# Patient Record
Sex: MALE | Race: BLACK OR AFRICAN AMERICAN | NOT HISPANIC OR LATINO | URBAN - METROPOLITAN AREA
[De-identification: names, ages, dates, MRNs, and addresses within clinical notes are randomized per-mention and may not be internally consistent; named-entity substitution may affect disease eponyms.]

---

## 2017-03-31 ENCOUNTER — EMERGENCY (EMERGENCY)
Facility: HOSPITAL | Age: 22
LOS: 1 days | Discharge: HOME | End: 2017-03-31
Attending: EMERGENCY MEDICINE
Payer: SELF-PAY

## 2017-03-31 VITALS
DIASTOLIC BLOOD PRESSURE: 74 MMHG | OXYGEN SATURATION: 98 % | TEMPERATURE: 98 F | SYSTOLIC BLOOD PRESSURE: 126 MMHG | HEART RATE: 87 BPM | RESPIRATION RATE: 17 BRPM

## 2017-03-31 DIAGNOSIS — R68.83 CHILLS (WITHOUT FEVER): ICD-10-CM

## 2017-03-31 DIAGNOSIS — M25.50 PAIN IN UNSPECIFIED JOINT: ICD-10-CM

## 2017-03-31 PROCEDURE — 99284 EMERGENCY DEPT VISIT MOD MDM: CPT | Mod: 25

## 2017-03-31 PROCEDURE — 99053 MED SERV 10PM-8AM 24 HR FAC: CPT

## 2017-03-31 NOTE — ED ADULT TRIAGE NOTE - CHIEF COMPLAINT QUOTE
pt presents to ED c/o joint pains and feeling cold.  pt is undomiciled.  denies PMH, any trauma, drug/alcohol use.

## 2017-04-01 VITALS
DIASTOLIC BLOOD PRESSURE: 72 MMHG | HEART RATE: 65 BPM | RESPIRATION RATE: 18 BRPM | SYSTOLIC BLOOD PRESSURE: 127 MMHG | TEMPERATURE: 96 F | OXYGEN SATURATION: 98 %

## 2017-04-01 LAB
ALBUMIN SERPL ELPH-MCNC: 3.7 G/DL — SIGNIFICANT CHANGE UP (ref 3.3–5.2)
ALP SERPL-CCNC: 64 U/L — SIGNIFICANT CHANGE UP (ref 40–120)
ALT FLD-CCNC: 18 U/L — SIGNIFICANT CHANGE UP
ANION GAP SERPL CALC-SCNC: 11 MMOL/L — SIGNIFICANT CHANGE UP (ref 5–17)
APAP SERPL-MCNC: <7.5 UG/ML — LOW (ref 10–26)
APPEARANCE UR: CLEAR — SIGNIFICANT CHANGE UP
AST SERPL-CCNC: 17 U/L — SIGNIFICANT CHANGE UP
BASOPHILS # BLD AUTO: 0 K/UL — SIGNIFICANT CHANGE UP (ref 0–0.2)
BASOPHILS NFR BLD AUTO: 0.2 % — SIGNIFICANT CHANGE UP (ref 0–2)
BILIRUB SERPL-MCNC: 0.3 MG/DL — LOW (ref 0.4–2)
BILIRUB UR-MCNC: NEGATIVE — SIGNIFICANT CHANGE UP
BUN SERPL-MCNC: 13 MG/DL — SIGNIFICANT CHANGE UP (ref 8–20)
CALCIUM SERPL-MCNC: 8.6 MG/DL — SIGNIFICANT CHANGE UP (ref 8.6–10.2)
CHLORIDE SERPL-SCNC: 106 MMOL/L — SIGNIFICANT CHANGE UP (ref 98–107)
CO2 SERPL-SCNC: 28 MMOL/L — SIGNIFICANT CHANGE UP (ref 22–29)
COLOR SPEC: YELLOW — SIGNIFICANT CHANGE UP
CREAT SERPL-MCNC: 0.64 MG/DL — SIGNIFICANT CHANGE UP (ref 0.5–1.3)
DIFF PNL FLD: NEGATIVE — SIGNIFICANT CHANGE UP
EOSINOPHIL # BLD AUTO: 0.3 K/UL — SIGNIFICANT CHANGE UP (ref 0–0.5)
EOSINOPHIL NFR BLD AUTO: 5.1 % — HIGH (ref 0–5)
ETHANOL SERPL-MCNC: <10 MG/DL — SIGNIFICANT CHANGE UP
GLUCOSE SERPL-MCNC: 113 MG/DL — SIGNIFICANT CHANGE UP (ref 70–115)
GLUCOSE UR QL: NEGATIVE MG/DL — SIGNIFICANT CHANGE UP
HCT VFR BLD CALC: 41.9 % — LOW (ref 42–52)
HGB BLD-MCNC: 14 G/DL — SIGNIFICANT CHANGE UP (ref 14–18)
KETONES UR-MCNC: NEGATIVE — SIGNIFICANT CHANGE UP
LEUKOCYTE ESTERASE UR-ACNC: NEGATIVE — SIGNIFICANT CHANGE UP
LYMPHOCYTES # BLD AUTO: 3.1 K/UL — SIGNIFICANT CHANGE UP (ref 1–4.8)
LYMPHOCYTES # BLD AUTO: 49.9 % — SIGNIFICANT CHANGE UP (ref 20–55)
MAGNESIUM SERPL-MCNC: 1.9 MG/DL — SIGNIFICANT CHANGE UP (ref 1.8–2.5)
MCHC RBC-ENTMCNC: 27.9 PG — SIGNIFICANT CHANGE UP (ref 27–31)
MCHC RBC-ENTMCNC: 33.4 G/DL — SIGNIFICANT CHANGE UP (ref 32–36)
MCV RBC AUTO: 83.6 FL — SIGNIFICANT CHANGE UP (ref 80–94)
MONOCYTES # BLD AUTO: 0.4 K/UL — SIGNIFICANT CHANGE UP (ref 0–0.8)
MONOCYTES NFR BLD AUTO: 6.9 % — SIGNIFICANT CHANGE UP (ref 3–10)
NEUTROPHILS # BLD AUTO: 2.3 K/UL — SIGNIFICANT CHANGE UP (ref 1.8–8)
NEUTROPHILS NFR BLD AUTO: 37.7 % — SIGNIFICANT CHANGE UP (ref 37–73)
NITRITE UR-MCNC: NEGATIVE — SIGNIFICANT CHANGE UP
PH UR: 6 — SIGNIFICANT CHANGE UP (ref 4.8–8)
PLATELET # BLD AUTO: 237 K/UL — SIGNIFICANT CHANGE UP (ref 150–400)
POTASSIUM SERPL-MCNC: 3.6 MMOL/L — SIGNIFICANT CHANGE UP (ref 3.5–5.3)
POTASSIUM SERPL-SCNC: 3.6 MMOL/L — SIGNIFICANT CHANGE UP (ref 3.5–5.3)
PROT SERPL-MCNC: 5.8 G/DL — LOW (ref 6.6–8.7)
PROT UR-MCNC: NEGATIVE MG/DL — SIGNIFICANT CHANGE UP
RBC # BLD: 5.01 M/UL — SIGNIFICANT CHANGE UP (ref 4.6–6.2)
RBC # FLD: 12.9 % — SIGNIFICANT CHANGE UP (ref 11–15.6)
SALICYLATES SERPL-MCNC: <2 MG/DL — LOW (ref 10–20)
SODIUM SERPL-SCNC: 145 MMOL/L — SIGNIFICANT CHANGE UP (ref 135–145)
SP GR SPEC: 1.02 — SIGNIFICANT CHANGE UP (ref 1.01–1.02)
UROBILINOGEN FLD QL: NEGATIVE MG/DL — SIGNIFICANT CHANGE UP
WBC # BLD: 6.1 K/UL — SIGNIFICANT CHANGE UP (ref 4.8–10.8)
WBC # FLD AUTO: 6.1 K/UL — SIGNIFICANT CHANGE UP (ref 4.8–10.8)

## 2017-04-01 PROCEDURE — 87086 URINE CULTURE/COLONY COUNT: CPT

## 2017-04-01 PROCEDURE — 81003 URINALYSIS AUTO W/O SCOPE: CPT

## 2017-04-01 PROCEDURE — 80307 DRUG TEST PRSMV CHEM ANLYZR: CPT

## 2017-04-01 PROCEDURE — 80053 COMPREHEN METABOLIC PANEL: CPT

## 2017-04-01 PROCEDURE — 85027 COMPLETE CBC AUTOMATED: CPT

## 2017-04-01 PROCEDURE — 83735 ASSAY OF MAGNESIUM: CPT

## 2017-04-01 PROCEDURE — 99283 EMERGENCY DEPT VISIT LOW MDM: CPT | Mod: 25

## 2017-04-01 NOTE — ED PROVIDER NOTE - NS ED MD SCRIBE ATTENDING SCRIBE SECTIONS
VITAL SIGNS( Pullset)/PHYSICAL EXAM/REVIEW OF SYSTEMS/DISPOSITION/HISTORY OF PRESENT ILLNESS/PAST MEDICAL/SURGICAL/SOCIAL HISTORY/HIV

## 2017-04-01 NOTE — ED ADULT NURSE NOTE - OBJECTIVE STATEMENT
Assumed pt care @ 100. At first communication with pt, he continue to stare and me without responding. I then proceeded to ask pt his name and  and he was able to tell me and is A&OX3 Pt states he is here because of a HA and nausea. Denies any drugs/alcohol/trauma. Denies SI/HI.

## 2017-04-01 NOTE — ED PROVIDER NOTE - OBJECTIVE STATEMENT
22 y/o M pt presents to the ED c/o 20 y/o M pt presents to the ED c/o joint pain and chills. Pt is homeless. Unable to obtain complete hx due to mental status of pt. Pt denies CP, SOB, and any other physical complaints. No further complaints at this time. NKDA.

## 2017-04-01 NOTE — ED ADULT NURSE REASSESSMENT NOTE - NS ED NURSE REASSESS COMMENT FT1
Pt resting comfortably, VSS, resp even and unlabored no signs of nonverbal indicators of pain. Will continue to monitor.

## 2017-04-02 LAB
CULTURE RESULTS: SIGNIFICANT CHANGE UP
SPECIMEN SOURCE: SIGNIFICANT CHANGE UP

## 2018-01-16 ENCOUNTER — EMERGENCY (EMERGENCY)
Facility: HOSPITAL | Age: 23
LOS: 1 days | Discharge: OTHER INSTITUTION | End: 2018-01-16
Attending: EMERGENCY MEDICINE | Admitting: EMERGENCY MEDICINE
Payer: SELF-PAY

## 2018-01-16 VITALS
DIASTOLIC BLOOD PRESSURE: 75 MMHG | SYSTOLIC BLOOD PRESSURE: 120 MMHG | WEIGHT: 175.05 LBS | TEMPERATURE: 98 F | RESPIRATION RATE: 18 BRPM | OXYGEN SATURATION: 97 % | HEART RATE: 70 BPM

## 2018-01-16 LAB
AMPHET UR-MCNC: NEGATIVE — SIGNIFICANT CHANGE UP
BARBITURATES UR SCN-MCNC: NEGATIVE — SIGNIFICANT CHANGE UP
BENZODIAZ UR-MCNC: NEGATIVE — SIGNIFICANT CHANGE UP
COCAINE METAB.OTHER UR-MCNC: NEGATIVE — SIGNIFICANT CHANGE UP
METHADONE UR-MCNC: NEGATIVE — SIGNIFICANT CHANGE UP
OPIATES UR-MCNC: NEGATIVE — SIGNIFICANT CHANGE UP
PCP SPEC-MCNC: SIGNIFICANT CHANGE UP
PCP UR-MCNC: NEGATIVE — SIGNIFICANT CHANGE UP
THC UR QL: POSITIVE

## 2018-01-16 PROCEDURE — 99285 EMERGENCY DEPT VISIT HI MDM: CPT

## 2018-01-16 PROCEDURE — 93010 ELECTROCARDIOGRAM REPORT: CPT

## 2018-01-16 PROCEDURE — 90792 PSYCH DIAG EVAL W/MED SRVCS: CPT | Mod: GT

## 2018-01-16 NOTE — ED ADULT NURSE NOTE - CAS DISCH TRANSFER METHOD
Ambulance with BLS capability [facility] transport/Ambulance with BLS capability

## 2018-01-16 NOTE — ED PROVIDER NOTE - PROGRESS NOTE DETAILS
As per sign-out from Dr. Roblero patient awaiting psychiatric consultation for episode of hallucinations ( reported) Spoke with Dr. Haji whom recommends admission, voluntarily.

## 2018-01-16 NOTE — ED ADULT NURSE NOTE - OBJECTIVE STATEMENT
Assumed pt care at 2100.  Pt awake, alert and oriented x3 brought in for psych eval by aunt for possible hallucinations and pacing around home.   Pt responding appropriately at this time.

## 2018-01-16 NOTE — ED PROVIDER NOTE - CARE PLAN
Principal Discharge DX:	PTSD (post-traumatic stress disorder) Principal Discharge DX:	PTSD (post-traumatic stress disorder)  Secondary Diagnosis:	Psychosis

## 2018-01-16 NOTE — ED PROVIDER NOTE - OBJECTIVE STATEMENT
21 y/o male with h/o PTSD and previous admission to [facility] BIB aunt who is currently his guardian for increased "pacing around the house and possible hallucinations".  Pt's aunt states he seems to be talking and responding to people that are not there.  Pt denies any SI or HI.  Pt stopped taking Seroquel several months ago and isn't currently following up with psychiatry.  Pt denies any drug or alcohol use but smokes about 5 cigarettes daily.

## 2018-01-17 ENCOUNTER — INPATIENT (INPATIENT)
Facility: HOSPITAL | Age: 23
LOS: 39 days | Discharge: HOME | End: 2018-02-26
Attending: PSYCHIATRY & NEUROLOGY | Admitting: PSYCHIATRY & NEUROLOGY
Payer: MEDICAID

## 2018-01-17 VITALS
OXYGEN SATURATION: 100 % | RESPIRATION RATE: 20 BRPM | HEART RATE: 69 BPM | TEMPERATURE: 98 F | DIASTOLIC BLOOD PRESSURE: 67 MMHG | SYSTOLIC BLOOD PRESSURE: 132 MMHG

## 2018-01-17 DIAGNOSIS — Z91.19 PATIENT'S NONCOMPLIANCE WITH OTHER MEDICAL TREATMENT AND REGIMEN: ICD-10-CM

## 2018-01-17 DIAGNOSIS — F20.5 RESIDUAL SCHIZOPHRENIA: ICD-10-CM

## 2018-01-17 DIAGNOSIS — F43.10 POST-TRAUMATIC STRESS DISORDER, UNSPECIFIED: ICD-10-CM

## 2018-01-17 DIAGNOSIS — F10.10 ALCOHOL ABUSE, UNCOMPLICATED: ICD-10-CM

## 2018-01-17 DIAGNOSIS — F12.20 CANNABIS DEPENDENCE, UNCOMPLICATED: ICD-10-CM

## 2018-01-17 DIAGNOSIS — F10.20 ALCOHOL DEPENDENCE, UNCOMPLICATED: ICD-10-CM

## 2018-01-17 DIAGNOSIS — Z63.9 PROBLEM RELATED TO PRIMARY SUPPORT GROUP, UNSPECIFIED: ICD-10-CM

## 2018-01-17 DIAGNOSIS — F23 BRIEF PSYCHOTIC DISORDER: ICD-10-CM

## 2018-01-17 DIAGNOSIS — R69 ILLNESS, UNSPECIFIED: ICD-10-CM

## 2018-01-17 DIAGNOSIS — M25.572 PAIN IN LEFT ANKLE AND JOINTS OF LEFT FOOT: ICD-10-CM

## 2018-01-17 DIAGNOSIS — Z91.14 PATIENT'S OTHER NONCOMPLIANCE WITH MEDICATION REGIMEN: ICD-10-CM

## 2018-01-17 DIAGNOSIS — G47.00 INSOMNIA, UNSPECIFIED: ICD-10-CM

## 2018-01-17 DIAGNOSIS — Z28.21 IMMUNIZATION NOT CARRIED OUT BECAUSE OF PATIENT REFUSAL: ICD-10-CM

## 2018-01-17 DIAGNOSIS — F20.3 UNDIFFERENTIATED SCHIZOPHRENIA: ICD-10-CM

## 2018-01-17 DIAGNOSIS — R14.3 FLATULENCE: ICD-10-CM

## 2018-01-17 DIAGNOSIS — F17.200 NICOTINE DEPENDENCE, UNSPECIFIED, UNCOMPLICATED: ICD-10-CM

## 2018-01-17 LAB
ANION GAP SERPL CALC-SCNC: 13 MMOL/L — SIGNIFICANT CHANGE UP (ref 5–17)
APAP SERPL-MCNC: <7.5 UG/ML — LOW (ref 10–26)
APPEARANCE UR: CLEAR — SIGNIFICANT CHANGE UP
BASOPHILS # BLD AUTO: 0 K/UL — SIGNIFICANT CHANGE UP (ref 0–0.2)
BASOPHILS NFR BLD AUTO: 0.2 % — SIGNIFICANT CHANGE UP (ref 0–2)
BILIRUB UR-MCNC: NEGATIVE — SIGNIFICANT CHANGE UP
BUN SERPL-MCNC: 16 MG/DL — SIGNIFICANT CHANGE UP (ref 8–20)
CALCIUM SERPL-MCNC: 9.3 MG/DL — SIGNIFICANT CHANGE UP (ref 8.6–10.2)
CHLORIDE SERPL-SCNC: 100 MMOL/L — SIGNIFICANT CHANGE UP (ref 98–107)
CO2 SERPL-SCNC: 26 MMOL/L — SIGNIFICANT CHANGE UP (ref 22–29)
COLOR SPEC: YELLOW — SIGNIFICANT CHANGE UP
CREAT SERPL-MCNC: 0.76 MG/DL — SIGNIFICANT CHANGE UP (ref 0.5–1.3)
DIFF PNL FLD: NEGATIVE — SIGNIFICANT CHANGE UP
EOSINOPHIL # BLD AUTO: 0.3 K/UL — SIGNIFICANT CHANGE UP (ref 0–0.5)
EOSINOPHIL NFR BLD AUTO: 4.7 % — SIGNIFICANT CHANGE UP (ref 0–5)
ETHANOL SERPL-MCNC: <10 MG/DL — SIGNIFICANT CHANGE UP
GLUCOSE SERPL-MCNC: 94 MG/DL — SIGNIFICANT CHANGE UP (ref 70–115)
GLUCOSE UR QL: NEGATIVE MG/DL — SIGNIFICANT CHANGE UP
HCT VFR BLD CALC: 44.8 % — SIGNIFICANT CHANGE UP (ref 42–52)
HGB BLD-MCNC: 15 G/DL — SIGNIFICANT CHANGE UP (ref 14–18)
KETONES UR-MCNC: NEGATIVE — SIGNIFICANT CHANGE UP
LEUKOCYTE ESTERASE UR-ACNC: NEGATIVE — SIGNIFICANT CHANGE UP
LYMPHOCYTES # BLD AUTO: 3.6 K/UL — SIGNIFICANT CHANGE UP (ref 1–4.8)
LYMPHOCYTES # BLD AUTO: 55 % — SIGNIFICANT CHANGE UP (ref 20–55)
MCHC RBC-ENTMCNC: 28.8 PG — SIGNIFICANT CHANGE UP (ref 27–31)
MCHC RBC-ENTMCNC: 33.5 G/DL — SIGNIFICANT CHANGE UP (ref 32–36)
MCV RBC AUTO: 86 FL — SIGNIFICANT CHANGE UP (ref 80–94)
MONOCYTES # BLD AUTO: 0.3 K/UL — SIGNIFICANT CHANGE UP (ref 0–0.8)
MONOCYTES NFR BLD AUTO: 5 % — SIGNIFICANT CHANGE UP (ref 3–10)
NEUTROPHILS # BLD AUTO: 2.3 K/UL — SIGNIFICANT CHANGE UP (ref 1.8–8)
NEUTROPHILS NFR BLD AUTO: 34.9 % — LOW (ref 37–73)
NITRITE UR-MCNC: NEGATIVE — SIGNIFICANT CHANGE UP
PH UR: 6 — SIGNIFICANT CHANGE UP (ref 5–8)
PLATELET # BLD AUTO: 213 K/UL — SIGNIFICANT CHANGE UP (ref 150–400)
POTASSIUM SERPL-MCNC: 4 MMOL/L — SIGNIFICANT CHANGE UP (ref 3.5–5.3)
POTASSIUM SERPL-SCNC: 4 MMOL/L — SIGNIFICANT CHANGE UP (ref 3.5–5.3)
PROT UR-MCNC: NEGATIVE MG/DL — SIGNIFICANT CHANGE UP
RBC # BLD: 5.21 M/UL — SIGNIFICANT CHANGE UP (ref 4.6–6.2)
RBC # FLD: 13.3 % — SIGNIFICANT CHANGE UP (ref 11–15.6)
SALICYLATES SERPL-MCNC: <0.6 MG/DL — LOW (ref 10–20)
SODIUM SERPL-SCNC: 139 MMOL/L — SIGNIFICANT CHANGE UP (ref 135–145)
SP GR SPEC: 1.01 — SIGNIFICANT CHANGE UP (ref 1.01–1.02)
TSH SERPL-MCNC: 2.52 UIU/ML — SIGNIFICANT CHANGE UP (ref 0.27–4.2)
UROBILINOGEN FLD QL: NEGATIVE MG/DL — SIGNIFICANT CHANGE UP
WBC # BLD: 6.6 K/UL — SIGNIFICANT CHANGE UP (ref 4.8–10.8)
WBC # FLD AUTO: 6.6 K/UL — SIGNIFICANT CHANGE UP (ref 4.8–10.8)

## 2018-01-17 PROCEDURE — 85027 COMPLETE CBC AUTOMATED: CPT

## 2018-01-17 PROCEDURE — 81003 URINALYSIS AUTO W/O SCOPE: CPT

## 2018-01-17 PROCEDURE — 80048 BASIC METABOLIC PNL TOTAL CA: CPT

## 2018-01-17 PROCEDURE — 93005 ELECTROCARDIOGRAM TRACING: CPT

## 2018-01-17 PROCEDURE — 84443 ASSAY THYROID STIM HORMONE: CPT

## 2018-01-17 PROCEDURE — 80307 DRUG TEST PRSMV CHEM ANLYZR: CPT

## 2018-01-17 PROCEDURE — 36415 COLL VENOUS BLD VENIPUNCTURE: CPT

## 2018-01-17 PROCEDURE — 99285 EMERGENCY DEPT VISIT HI MDM: CPT | Mod: 25

## 2018-01-17 RX ORDER — INFLUENZA VIRUS VACCINE 15; 15; 15; 15 UG/.5ML; UG/.5ML; UG/.5ML; UG/.5ML
0.5 SUSPENSION INTRAMUSCULAR ONCE
Qty: 0 | Refills: 0 | Status: COMPLETED | OUTPATIENT
Start: 2018-01-17 | End: 2018-01-17

## 2018-01-17 RX ORDER — SIMETHICONE 80 MG/1
80 TABLET, CHEWABLE ORAL
Qty: 0 | Refills: 0 | Status: DISCONTINUED | OUTPATIENT
Start: 2018-01-17 | End: 2018-02-26

## 2018-01-17 RX ORDER — HALOPERIDOL DECANOATE 100 MG/ML
5 INJECTION INTRAMUSCULAR EVERY 6 HOURS
Qty: 0 | Refills: 0 | Status: DISCONTINUED | OUTPATIENT
Start: 2018-01-17 | End: 2018-02-26

## 2018-01-17 RX ORDER — DOCUSATE SODIUM 100 MG
100 CAPSULE ORAL DAILY
Qty: 0 | Refills: 0 | Status: DISCONTINUED | OUTPATIENT
Start: 2018-01-17 | End: 2018-01-29

## 2018-01-17 RX ORDER — ACETAMINOPHEN 500 MG
650 TABLET ORAL EVERY 6 HOURS
Qty: 0 | Refills: 0 | Status: DISCONTINUED | OUTPATIENT
Start: 2018-01-17 | End: 2018-02-26

## 2018-01-17 RX ORDER — RISPERIDONE 4 MG/1
0.5 TABLET ORAL AT BEDTIME
Qty: 0 | Refills: 0 | Status: DISCONTINUED | OUTPATIENT
Start: 2018-01-17 | End: 2018-01-19

## 2018-01-17 RX ORDER — DIPHENHYDRAMINE HCL 50 MG
50 CAPSULE ORAL EVERY 4 HOURS
Qty: 0 | Refills: 0 | Status: DISCONTINUED | OUTPATIENT
Start: 2018-01-17 | End: 2018-02-01

## 2018-01-17 RX ORDER — IBUPROFEN 200 MG
600 TABLET ORAL EVERY 6 HOURS
Qty: 0 | Refills: 0 | Status: DISCONTINUED | OUTPATIENT
Start: 2018-01-17 | End: 2018-02-26

## 2018-01-17 RX ORDER — MAGNESIUM HYDROXIDE 400 MG/1
30 TABLET, CHEWABLE ORAL DAILY
Qty: 0 | Refills: 0 | Status: DISCONTINUED | OUTPATIENT
Start: 2018-01-17 | End: 2018-01-29

## 2018-01-17 RX ADMIN — RISPERIDONE 0.5 MILLIGRAM(S): 4 TABLET ORAL at 21:18

## 2018-01-17 SDOH — SOCIAL STABILITY - SOCIAL INSECURITY: PROBLEM RELATED TO PRIMARY SUPPORT GROUP, UNSPECIFIED: Z63.9

## 2018-01-17 NOTE — BEHAVIORAL HEALTH ASSESSMENT NOTE - NSBHADMITMEDEDUDETAILS_A_CORE FT
Informed consent process begun related to restart of antipsychotic medication Risperdal 9 effective and well tolerated in the past

## 2018-01-17 NOTE — ED BEHAVIORAL HEALTH ASSESSMENT NOTE - OTHER
[facility] - danger to self n/a

## 2018-01-17 NOTE — ED BEHAVIORAL HEALTH ASSESSMENT NOTE - DESCRIPTION
unremarkable none Patient graduated HS, has worked a few jobs since then. He lives with Aunt because he is very behavioral with mom.  Patient. witnessed Grandmother's death  at age 15.

## 2018-01-17 NOTE — ED ADULT NURSE REASSESSMENT NOTE - NS ED NURSE REASSESS COMMENT FT1
report given to Sae RICHTER in 
Received pt AOx3, calm and in yellow gown. Pt cooperative during security contraband check. Pt reports that he has been pacing back and forth lately. Pt reports increased energy and racing thoughts. Pt denies any SI/HI or any hallucinations. Tele-psych consult complete. Pt aware of plan of care to sign voluntary. Will monitor the pt for safety.

## 2018-01-17 NOTE — BEHAVIORAL HEALTH ASSESSMENT NOTE - NSBHCHARTREVIEWLAB_PSY_A_CORE FT
CBC Full  -  ( 2018 02:12 )  WBC Count : 6.6 K/uL  Hemoglobin : 15.0 g/dL  Hematocrit : 44.8 %  Platelet Count - Automated : 213 K/uL  Mean Cell Volume : 86.0 fl  Mean Cell Hemoglobin : 28.8 pg  Mean Cell Hemoglobin Concentration : 33.5 g/dL  Auto Neutrophil # : 2.3 K/uL  Auto Lymphocyte # : 3.6 K/uL  Auto Monocyte # : 0.3 K/uL  Auto Eosinophil # : 0.3 K/uL  Auto Basophil # : 0.0 K/uL  Auto Neutrophil % : 34.9 %  Auto Lymphocyte % : 55.0 %  Auto Monocyte % : 5.0 %  Auto Eosinophil % : 4.7 %  Auto Basophil % : 0.2 %    -17    139  |  100  |  16.0  ----------------------------<  94  4.0   |  26.0  |  0.76    Ca    9.3      2018 02:12        Urinalysis Basic - ( 2018 04:26 )    Color: Yellow / Appearance: Clear / S.015 / pH: x  Gluc: x / Ketone: Negative  / Bili: Negative / Urobili: Negative mg/dL   Blood: x / Protein: Negative mg/dL / Nitrite: Negative   Leuk Esterase: Negative / RBC: x / WBC x   Sq Epi: x / Non Sq Epi: x / Bacteria: x

## 2018-01-17 NOTE — ED BEHAVIORAL HEALTH ASSESSMENT NOTE - SUMMARY
Patient is a 21 y/o AA male with h/o Schizophrenia, marijuana abuse, post-traumatic stress disorder, with two past psych hospitalizations, bib Aunt (legal guardian) with Aunt complaining of pt. hearing voices and being internally preoccupied.  Patient. with 1 arrest, no violence, no suicide attempts, no self injury.    Patient reports that he is in ER because he has been pacing lately, not sleeping well.  He has extra energy and he has been taking very long distance walks.  He endorses not being on psych meds for a few years.  He use to be on antipsychotics a few years ago.  He remembers being on Seroquel and Risperdal.  But he remembers Seroquel gave him nightmares and made him very tired.  He most recently got fired from his job that he has had since this past summer.  He did not know why he was fired.  Patient. was vague in some of his answers and he laughed and seemed to be responding to internal stimuli.  But pt. denied that he was hearing voices.  He admitted to smoking weed atleast 2-3 times per week.   Patient reports he will sign in voluntarily to get back on meds.  Patient does not have any friends or romantic relationships.

## 2018-01-17 NOTE — ED BEHAVIORAL HEALTH ASSESSMENT NOTE - RISK ASSESSMENT
Patient is internally preoccupied, pacing, vague thought process with thought blocking.  Patient will sign in voluntarily and pt. requires inpt. admission to get back on meds and be stabilized.

## 2018-01-17 NOTE — BEHAVIORAL HEALTH ASSESSMENT NOTE - NSBHCHARTREVIEWVS_PSY_A_CORE FT
Vital Signs Last 24 Hrs  T(C): 36.5 (17 Jan 2018 07:48), Max: 36.7 (17 Jan 2018 03:52)  T(F): 97.7 (17 Jan 2018 07:48), Max: 98 (17 Jan 2018 03:52)  HR: 69 (17 Jan 2018 07:48) (56 - 70)  BP: 132/67 (17 Jan 2018 07:48) (120/69 - 132/67)  BP(mean): --  RR: 20 (17 Jan 2018 07:48) (18 - 20)  SpO2: 100% (17 Jan 2018 07:48) (97% - 100%)

## 2018-01-17 NOTE — BEHAVIORAL HEALTH ASSESSMENT NOTE - PROBLEM SELECTOR PLAN 3
1. Pt encouraged to maintain sobriety  2.Pt urged to attend therapy groups including those with focus on substance use d/o issues   3,Dual diagnosis treatment recommended as part of aftercare planning

## 2018-01-17 NOTE — ED ADULT NURSE REASSESSMENT NOTE - COMFORT CARE
plan of care explained/warm blanket provided/repositioned/po fluids offered/wait time explained/ambulated to bathroom

## 2018-01-17 NOTE — BEHAVIORAL HEALTH ASSESSMENT NOTE - PROBLEM SELECTOR PLAN 1
1. Pt gave informed consent to restart low dose Risperdal M tab 0.5 mg po qhs ( psychosis)  2.Pt encouraged to attend therapy groups as tolerated  3.Pt gave verbal permission for writer to contact the pt's family to gather further clinical history to aid in pt treatment and disposition planning

## 2018-01-17 NOTE — BEHAVIORAL HEALTH ASSESSMENT NOTE - NSBHCHARTREVIEWIMAGING_PSY_A_CORE FT
MEDICATIONS  (STANDING):  risperiDONE  Disintegrating Tablet 0.5 milliGRAM(s) Oral at bedtime    MEDICATIONS  (PRN):  acetaminophen   Tablet 650 milliGRAM(s) Oral every 6 hours PRN For Temp greater than 38 C (100.4 F)  aluminum hydroxide/magnesium hydroxide/simethicone Suspension 30 milliLiter(s) Oral every 6 hours PRN Dyspepsia  diphenhydrAMINE   Capsule 50 milliGRAM(s) Oral every 4 hours PRN Rash and/or Itching  docusate sodium 100 milliGRAM(s) Oral daily PRN Constipation  haloperidol     Tablet 5 milliGRAM(s) Oral every 6 hours PRN agitation  haloperidol    Injectable 5 milliGRAM(s) IntraMuscular every 6 hours PRN Agitation  ibuprofen  Tablet 600 milliGRAM(s) Oral every 6 hours PRN pain  LORazepam     Tablet 2 milliGRAM(s) Oral every 6 hours PRN Anxiety  LORazepam   Injectable 2 milliGRAM(s) IntraMuscular every 6 hours PRN Anxiety  magnesium hydroxide Suspension 30 milliLiter(s) Oral daily PRN Constipation  simethicone 80 milliGRAM(s) Chew four times a day PRN Gas

## 2018-01-17 NOTE — BEHAVIORAL HEALTH ASSESSMENT NOTE - PROBLEM SELECTOR PLAN 2
1. Pt encouraged to abstain from this and all substances of abuse/misuse  2.Pt encouraged to attend therapy groups including those with a focus on substance use d/o issues

## 2018-01-17 NOTE — ED BEHAVIORAL HEALTH ASSESSMENT NOTE - HPI (INCLUDE ILLNESS QUALITY, SEVERITY, DURATION, TIMING, CONTEXT, MODIFYING FACTORS, ASSOCIATED SIGNS AND SYMPTOMS)
Patient is a 23 y/o AA male with h/o Schizophrenia, marijuana abuse, post-traumatic stress disorder, with two past psych hospitalizations, bib Aunt (legal guardian) with Aunt complaining of pt. hearing voices and being internally preoccupied.  Patient. with 1 arrest, no violence, no suicide attempts, no self injury.    Patient reports that he is in ER because he has been pacing lately, not sleeping well.  He has extra energy and he has been taking very long distance walks.  He endorses not being on psych meds for a few years.  He use to be on antipsychotics a few years ago.  He remembers being on Seroquel and Risperdal.  But he remembers Seroquel gave him nightmares and made him very tired.  He most recently got fired from his job that he has had since this past summer.  He did not know why he was fired.  Patient. was vague in some of his answers and he laughed and seemed to be responding to internal stimuli.  But pt. denied that he was hearing voices.  He admitted to smoking weed atleast 2-3 times per week.   Patient reports he will sign in voluntarily to get back on meds.  Patient does not have any friends or romantic relationships.

## 2018-01-18 VITALS
DIASTOLIC BLOOD PRESSURE: 56 MMHG | SYSTOLIC BLOOD PRESSURE: 113 MMHG | RESPIRATION RATE: 14 BRPM | OXYGEN SATURATION: 100 % | TEMPERATURE: 98 F | HEART RATE: 64 BPM

## 2018-01-18 RX ADMIN — Medication 30 MILLILITER(S): at 17:10

## 2018-01-18 RX ADMIN — RISPERIDONE 0.5 MILLIGRAM(S): 4 TABLET ORAL at 21:11

## 2018-01-18 NOTE — PROGRESS NOTE BEHAVIORAL HEALTH - OTHER
minimally cooperative in light of ongoing marked paranoia guarded with furtive glances at writer cautious apprehensive appearing despite pt report he is ok guarded, suspicious paranoid, persecutory, somatic delusions, pt appears to be internally preoccupied and hearing voices despite pt report to the contrary

## 2018-01-19 RX ORDER — RISPERIDONE 4 MG/1
0.5 TABLET ORAL DAILY
Qty: 0 | Refills: 0 | Status: DISCONTINUED | OUTPATIENT
Start: 2018-01-20 | End: 2018-02-05

## 2018-01-19 RX ORDER — RISPERIDONE 4 MG/1
1 TABLET ORAL AT BEDTIME
Qty: 0 | Refills: 0 | Status: DISCONTINUED | OUTPATIENT
Start: 2018-01-19 | End: 2018-01-22

## 2018-01-19 RX ADMIN — RISPERIDONE 1 MILLIGRAM(S): 4 TABLET ORAL at 21:05

## 2018-01-19 NOTE — PROGRESS NOTE BEHAVIORAL HEALTH - OTHER
cautious apprehensive appearing despite pt report he is ok, giddy and euphoric at times guarded, suspicious, suddenly elated without provocation paranoid, persecutory, somatic delusions, Adventist and sexual preoccupations pt appears to be internally preoccupied and hearing voices despite pt report to the contrary minimally cooperative in light of ongoing marked paranoia guarded with furtive glances at writer

## 2018-01-20 RX ADMIN — RISPERIDONE 1 MILLIGRAM(S): 4 TABLET ORAL at 21:02

## 2018-01-20 RX ADMIN — RISPERIDONE 0.5 MILLIGRAM(S): 4 TABLET ORAL at 12:22

## 2018-01-20 NOTE — PROGRESS NOTE BEHAVIORAL HEALTH - OTHER
minimally cooperative in light of ongoing marked paranoia guarded with furtive glances at writer cautious apprehensive appearing despite pt report he is ok, giddy and euphoric at times guarded, suspicious, suddenly elated without provocation paranoid, persecutory, somatic delusions, Yazidism and sexual preoccupations pt appears to be internally preoccupied and hearing voices despite pt report to the contrary

## 2018-01-21 RX ADMIN — RISPERIDONE 1 MILLIGRAM(S): 4 TABLET ORAL at 21:09

## 2018-01-21 RX ADMIN — RISPERIDONE 0.5 MILLIGRAM(S): 4 TABLET ORAL at 08:58

## 2018-01-21 NOTE — PROGRESS NOTE BEHAVIORAL HEALTH - OTHER
minimally cooperative in light of ongoing marked paranoia guarded with furtive glances at writer cautious apprehensive appearing despite pt report he is ok, giddy and euphoric at times guarded, suspicious, suddenly elated without provocation paranoid, persecutory, somatic delusions, Rastafari and sexual preoccupations pt appears to be internally preoccupied and hearing voices despite pt report to the contrary

## 2018-01-22 RX ORDER — RISPERIDONE 4 MG/1
2 TABLET ORAL AT BEDTIME
Qty: 0 | Refills: 0 | Status: DISCONTINUED | OUTPATIENT
Start: 2018-01-22 | End: 2018-01-24

## 2018-01-22 RX ADMIN — RISPERIDONE 2 MILLIGRAM(S): 4 TABLET ORAL at 21:12

## 2018-01-22 RX ADMIN — RISPERIDONE 0.5 MILLIGRAM(S): 4 TABLET ORAL at 08:35

## 2018-01-22 NOTE — PROGRESS NOTE BEHAVIORAL HEALTH - OTHER
minimally cooperative in light of ongoing marked paranoia guarded with furtive glances at writer cautious apprehensive appearing despite pt report he is ok, giddy and euphoric at times guarded, suspicious, suddenly elated without provocation paranoid, persecutory, somatic delusions, Jewish and sexual preoccupations pt appears to be internally preoccupied and hearing voices despite pt report to the contrary

## 2018-01-23 RX ADMIN — RISPERIDONE 0.5 MILLIGRAM(S): 4 TABLET ORAL at 08:59

## 2018-01-23 RX ADMIN — RISPERIDONE 2 MILLIGRAM(S): 4 TABLET ORAL at 20:43

## 2018-01-23 NOTE — PROGRESS NOTE BEHAVIORAL HEALTH - OTHER
dishevelled , poorly groomed minimally cooperative in light of ongoing marked paranoia guarded with furtive glances at writer cautious, paucity of speech apprehensive appearing despite pt report he is ok, giddy and euphoric at times guarded, suspicious, suddenly elated without provocation paranoid, persecutory, somatic delusions, Christianity and sexual preoccupations pt appears to be internally preoccupied and hearing voices despite pt report to the contrary

## 2018-01-24 RX ORDER — RISPERIDONE 4 MG/1
3 TABLET ORAL AT BEDTIME
Qty: 0 | Refills: 0 | Status: DISCONTINUED | OUTPATIENT
Start: 2018-01-24 | End: 2018-01-30

## 2018-01-24 RX ADMIN — RISPERIDONE 3 MILLIGRAM(S): 4 TABLET ORAL at 21:15

## 2018-01-24 RX ADMIN — Medication 30 MILLILITER(S): at 21:16

## 2018-01-24 RX ADMIN — RISPERIDONE 0.5 MILLIGRAM(S): 4 TABLET ORAL at 09:19

## 2018-01-24 NOTE — PROGRESS NOTE BEHAVIORAL HEALTH - OTHER
dishevelled , poorly groomed minimally cooperative in light of ongoing marked paranoia guarded with furtive glances at writer cautious, paucity of speech cautious, wary serafin ." I'm ok." guarded, suspicious, paranoid, persecutory, somatic delusions, Spiritism and sexual preoccupations pt appears to be internally preoccupied and hearing voices despite pt report to the contrary

## 2018-01-25 RX ORDER — NICOTINE POLACRILEX 2 MG
2 GUM BUCCAL
Qty: 0 | Refills: 0 | Status: DISCONTINUED | OUTPATIENT
Start: 2018-01-25 | End: 2018-02-26

## 2018-01-25 RX ADMIN — RISPERIDONE 3 MILLIGRAM(S): 4 TABLET ORAL at 22:19

## 2018-01-25 RX ADMIN — Medication 2 MILLIGRAM(S): at 19:44

## 2018-01-25 RX ADMIN — Medication 30 MILLILITER(S): at 22:21

## 2018-01-25 RX ADMIN — RISPERIDONE 0.5 MILLIGRAM(S): 4 TABLET ORAL at 11:57

## 2018-01-25 RX ADMIN — MAGNESIUM HYDROXIDE 30 MILLILITER(S): 400 TABLET, CHEWABLE ORAL at 19:43

## 2018-01-25 RX ADMIN — Medication 2 MILLIGRAM(S): at 12:54

## 2018-01-25 NOTE — PROGRESS NOTE BEHAVIORAL HEALTH - OTHER
dishevelled , poorly groomed superficially  cooperative and polite  despite pt's ongoing lack of insight into his illness and the need for treatment guarded with steadier but still wary , suspicious eye contact cautious, paucity of speech cautious, wary serafin ." I'm ok." guarded, suspicious, paranoid, persecutory, somatic delusions, Tenriism and sexual preoccupations pt appears to be internally preoccupied and hearing voices despite pt report to the contrary

## 2018-01-26 RX ADMIN — Medication 2 MILLIGRAM(S): at 11:50

## 2018-01-26 RX ADMIN — MAGNESIUM HYDROXIDE 30 MILLILITER(S): 400 TABLET, CHEWABLE ORAL at 11:50

## 2018-01-26 RX ADMIN — RISPERIDONE 3 MILLIGRAM(S): 4 TABLET ORAL at 21:10

## 2018-01-26 RX ADMIN — Medication 100 MILLIGRAM(S): at 16:17

## 2018-01-26 RX ADMIN — Medication 2 MILLIGRAM(S): at 16:15

## 2018-01-26 RX ADMIN — RISPERIDONE 0.5 MILLIGRAM(S): 4 TABLET ORAL at 10:23

## 2018-01-26 NOTE — PROGRESS NOTE BEHAVIORAL HEALTH - OTHER
dishevelled , poorly groomed superficially  cooperative and polite  despite pt's ongoing lack of insight into his illness and the need for treatment guarded with steadier but still wary , suspicious eye contact cautious, paucity of speech cautious, wary serafin ." I'm ok." guarded, suspicious, paranoid, persecutory, somatic delusions, Buddhism and sexual preoccupations pt appears to be internally preoccupied and hearing voices despite pt report to the contrary

## 2018-01-27 RX ADMIN — RISPERIDONE 0.5 MILLIGRAM(S): 4 TABLET ORAL at 09:11

## 2018-01-27 RX ADMIN — Medication 100 MILLIGRAM(S): at 08:07

## 2018-01-27 RX ADMIN — Medication 2 MILLIGRAM(S): at 08:07

## 2018-01-27 RX ADMIN — MAGNESIUM HYDROXIDE 30 MILLILITER(S): 400 TABLET, CHEWABLE ORAL at 08:07

## 2018-01-27 RX ADMIN — Medication 30 MILLILITER(S): at 16:50

## 2018-01-27 RX ADMIN — RISPERIDONE 3 MILLIGRAM(S): 4 TABLET ORAL at 21:17

## 2018-01-28 RX ADMIN — RISPERIDONE 3 MILLIGRAM(S): 4 TABLET ORAL at 21:07

## 2018-01-28 RX ADMIN — Medication 2 MILLIGRAM(S): at 09:03

## 2018-01-28 RX ADMIN — MAGNESIUM HYDROXIDE 30 MILLILITER(S): 400 TABLET, CHEWABLE ORAL at 17:22

## 2018-01-28 RX ADMIN — Medication 100 MILLIGRAM(S): at 09:02

## 2018-01-28 RX ADMIN — RISPERIDONE 0.5 MILLIGRAM(S): 4 TABLET ORAL at 09:02

## 2018-01-29 RX ORDER — DOCUSATE SODIUM 100 MG
100 CAPSULE ORAL
Qty: 0 | Refills: 0 | Status: DISCONTINUED | OUTPATIENT
Start: 2018-01-29 | End: 2018-02-26

## 2018-01-29 RX ORDER — MAGNESIUM HYDROXIDE 400 MG/1
30 TABLET, CHEWABLE ORAL DAILY
Qty: 0 | Refills: 0 | Status: DISCONTINUED | OUTPATIENT
Start: 2018-01-29 | End: 2018-01-29

## 2018-01-29 RX ORDER — MAGNESIUM HYDROXIDE 400 MG/1
30 TABLET, CHEWABLE ORAL DAILY
Qty: 0 | Refills: 0 | Status: DISCONTINUED | OUTPATIENT
Start: 2018-01-29 | End: 2018-02-26

## 2018-01-29 RX ADMIN — RISPERIDONE 0.5 MILLIGRAM(S): 4 TABLET ORAL at 08:58

## 2018-01-29 RX ADMIN — MAGNESIUM HYDROXIDE 30 MILLILITER(S): 400 TABLET, CHEWABLE ORAL at 21:22

## 2018-01-29 RX ADMIN — RISPERIDONE 3 MILLIGRAM(S): 4 TABLET ORAL at 21:21

## 2018-01-29 RX ADMIN — Medication 100 MILLIGRAM(S): at 21:22

## 2018-01-29 NOTE — PROGRESS NOTE BEHAVIORAL HEALTH - OTHER
dishevelled , poorly groomed slowly becoming more  cooperative , polite  despite pt's ongoing lack of insight into his illness and the need for treatment slowly improving slowly normalizing more spontaneous overall cautious, wary serafin ." I'm ok." less blunted affect becoming more mood congruent thinking less impoverished slowly more organized gradual decrease in frequency and intensity of pt delusions with decrease in pt 's appearing internally preoccupied pt appears to be less significantly  internally preoccupied overall

## 2018-01-30 RX ORDER — RISPERIDONE 4 MG/1
4 TABLET ORAL AT BEDTIME
Qty: 0 | Refills: 0 | Status: DISCONTINUED | OUTPATIENT
Start: 2018-01-30 | End: 2018-02-26

## 2018-01-30 RX ADMIN — Medication 100 MILLIGRAM(S): at 20:48

## 2018-01-30 RX ADMIN — RISPERIDONE 0.5 MILLIGRAM(S): 4 TABLET ORAL at 09:15

## 2018-01-30 RX ADMIN — RISPERIDONE 4 MILLIGRAM(S): 4 TABLET ORAL at 20:47

## 2018-01-30 RX ADMIN — MAGNESIUM HYDROXIDE 30 MILLILITER(S): 400 TABLET, CHEWABLE ORAL at 09:15

## 2018-01-30 RX ADMIN — Medication 100 MILLIGRAM(S): at 09:15

## 2018-01-30 NOTE — PROGRESS NOTE BEHAVIORAL HEALTH - OTHER
pt appears to be  significantly  internally preoccupied despite pt reports to the contrary dishevelled , poorly groomed slowly becoming more  cooperative , polite  despite pt's ongoing lack of insight into his illness and the need for treatment slowly improving slowly normalizing more spontaneous overall though with overall paucity of speech cautious, wary serafin ." I'm ok." less blunted thinking remains  impoverished ongoing disorganization ongoing though slightly less overt delusions of paranoia, grandeur, somatic and persecutory delusions, ongoing sexual preocculation

## 2018-01-31 RX ORDER — SENNA PLUS 8.6 MG/1
2 TABLET ORAL AT BEDTIME
Qty: 0 | Refills: 0 | Status: DISCONTINUED | OUTPATIENT
Start: 2018-01-31 | End: 2018-02-26

## 2018-01-31 RX ADMIN — RISPERIDONE 4 MILLIGRAM(S): 4 TABLET ORAL at 21:01

## 2018-01-31 RX ADMIN — Medication 2 MILLIGRAM(S): at 19:49

## 2018-01-31 RX ADMIN — Medication 2 MILLIGRAM(S): at 12:49

## 2018-01-31 RX ADMIN — RISPERIDONE 0.5 MILLIGRAM(S): 4 TABLET ORAL at 09:06

## 2018-01-31 RX ADMIN — Medication 100 MILLIGRAM(S): at 21:01

## 2018-01-31 RX ADMIN — Medication 100 MILLIGRAM(S): at 09:05

## 2018-01-31 RX ADMIN — SENNA PLUS 2 TABLET(S): 8.6 TABLET ORAL at 21:01

## 2018-01-31 RX ADMIN — Medication 30 MILLILITER(S): at 16:35

## 2018-01-31 RX ADMIN — MAGNESIUM HYDROXIDE 30 MILLILITER(S): 400 TABLET, CHEWABLE ORAL at 09:06

## 2018-01-31 NOTE — PROGRESS NOTE BEHAVIORAL HEALTH - OTHER
dishevelled , with fair  grooming slowly becoming more  cooperative , polite  despite pt's ongoing lack of insight into his illness and the need for treatment slowly improving slowly normalizing more spontaneous overall though with overall paucity of speech cautious, wary serafin ." I'm ok." less blunted thinking remains  impoverished ongoing disorganization ongoing though slightly less overt delusions of paranoia, grandeur, somatic and persecutory delusions, ongoing sexual preocculation pt appears to be  significantly  internally preoccupied despite pt reports to the contrary

## 2018-02-01 RX ORDER — HYDROXYZINE HCL 10 MG
25 TABLET ORAL EVERY 6 HOURS
Qty: 0 | Refills: 0 | Status: DISCONTINUED | OUTPATIENT
Start: 2018-02-01 | End: 2018-02-26

## 2018-02-01 RX ADMIN — Medication 100 MILLIGRAM(S): at 09:10

## 2018-02-01 RX ADMIN — MAGNESIUM HYDROXIDE 30 MILLILITER(S): 400 TABLET, CHEWABLE ORAL at 09:10

## 2018-02-01 RX ADMIN — RISPERIDONE 0.5 MILLIGRAM(S): 4 TABLET ORAL at 09:10

## 2018-02-01 RX ADMIN — SENNA PLUS 2 TABLET(S): 8.6 TABLET ORAL at 21:02

## 2018-02-01 RX ADMIN — Medication 2 MILLIGRAM(S): at 18:14

## 2018-02-01 RX ADMIN — Medication 100 MILLIGRAM(S): at 21:02

## 2018-02-01 RX ADMIN — RISPERIDONE 4 MILLIGRAM(S): 4 TABLET ORAL at 21:02

## 2018-02-01 NOTE — PROGRESS NOTE BEHAVIORAL HEALTH - OTHER
dishevelled , with fair  grooming slowly becoming more  cooperative , polite  despite pt's ongoing lack of insight into his illness and the need for treatment slowly improving slowly normalizing more spontaneous overall though with overall paucity of speech, unelaborative cautious, still with  wary demeanor ." I'm ok." less blunted thinking remains  impoverished ongoing disorganization ongoing though slightly less overt delusions of paranoia, grandeur, somatic and persecutory delusions, ongoing sexual preocculation pt appears to be  significantly  internally preoccupied despite pt reports to the contrary

## 2018-02-02 RX ADMIN — SENNA PLUS 2 TABLET(S): 8.6 TABLET ORAL at 21:10

## 2018-02-02 RX ADMIN — Medication 2 MILLIGRAM(S): at 16:21

## 2018-02-02 RX ADMIN — Medication 100 MILLIGRAM(S): at 09:00

## 2018-02-02 RX ADMIN — RISPERIDONE 4 MILLIGRAM(S): 4 TABLET ORAL at 21:10

## 2018-02-02 RX ADMIN — Medication 2 MILLIGRAM(S): at 21:10

## 2018-02-02 RX ADMIN — MAGNESIUM HYDROXIDE 30 MILLILITER(S): 400 TABLET, CHEWABLE ORAL at 09:00

## 2018-02-02 RX ADMIN — RISPERIDONE 0.5 MILLIGRAM(S): 4 TABLET ORAL at 09:00

## 2018-02-02 RX ADMIN — Medication 100 MILLIGRAM(S): at 21:10

## 2018-02-03 RX ADMIN — Medication 100 MILLIGRAM(S): at 08:43

## 2018-02-03 RX ADMIN — Medication 30 MILLILITER(S): at 13:22

## 2018-02-03 RX ADMIN — SENNA PLUS 2 TABLET(S): 8.6 TABLET ORAL at 21:12

## 2018-02-03 RX ADMIN — RISPERIDONE 4 MILLIGRAM(S): 4 TABLET ORAL at 21:12

## 2018-02-03 RX ADMIN — MAGNESIUM HYDROXIDE 30 MILLILITER(S): 400 TABLET, CHEWABLE ORAL at 08:43

## 2018-02-03 RX ADMIN — Medication 2 MILLIGRAM(S): at 13:22

## 2018-02-03 RX ADMIN — RISPERIDONE 0.5 MILLIGRAM(S): 4 TABLET ORAL at 08:43

## 2018-02-03 RX ADMIN — Medication 100 MILLIGRAM(S): at 21:12

## 2018-02-04 RX ADMIN — Medication 2 MILLIGRAM(S): at 14:19

## 2018-02-04 RX ADMIN — MAGNESIUM HYDROXIDE 30 MILLILITER(S): 400 TABLET, CHEWABLE ORAL at 08:52

## 2018-02-04 RX ADMIN — Medication 100 MILLIGRAM(S): at 08:52

## 2018-02-04 RX ADMIN — RISPERIDONE 0.5 MILLIGRAM(S): 4 TABLET ORAL at 08:52

## 2018-02-04 RX ADMIN — SENNA PLUS 2 TABLET(S): 8.6 TABLET ORAL at 20:34

## 2018-02-04 RX ADMIN — Medication 100 MILLIGRAM(S): at 20:34

## 2018-02-04 RX ADMIN — RISPERIDONE 4 MILLIGRAM(S): 4 TABLET ORAL at 20:32

## 2018-02-04 RX ADMIN — Medication 2 MILLIGRAM(S): at 21:41

## 2018-02-05 RX ORDER — RISPERIDONE 4 MG/1
1 TABLET ORAL DAILY
Qty: 0 | Refills: 0 | Status: DISCONTINUED | OUTPATIENT
Start: 2018-02-06 | End: 2018-02-09

## 2018-02-05 RX ADMIN — Medication 100 MILLIGRAM(S): at 21:02

## 2018-02-05 RX ADMIN — MAGNESIUM HYDROXIDE 30 MILLILITER(S): 400 TABLET, CHEWABLE ORAL at 08:36

## 2018-02-05 RX ADMIN — Medication 100 MILLIGRAM(S): at 08:36

## 2018-02-05 RX ADMIN — SENNA PLUS 2 TABLET(S): 8.6 TABLET ORAL at 21:02

## 2018-02-05 RX ADMIN — RISPERIDONE 0.5 MILLIGRAM(S): 4 TABLET ORAL at 08:36

## 2018-02-05 RX ADMIN — RISPERIDONE 4 MILLIGRAM(S): 4 TABLET ORAL at 21:02

## 2018-02-05 RX ADMIN — Medication 2 MILLIGRAM(S): at 21:35

## 2018-02-05 NOTE — PROGRESS NOTE BEHAVIORAL HEALTH - OTHER
more attention to his ADLs with staff support slowly becoming more  cooperative , polite  despite pt's ongoing lack of insight into his illness and the need for treatment improving slowly normalizing more spontaneous overall though with overall paucity of speech, unelaborative cautious, still with  wary demeanor ." I'm ok." less blunted thinking remains  impoverished ongoing disorganization ongoing though slightly less overt delusions of paranoia, grandeur, somatic and persecutory delusions, ongoing sexual preocculation pt appears to be  significantly  internally preoccupied despite pt reports to the contrary

## 2018-02-06 RX ADMIN — MAGNESIUM HYDROXIDE 30 MILLILITER(S): 400 TABLET, CHEWABLE ORAL at 08:58

## 2018-02-06 RX ADMIN — Medication 30 MILLILITER(S): at 15:41

## 2018-02-06 RX ADMIN — Medication 100 MILLIGRAM(S): at 08:58

## 2018-02-06 RX ADMIN — RISPERIDONE 1 MILLIGRAM(S): 4 TABLET ORAL at 08:58

## 2018-02-06 RX ADMIN — RISPERIDONE 4 MILLIGRAM(S): 4 TABLET ORAL at 21:11

## 2018-02-06 RX ADMIN — SENNA PLUS 2 TABLET(S): 8.6 TABLET ORAL at 21:11

## 2018-02-06 RX ADMIN — Medication 100 MILLIGRAM(S): at 21:11

## 2018-02-06 NOTE — PROGRESS NOTE BEHAVIORAL HEALTH - OTHER
more attention to his ADLs with staff support slowly becoming more  cooperative , polite  despite pt's ongoing lack of insight into his illness and the need for treatment improving slowly normalizing more spontaneous overall though with overall paucity of speech, unelaborative less   wary serafin ." I'm ok." less irritable but still cautious less blunted but still incongruent to mood thinking remains  impoverished ongoing disorganization ongoing though slightly less overt delusions of paranoia, grandeur, somatic and persecutory delusions, ongoing sexual preocculation pt appears to be  significantly  internally preoccupied despite pt reports to the contrary

## 2018-02-07 RX ADMIN — Medication 100 MILLIGRAM(S): at 08:57

## 2018-02-07 RX ADMIN — RISPERIDONE 4 MILLIGRAM(S): 4 TABLET ORAL at 21:10

## 2018-02-07 RX ADMIN — Medication 100 MILLIGRAM(S): at 21:10

## 2018-02-07 RX ADMIN — Medication 2 MILLIGRAM(S): at 17:04

## 2018-02-07 RX ADMIN — RISPERIDONE 1 MILLIGRAM(S): 4 TABLET ORAL at 08:57

## 2018-02-07 RX ADMIN — SENNA PLUS 2 TABLET(S): 8.6 TABLET ORAL at 21:10

## 2018-02-07 RX ADMIN — MAGNESIUM HYDROXIDE 30 MILLILITER(S): 400 TABLET, CHEWABLE ORAL at 08:57

## 2018-02-07 NOTE — PROGRESS NOTE BEHAVIORAL HEALTH - OTHER
more attention to his ADLs with staff support slowly becoming more  cooperative , polite  despite pt's ongoing lack of insight into his illness and the need for treatment improving slowly normalizing more spontaneous overall though with overall paucity of speech, unelaborative less   wary serafin ." I'm ok." less irritable but still cautious less blunted but still incongruent to mood thinking remains  impoverished ongoing disorganization ongoing though slightly less overt delusions of paranoia, grandeur, somatic and persecutory delusions, ongoing sexual preocculation pt continues  internally preoccupied though slightly less so despite pt reports to the contrary

## 2018-02-08 RX ADMIN — SENNA PLUS 2 TABLET(S): 8.6 TABLET ORAL at 21:10

## 2018-02-08 RX ADMIN — Medication 100 MILLIGRAM(S): at 09:08

## 2018-02-08 RX ADMIN — MAGNESIUM HYDROXIDE 30 MILLILITER(S): 400 TABLET, CHEWABLE ORAL at 09:08

## 2018-02-08 RX ADMIN — Medication 2 MILLIGRAM(S): at 19:18

## 2018-02-08 RX ADMIN — Medication 100 MILLIGRAM(S): at 21:10

## 2018-02-08 RX ADMIN — RISPERIDONE 4 MILLIGRAM(S): 4 TABLET ORAL at 21:10

## 2018-02-08 RX ADMIN — RISPERIDONE 1 MILLIGRAM(S): 4 TABLET ORAL at 09:08

## 2018-02-08 NOTE — PROGRESS NOTE BEHAVIORAL HEALTH - OTHER
slowly becoming more  cooperative , polite  despite pt's ongoing lack of insight into his illness and the need for treatment improving slowly normalizing more spontaneous overall though with overall paucity of speech, unelaborative less   wary serafin ." I'm ok." less irritable but still cautious less blunted but still incongruent to mood thinking remains  impoverished more attention to his ADLs with staff support ongoing disorganization ongoing though slightly less overt delusions of paranoia, grandeur, somatic and persecutory delusions, ongoing sexual preocculation pt continues  internally preoccupied though slightly less so despite pt reports to the contrary

## 2018-02-09 RX ORDER — RISPERIDONE 4 MG/1
2 TABLET ORAL DAILY
Qty: 0 | Refills: 0 | Status: DISCONTINUED | OUTPATIENT
Start: 2018-02-10 | End: 2018-02-26

## 2018-02-09 RX ADMIN — Medication 2 MILLIGRAM(S): at 19:43

## 2018-02-09 RX ADMIN — SENNA PLUS 2 TABLET(S): 8.6 TABLET ORAL at 21:16

## 2018-02-09 RX ADMIN — Medication 2 MILLIGRAM(S): at 15:57

## 2018-02-09 RX ADMIN — RISPERIDONE 4 MILLIGRAM(S): 4 TABLET ORAL at 21:16

## 2018-02-09 RX ADMIN — Medication 100 MILLIGRAM(S): at 21:16

## 2018-02-09 RX ADMIN — RISPERIDONE 1 MILLIGRAM(S): 4 TABLET ORAL at 09:09

## 2018-02-09 RX ADMIN — Medication 100 MILLIGRAM(S): at 09:09

## 2018-02-09 RX ADMIN — MAGNESIUM HYDROXIDE 30 MILLILITER(S): 400 TABLET, CHEWABLE ORAL at 09:09

## 2018-02-09 NOTE — PROGRESS NOTE BEHAVIORAL HEALTH - OTHER
slowly normalizing more spontaneous overall though with overall paucity of speech, unelaborative less   wary serafin ." I'm ok." less irritable but still cautious less blunted but still incongruent to mood thinking remains  impoverished ongoing disorganization ongoing though slightly less overt delusions of paranoia, grandeur, somatic and persecutory delusions, ongoing sexual preocculation pt continues  internally preoccupied though slightly less so despite pt reports to the contrary more attention to his ADLs with staff support slowly becoming more  cooperative , polite  despite pt's ongoing lack of insight into his illness and the need for treatment improving

## 2018-02-10 RX ADMIN — Medication 30 MILLILITER(S): at 15:34

## 2018-02-10 RX ADMIN — RISPERIDONE 2 MILLIGRAM(S): 4 TABLET ORAL at 09:28

## 2018-02-10 RX ADMIN — Medication 100 MILLIGRAM(S): at 20:58

## 2018-02-10 RX ADMIN — Medication 2 MILLIGRAM(S): at 15:56

## 2018-02-10 RX ADMIN — Medication 100 MILLIGRAM(S): at 09:27

## 2018-02-10 RX ADMIN — SENNA PLUS 2 TABLET(S): 8.6 TABLET ORAL at 20:57

## 2018-02-10 RX ADMIN — Medication 2 MILLIGRAM(S): at 19:57

## 2018-02-10 RX ADMIN — RISPERIDONE 4 MILLIGRAM(S): 4 TABLET ORAL at 20:57

## 2018-02-10 RX ADMIN — Medication 2 MILLIGRAM(S): at 12:36

## 2018-02-10 RX ADMIN — MAGNESIUM HYDROXIDE 30 MILLILITER(S): 400 TABLET, CHEWABLE ORAL at 09:28

## 2018-02-11 RX ADMIN — Medication 2 MILLIGRAM(S): at 15:47

## 2018-02-11 RX ADMIN — Medication 100 MILLIGRAM(S): at 09:08

## 2018-02-11 RX ADMIN — Medication 2 MILLIGRAM(S): at 21:06

## 2018-02-11 RX ADMIN — RISPERIDONE 2 MILLIGRAM(S): 4 TABLET ORAL at 09:08

## 2018-02-11 RX ADMIN — Medication 2 MILLIGRAM(S): at 12:01

## 2018-02-11 RX ADMIN — Medication 100 MILLIGRAM(S): at 21:05

## 2018-02-11 RX ADMIN — RISPERIDONE 4 MILLIGRAM(S): 4 TABLET ORAL at 21:05

## 2018-02-11 RX ADMIN — MAGNESIUM HYDROXIDE 30 MILLILITER(S): 400 TABLET, CHEWABLE ORAL at 09:08

## 2018-02-11 RX ADMIN — SENNA PLUS 2 TABLET(S): 8.6 TABLET ORAL at 21:05

## 2018-02-12 LAB
ALBUMIN SERPL ELPH-MCNC: 3.7 G/DL — SIGNIFICANT CHANGE UP (ref 3.3–5)
ALP SERPL-CCNC: 54 U/L — SIGNIFICANT CHANGE UP (ref 40–120)
ALT FLD-CCNC: 44 U/L — SIGNIFICANT CHANGE UP (ref 12–78)
ANION GAP SERPL CALC-SCNC: 6 MMOL/L — SIGNIFICANT CHANGE UP (ref 5–17)
AST SERPL-CCNC: 21 U/L — SIGNIFICANT CHANGE UP (ref 15–37)
BASOPHILS # BLD AUTO: 0.1 K/UL — SIGNIFICANT CHANGE UP (ref 0–0.2)
BASOPHILS NFR BLD AUTO: 1.5 % — SIGNIFICANT CHANGE UP (ref 0–2)
BILIRUB SERPL-MCNC: 0.4 MG/DL — SIGNIFICANT CHANGE UP (ref 0.2–1.2)
BUN SERPL-MCNC: 14 MG/DL — SIGNIFICANT CHANGE UP (ref 7–23)
CALCIUM SERPL-MCNC: 8.8 MG/DL — SIGNIFICANT CHANGE UP (ref 8.5–10.1)
CHLORIDE SERPL-SCNC: 105 MMOL/L — SIGNIFICANT CHANGE UP (ref 96–108)
CO2 SERPL-SCNC: 30 MMOL/L — SIGNIFICANT CHANGE UP (ref 22–31)
CREAT SERPL-MCNC: 0.88 MG/DL — SIGNIFICANT CHANGE UP (ref 0.5–1.3)
EOSINOPHIL # BLD AUTO: 0.2 K/UL — SIGNIFICANT CHANGE UP (ref 0–0.5)
EOSINOPHIL NFR BLD AUTO: 4.3 % — SIGNIFICANT CHANGE UP (ref 0–6)
GLUCOSE SERPL-MCNC: 80 MG/DL — SIGNIFICANT CHANGE UP (ref 70–99)
HCT VFR BLD CALC: 42.6 % — SIGNIFICANT CHANGE UP (ref 39–50)
HGB BLD-MCNC: 14.1 G/DL — SIGNIFICANT CHANGE UP (ref 13–17)
LYMPHOCYTES # BLD AUTO: 2.1 K/UL — SIGNIFICANT CHANGE UP (ref 1–3.3)
LYMPHOCYTES # BLD AUTO: 41.7 % — SIGNIFICANT CHANGE UP (ref 13–44)
MCHC RBC-ENTMCNC: 28.3 PG — SIGNIFICANT CHANGE UP (ref 27–34)
MCHC RBC-ENTMCNC: 33.2 GM/DL — SIGNIFICANT CHANGE UP (ref 32–36)
MCV RBC AUTO: 85.4 FL — SIGNIFICANT CHANGE UP (ref 80–100)
MONOCYTES # BLD AUTO: 0.4 K/UL — SIGNIFICANT CHANGE UP (ref 0–0.9)
MONOCYTES NFR BLD AUTO: 8.6 % — SIGNIFICANT CHANGE UP (ref 2–14)
NEUTROPHILS # BLD AUTO: 2.2 K/UL — SIGNIFICANT CHANGE UP (ref 1.8–7.4)
NEUTROPHILS NFR BLD AUTO: 43.9 % — SIGNIFICANT CHANGE UP (ref 43–77)
PLATELET # BLD AUTO: 180 K/UL — SIGNIFICANT CHANGE UP (ref 150–400)
POTASSIUM SERPL-MCNC: 4.1 MMOL/L — SIGNIFICANT CHANGE UP (ref 3.5–5.3)
POTASSIUM SERPL-SCNC: 4.1 MMOL/L — SIGNIFICANT CHANGE UP (ref 3.5–5.3)
PROT SERPL-MCNC: 6.4 GM/DL — SIGNIFICANT CHANGE UP (ref 6–8.3)
RBC # BLD: 4.99 M/UL — SIGNIFICANT CHANGE UP (ref 4.2–5.8)
RBC # FLD: 12.1 % — SIGNIFICANT CHANGE UP (ref 10.3–14.5)
SODIUM SERPL-SCNC: 141 MMOL/L — SIGNIFICANT CHANGE UP (ref 135–145)
WBC # BLD: 5.1 K/UL — SIGNIFICANT CHANGE UP (ref 3.8–10.5)
WBC # FLD AUTO: 5.1 K/UL — SIGNIFICANT CHANGE UP (ref 3.8–10.5)

## 2018-02-12 RX ADMIN — SENNA PLUS 2 TABLET(S): 8.6 TABLET ORAL at 20:08

## 2018-02-12 RX ADMIN — RISPERIDONE 2 MILLIGRAM(S): 4 TABLET ORAL at 09:08

## 2018-02-12 RX ADMIN — Medication 30 MILLILITER(S): at 20:10

## 2018-02-12 RX ADMIN — Medication 100 MILLIGRAM(S): at 09:08

## 2018-02-12 RX ADMIN — RISPERIDONE 4 MILLIGRAM(S): 4 TABLET ORAL at 20:08

## 2018-02-12 RX ADMIN — MAGNESIUM HYDROXIDE 30 MILLILITER(S): 400 TABLET, CHEWABLE ORAL at 09:08

## 2018-02-12 RX ADMIN — Medication 100 MILLIGRAM(S): at 20:08

## 2018-02-12 NOTE — PROGRESS NOTE BEHAVIORAL HEALTH - OTHER
more attention to his ADLs with staff support slowly becoming more  cooperative , polite  despite pt's ongoing lack of insight into his illness and the need for treatment improving slowly normalizing more spontaneous overall though with overall paucity of speech, unelaborative less   wary serafin ." I'm ok." less irritable but still cautious thinking less   impoverished ongoing though less  disorganization overall ongoing though  less overt delusions of paranoia, grandeur, somatic and persecutory delusions, ongoing sexual preocculation pt appears less  internally preoccupied overall

## 2018-02-13 RX ORDER — POLYETHYLENE GLYCOL 3350 17 G/17G
17 POWDER, FOR SOLUTION ORAL
Qty: 0 | Refills: 0 | Status: DISCONTINUED | OUTPATIENT
Start: 2018-02-13 | End: 2018-02-26

## 2018-02-13 RX ADMIN — Medication 2 MILLIGRAM(S): at 15:17

## 2018-02-13 RX ADMIN — MAGNESIUM HYDROXIDE 30 MILLILITER(S): 400 TABLET, CHEWABLE ORAL at 08:33

## 2018-02-13 RX ADMIN — RISPERIDONE 4 MILLIGRAM(S): 4 TABLET ORAL at 21:04

## 2018-02-13 RX ADMIN — SENNA PLUS 2 TABLET(S): 8.6 TABLET ORAL at 21:03

## 2018-02-13 RX ADMIN — POLYETHYLENE GLYCOL 3350 17 GRAM(S): 17 POWDER, FOR SOLUTION ORAL at 21:05

## 2018-02-13 RX ADMIN — RISPERIDONE 2 MILLIGRAM(S): 4 TABLET ORAL at 08:33

## 2018-02-13 RX ADMIN — Medication 100 MILLIGRAM(S): at 21:05

## 2018-02-13 RX ADMIN — Medication 25 MILLIGRAM(S): at 22:50

## 2018-02-13 RX ADMIN — Medication 100 MILLIGRAM(S): at 08:33

## 2018-02-13 RX ADMIN — Medication 2 MILLIGRAM(S): at 21:03

## 2018-02-13 NOTE — PROGRESS NOTE BEHAVIORAL HEALTH - OTHER
more attention to his ADLs with staff support slowly becoming more  cooperative , polite  despite pt's ongoing lack of insight into his illness and the need for treatment improving slowly normalizing more spontaneous overall though with overall paucity of speech,  cautiously non disclosing less   wary serafin ." I'm ok." less irritable but still cautious thinking less   impoverished ongoing though less  disorganization overall ongoing though  less overt delusions of paranoia, grandeur, somatic and persecutory delusions, ongoing sexual preocculation pt appears less  internally preoccupied overall

## 2018-02-14 RX ADMIN — RISPERIDONE 4 MILLIGRAM(S): 4 TABLET ORAL at 20:33

## 2018-02-14 RX ADMIN — Medication 2 MILLIGRAM(S): at 16:20

## 2018-02-14 RX ADMIN — SENNA PLUS 2 TABLET(S): 8.6 TABLET ORAL at 20:33

## 2018-02-14 RX ADMIN — Medication 2 MILLIGRAM(S): at 20:33

## 2018-02-14 RX ADMIN — RISPERIDONE 2 MILLIGRAM(S): 4 TABLET ORAL at 08:42

## 2018-02-14 RX ADMIN — MAGNESIUM HYDROXIDE 30 MILLILITER(S): 400 TABLET, CHEWABLE ORAL at 08:42

## 2018-02-14 RX ADMIN — POLYETHYLENE GLYCOL 3350 17 GRAM(S): 17 POWDER, FOR SOLUTION ORAL at 08:42

## 2018-02-14 RX ADMIN — POLYETHYLENE GLYCOL 3350 17 GRAM(S): 17 POWDER, FOR SOLUTION ORAL at 20:33

## 2018-02-14 RX ADMIN — Medication 100 MILLIGRAM(S): at 08:42

## 2018-02-14 RX ADMIN — Medication 100 MILLIGRAM(S): at 20:33

## 2018-02-14 NOTE — PROGRESS NOTE BEHAVIORAL HEALTH - OTHER
slowly normalizing more spontaneous overall though with overall paucity of speech,  cautiously non disclosing less   wary serafin ." I'm ok." less irritable but still cautious thinking less   impoverished ongoing though less  disorganization overall ongoing though  less overt delusions of paranoia, grandeur, somatic and persecutory delusions, ongoing sexual preocculation pt appears less  internally preoccupied overall more attention to his ADLs with staff support slowly becoming more  cooperative , polite  despite pt's ongoing lack of insight into his illness and the need for treatment improving

## 2018-02-15 RX ADMIN — POLYETHYLENE GLYCOL 3350 17 GRAM(S): 17 POWDER, FOR SOLUTION ORAL at 08:30

## 2018-02-15 RX ADMIN — SENNA PLUS 2 TABLET(S): 8.6 TABLET ORAL at 20:41

## 2018-02-15 RX ADMIN — Medication 2 MILLIGRAM(S): at 18:58

## 2018-02-15 RX ADMIN — Medication 100 MILLIGRAM(S): at 20:41

## 2018-02-15 RX ADMIN — Medication 30 MILLILITER(S): at 08:29

## 2018-02-15 RX ADMIN — Medication 30 MILLILITER(S): at 18:59

## 2018-02-15 RX ADMIN — RISPERIDONE 2 MILLIGRAM(S): 4 TABLET ORAL at 08:29

## 2018-02-15 RX ADMIN — MAGNESIUM HYDROXIDE 30 MILLILITER(S): 400 TABLET, CHEWABLE ORAL at 08:29

## 2018-02-15 RX ADMIN — Medication 100 MILLIGRAM(S): at 08:29

## 2018-02-15 RX ADMIN — POLYETHYLENE GLYCOL 3350 17 GRAM(S): 17 POWDER, FOR SOLUTION ORAL at 20:41

## 2018-02-15 RX ADMIN — RISPERIDONE 4 MILLIGRAM(S): 4 TABLET ORAL at 20:41

## 2018-02-16 RX ADMIN — Medication 100 MILLIGRAM(S): at 22:38

## 2018-02-16 RX ADMIN — RISPERIDONE 2 MILLIGRAM(S): 4 TABLET ORAL at 09:16

## 2018-02-16 RX ADMIN — POLYETHYLENE GLYCOL 3350 17 GRAM(S): 17 POWDER, FOR SOLUTION ORAL at 09:16

## 2018-02-16 RX ADMIN — Medication 2 MILLIGRAM(S): at 11:56

## 2018-02-16 RX ADMIN — Medication 30 MILLILITER(S): at 17:36

## 2018-02-16 RX ADMIN — POLYETHYLENE GLYCOL 3350 17 GRAM(S): 17 POWDER, FOR SOLUTION ORAL at 22:38

## 2018-02-16 RX ADMIN — RISPERIDONE 4 MILLIGRAM(S): 4 TABLET ORAL at 22:38

## 2018-02-16 RX ADMIN — MAGNESIUM HYDROXIDE 30 MILLILITER(S): 400 TABLET, CHEWABLE ORAL at 09:15

## 2018-02-16 RX ADMIN — Medication 100 MILLIGRAM(S): at 09:15

## 2018-02-16 RX ADMIN — Medication 30 MILLILITER(S): at 11:55

## 2018-02-16 RX ADMIN — SENNA PLUS 2 TABLET(S): 8.6 TABLET ORAL at 22:39

## 2018-02-16 NOTE — PROGRESS NOTE BEHAVIORAL HEALTH - OTHER
slowly normalizing more spontaneous overall though with overall paucity of speech,  cautiously non disclosing less   wary serafin ." I'm ok." less irritable but still cautious thinking less   impoverished but concrete ongoing though less  disorganization overall ongoing though  less overt delusions of paranoia, grandeur, somatic and persecutory delusions, ongoing sexual preocculation pt appears less  internally preoccupied overall more attention to his ADLs with staff support slowly becoming more  cooperative , polite  despite pt's ongoing lack of insight into his illness and the need for treatment improving

## 2018-02-17 RX ADMIN — Medication 100 MILLIGRAM(S): at 09:01

## 2018-02-17 RX ADMIN — HALOPERIDOL DECANOATE 5 MILLIGRAM(S): 100 INJECTION INTRAMUSCULAR at 20:36

## 2018-02-17 RX ADMIN — Medication 25 MILLIGRAM(S): at 20:36

## 2018-02-17 RX ADMIN — RISPERIDONE 4 MILLIGRAM(S): 4 TABLET ORAL at 20:04

## 2018-02-17 RX ADMIN — POLYETHYLENE GLYCOL 3350 17 GRAM(S): 17 POWDER, FOR SOLUTION ORAL at 20:03

## 2018-02-17 RX ADMIN — Medication 100 MILLIGRAM(S): at 20:03

## 2018-02-17 RX ADMIN — SENNA PLUS 2 TABLET(S): 8.6 TABLET ORAL at 20:03

## 2018-02-17 RX ADMIN — RISPERIDONE 2 MILLIGRAM(S): 4 TABLET ORAL at 09:01

## 2018-02-17 RX ADMIN — POLYETHYLENE GLYCOL 3350 17 GRAM(S): 17 POWDER, FOR SOLUTION ORAL at 09:02

## 2018-02-17 RX ADMIN — Medication 30 MILLILITER(S): at 16:06

## 2018-02-17 RX ADMIN — MAGNESIUM HYDROXIDE 30 MILLILITER(S): 400 TABLET, CHEWABLE ORAL at 09:01

## 2018-02-18 RX ADMIN — Medication 100 MILLIGRAM(S): at 20:48

## 2018-02-18 RX ADMIN — RISPERIDONE 2 MILLIGRAM(S): 4 TABLET ORAL at 09:22

## 2018-02-18 RX ADMIN — SENNA PLUS 2 TABLET(S): 8.6 TABLET ORAL at 20:48

## 2018-02-18 RX ADMIN — Medication 100 MILLIGRAM(S): at 09:22

## 2018-02-18 RX ADMIN — POLYETHYLENE GLYCOL 3350 17 GRAM(S): 17 POWDER, FOR SOLUTION ORAL at 09:22

## 2018-02-18 RX ADMIN — MAGNESIUM HYDROXIDE 30 MILLILITER(S): 400 TABLET, CHEWABLE ORAL at 09:22

## 2018-02-18 RX ADMIN — RISPERIDONE 4 MILLIGRAM(S): 4 TABLET ORAL at 20:48

## 2018-02-18 RX ADMIN — POLYETHYLENE GLYCOL 3350 17 GRAM(S): 17 POWDER, FOR SOLUTION ORAL at 20:48

## 2018-02-19 RX ADMIN — RISPERIDONE 4 MILLIGRAM(S): 4 TABLET ORAL at 20:41

## 2018-02-19 RX ADMIN — RISPERIDONE 2 MILLIGRAM(S): 4 TABLET ORAL at 08:52

## 2018-02-19 RX ADMIN — POLYETHYLENE GLYCOL 3350 17 GRAM(S): 17 POWDER, FOR SOLUTION ORAL at 20:41

## 2018-02-19 RX ADMIN — Medication 100 MILLIGRAM(S): at 20:41

## 2018-02-19 RX ADMIN — SENNA PLUS 2 TABLET(S): 8.6 TABLET ORAL at 20:41

## 2018-02-19 RX ADMIN — MAGNESIUM HYDROXIDE 30 MILLILITER(S): 400 TABLET, CHEWABLE ORAL at 08:51

## 2018-02-19 RX ADMIN — POLYETHYLENE GLYCOL 3350 17 GRAM(S): 17 POWDER, FOR SOLUTION ORAL at 08:52

## 2018-02-19 RX ADMIN — Medication 100 MILLIGRAM(S): at 08:52

## 2018-02-20 RX ADMIN — SENNA PLUS 2 TABLET(S): 8.6 TABLET ORAL at 21:01

## 2018-02-20 RX ADMIN — Medication 100 MILLIGRAM(S): at 09:38

## 2018-02-20 RX ADMIN — MAGNESIUM HYDROXIDE 30 MILLILITER(S): 400 TABLET, CHEWABLE ORAL at 09:38

## 2018-02-20 RX ADMIN — Medication 100 MILLIGRAM(S): at 21:01

## 2018-02-20 RX ADMIN — RISPERIDONE 2 MILLIGRAM(S): 4 TABLET ORAL at 09:38

## 2018-02-20 RX ADMIN — POLYETHYLENE GLYCOL 3350 17 GRAM(S): 17 POWDER, FOR SOLUTION ORAL at 21:01

## 2018-02-20 RX ADMIN — Medication 30 MILLILITER(S): at 17:18

## 2018-02-20 RX ADMIN — POLYETHYLENE GLYCOL 3350 17 GRAM(S): 17 POWDER, FOR SOLUTION ORAL at 09:38

## 2018-02-20 RX ADMIN — RISPERIDONE 4 MILLIGRAM(S): 4 TABLET ORAL at 21:01

## 2018-02-20 NOTE — PROGRESS NOTE BEHAVIORAL HEALTH - OTHER
more attention to his ADLs with staff support slowly becoming more  cooperative , polite  despite pt's ongoing lack of insight into his illness and the need for treatment improving slowly normalizing more spontaneous  though rehearsed sounding , still with overall paucity of speech,  cautiously non disclosing less   wary serafin ." I'm ok." less irritable but still cautious thinking less   impoverished but concrete ongoing though less  disorganization overall ongoing though  less overt delusions of paranoia, grandeur, somatic and persecutory delusions, ongoing sexual preocculation pt appears less  internally preoccupied overall

## 2018-02-21 RX ORDER — PALIPERIDONE 1.5 MG/1
234 TABLET, EXTENDED RELEASE ORAL
Qty: 0 | Refills: 0 | Status: DISCONTINUED | OUTPATIENT
Start: 2018-02-22 | End: 2018-02-26

## 2018-02-21 RX ADMIN — RISPERIDONE 2 MILLIGRAM(S): 4 TABLET ORAL at 09:29

## 2018-02-21 RX ADMIN — POLYETHYLENE GLYCOL 3350 17 GRAM(S): 17 POWDER, FOR SOLUTION ORAL at 09:29

## 2018-02-21 RX ADMIN — SENNA PLUS 2 TABLET(S): 8.6 TABLET ORAL at 20:54

## 2018-02-21 RX ADMIN — MAGNESIUM HYDROXIDE 30 MILLILITER(S): 400 TABLET, CHEWABLE ORAL at 09:29

## 2018-02-21 RX ADMIN — Medication 100 MILLIGRAM(S): at 09:29

## 2018-02-21 RX ADMIN — Medication 100 MILLIGRAM(S): at 20:54

## 2018-02-21 RX ADMIN — RISPERIDONE 4 MILLIGRAM(S): 4 TABLET ORAL at 20:54

## 2018-02-21 RX ADMIN — POLYETHYLENE GLYCOL 3350 17 GRAM(S): 17 POWDER, FOR SOLUTION ORAL at 20:55

## 2018-02-22 RX ADMIN — POLYETHYLENE GLYCOL 3350 17 GRAM(S): 17 POWDER, FOR SOLUTION ORAL at 20:45

## 2018-02-22 RX ADMIN — RISPERIDONE 4 MILLIGRAM(S): 4 TABLET ORAL at 20:45

## 2018-02-22 RX ADMIN — Medication 100 MILLIGRAM(S): at 20:46

## 2018-02-22 RX ADMIN — Medication 30 MILLILITER(S): at 20:13

## 2018-02-22 RX ADMIN — POLYETHYLENE GLYCOL 3350 17 GRAM(S): 17 POWDER, FOR SOLUTION ORAL at 09:16

## 2018-02-22 RX ADMIN — Medication 100 MILLIGRAM(S): at 09:15

## 2018-02-22 RX ADMIN — PALIPERIDONE 234 MILLIGRAM(S): 1.5 TABLET, EXTENDED RELEASE ORAL at 09:20

## 2018-02-22 RX ADMIN — RISPERIDONE 2 MILLIGRAM(S): 4 TABLET ORAL at 09:15

## 2018-02-22 RX ADMIN — MAGNESIUM HYDROXIDE 30 MILLILITER(S): 400 TABLET, CHEWABLE ORAL at 09:15

## 2018-02-22 RX ADMIN — SENNA PLUS 2 TABLET(S): 8.6 TABLET ORAL at 20:46

## 2018-02-23 PROCEDURE — 73600 X-RAY EXAM OF ANKLE: CPT | Mod: 26

## 2018-02-23 RX ADMIN — Medication 600 MILLIGRAM(S): at 21:48

## 2018-02-23 RX ADMIN — Medication 100 MILLIGRAM(S): at 21:48

## 2018-02-23 RX ADMIN — Medication 650 MILLIGRAM(S): at 18:54

## 2018-02-23 RX ADMIN — RISPERIDONE 2 MILLIGRAM(S): 4 TABLET ORAL at 09:41

## 2018-02-23 RX ADMIN — Medication 100 MILLIGRAM(S): at 09:40

## 2018-02-23 RX ADMIN — RISPERIDONE 4 MILLIGRAM(S): 4 TABLET ORAL at 21:48

## 2018-02-23 RX ADMIN — POLYETHYLENE GLYCOL 3350 17 GRAM(S): 17 POWDER, FOR SOLUTION ORAL at 21:48

## 2018-02-23 RX ADMIN — POLYETHYLENE GLYCOL 3350 17 GRAM(S): 17 POWDER, FOR SOLUTION ORAL at 09:41

## 2018-02-23 RX ADMIN — MAGNESIUM HYDROXIDE 30 MILLILITER(S): 400 TABLET, CHEWABLE ORAL at 09:40

## 2018-02-23 RX ADMIN — SENNA PLUS 2 TABLET(S): 8.6 TABLET ORAL at 21:48

## 2018-02-23 NOTE — PROGRESS NOTE BEHAVIORAL HEALTH - OTHER
more attention to his ADLs with staff support slowly becoming more  cooperative , polite  despite pt's ongoing lack of insight into his illness and the need for treatment improving slowly normalizing more spontaneous  though rehearsed sounding , still with overall paucity of speech,  cautiously non disclosing still suspicious,   wary demeanor ." I'm ok." thinking less   impoverished but concrete ongoing though less  disorganization overall ongoing though  less overt delusions of paranoia, grandeur, somatic and persecutory delusions, ongoing sexual preocculation pt appears less  internally preoccupied overall

## 2018-02-24 LAB
ALBUMIN SERPL ELPH-MCNC: 3.7 G/DL — SIGNIFICANT CHANGE UP (ref 3.3–5)
ALP SERPL-CCNC: 53 U/L — SIGNIFICANT CHANGE UP (ref 40–120)
ALT FLD-CCNC: 57 U/L — SIGNIFICANT CHANGE UP (ref 12–78)
ANION GAP SERPL CALC-SCNC: 6 MMOL/L — SIGNIFICANT CHANGE UP (ref 5–17)
AST SERPL-CCNC: 22 U/L — SIGNIFICANT CHANGE UP (ref 15–37)
BASOPHILS # BLD AUTO: 0 K/UL — SIGNIFICANT CHANGE UP (ref 0–0.2)
BASOPHILS NFR BLD AUTO: 0.8 % — SIGNIFICANT CHANGE UP (ref 0–2)
BILIRUB SERPL-MCNC: 0.5 MG/DL — SIGNIFICANT CHANGE UP (ref 0.2–1.2)
BUN SERPL-MCNC: 16 MG/DL — SIGNIFICANT CHANGE UP (ref 7–23)
CALCIUM SERPL-MCNC: 8.8 MG/DL — SIGNIFICANT CHANGE UP (ref 8.5–10.1)
CHLORIDE SERPL-SCNC: 103 MMOL/L — SIGNIFICANT CHANGE UP (ref 96–108)
CHOLEST SERPL-MCNC: 178 MG/DL — SIGNIFICANT CHANGE UP (ref 10–199)
CO2 SERPL-SCNC: 30 MMOL/L — SIGNIFICANT CHANGE UP (ref 22–31)
CREAT SERPL-MCNC: 0.88 MG/DL — SIGNIFICANT CHANGE UP (ref 0.5–1.3)
EOSINOPHIL # BLD AUTO: 0.3 K/UL — SIGNIFICANT CHANGE UP (ref 0–0.5)
EOSINOPHIL NFR BLD AUTO: 6.1 % — HIGH (ref 0–6)
GLUCOSE SERPL-MCNC: 80 MG/DL — SIGNIFICANT CHANGE UP (ref 70–99)
HBA1C BLD-MCNC: 5.5 % — SIGNIFICANT CHANGE UP (ref 4–5.6)
HCT VFR BLD CALC: 46.7 % — SIGNIFICANT CHANGE UP (ref 39–50)
HDLC SERPL-MCNC: 70 MG/DL — SIGNIFICANT CHANGE UP (ref 40–125)
HGB BLD-MCNC: 15 G/DL — SIGNIFICANT CHANGE UP (ref 13–17)
LIPID PNL WITH DIRECT LDL SERPL: 95 MG/DL — SIGNIFICANT CHANGE UP
LYMPHOCYTES # BLD AUTO: 1.9 K/UL — SIGNIFICANT CHANGE UP (ref 1–3.3)
LYMPHOCYTES # BLD AUTO: 39.7 % — SIGNIFICANT CHANGE UP (ref 13–44)
MCHC RBC-ENTMCNC: 27.6 PG — SIGNIFICANT CHANGE UP (ref 27–34)
MCHC RBC-ENTMCNC: 32.1 GM/DL — SIGNIFICANT CHANGE UP (ref 32–36)
MCV RBC AUTO: 85.9 FL — SIGNIFICANT CHANGE UP (ref 80–100)
MONOCYTES # BLD AUTO: 0.5 K/UL — SIGNIFICANT CHANGE UP (ref 0–0.9)
MONOCYTES NFR BLD AUTO: 9.7 % — SIGNIFICANT CHANGE UP (ref 2–14)
NEUTROPHILS # BLD AUTO: 2.1 K/UL — SIGNIFICANT CHANGE UP (ref 1.8–7.4)
NEUTROPHILS NFR BLD AUTO: 43.7 % — SIGNIFICANT CHANGE UP (ref 43–77)
PLATELET # BLD AUTO: 168 K/UL — SIGNIFICANT CHANGE UP (ref 150–400)
POTASSIUM SERPL-MCNC: 4.1 MMOL/L — SIGNIFICANT CHANGE UP (ref 3.5–5.3)
POTASSIUM SERPL-SCNC: 4.1 MMOL/L — SIGNIFICANT CHANGE UP (ref 3.5–5.3)
PROT SERPL-MCNC: 6.7 GM/DL — SIGNIFICANT CHANGE UP (ref 6–8.3)
RBC # BLD: 5.44 M/UL — SIGNIFICANT CHANGE UP (ref 4.2–5.8)
RBC # FLD: 12.3 % — SIGNIFICANT CHANGE UP (ref 10.3–14.5)
SODIUM SERPL-SCNC: 139 MMOL/L — SIGNIFICANT CHANGE UP (ref 135–145)
TOTAL CHOLESTEROL/HDL RATIO MEASUREMENT: 2.5 RATIO — LOW (ref 3.4–9.6)
TRIGL SERPL-MCNC: 64 MG/DL — SIGNIFICANT CHANGE UP (ref 10–149)
WBC # BLD: 4.9 K/UL — SIGNIFICANT CHANGE UP (ref 3.8–10.5)
WBC # FLD AUTO: 4.9 K/UL — SIGNIFICANT CHANGE UP (ref 3.8–10.5)

## 2018-02-24 RX ADMIN — POLYETHYLENE GLYCOL 3350 17 GRAM(S): 17 POWDER, FOR SOLUTION ORAL at 09:05

## 2018-02-24 RX ADMIN — RISPERIDONE 2 MILLIGRAM(S): 4 TABLET ORAL at 09:05

## 2018-02-24 RX ADMIN — SENNA PLUS 2 TABLET(S): 8.6 TABLET ORAL at 20:02

## 2018-02-24 RX ADMIN — Medication 100 MILLIGRAM(S): at 20:03

## 2018-02-24 RX ADMIN — MAGNESIUM HYDROXIDE 30 MILLILITER(S): 400 TABLET, CHEWABLE ORAL at 09:06

## 2018-02-24 RX ADMIN — RISPERIDONE 4 MILLIGRAM(S): 4 TABLET ORAL at 20:03

## 2018-02-24 RX ADMIN — Medication 100 MILLIGRAM(S): at 09:05

## 2018-02-24 RX ADMIN — POLYETHYLENE GLYCOL 3350 17 GRAM(S): 17 POWDER, FOR SOLUTION ORAL at 20:03

## 2018-02-25 RX ADMIN — RISPERIDONE 2 MILLIGRAM(S): 4 TABLET ORAL at 09:17

## 2018-02-25 RX ADMIN — Medication 100 MILLIGRAM(S): at 20:13

## 2018-02-25 RX ADMIN — SENNA PLUS 2 TABLET(S): 8.6 TABLET ORAL at 20:13

## 2018-02-25 RX ADMIN — MAGNESIUM HYDROXIDE 30 MILLILITER(S): 400 TABLET, CHEWABLE ORAL at 09:17

## 2018-02-25 RX ADMIN — RISPERIDONE 4 MILLIGRAM(S): 4 TABLET ORAL at 20:13

## 2018-02-25 RX ADMIN — Medication 25 MILLIGRAM(S): at 23:35

## 2018-02-25 RX ADMIN — POLYETHYLENE GLYCOL 3350 17 GRAM(S): 17 POWDER, FOR SOLUTION ORAL at 09:19

## 2018-02-25 RX ADMIN — POLYETHYLENE GLYCOL 3350 17 GRAM(S): 17 POWDER, FOR SOLUTION ORAL at 20:13

## 2018-02-25 RX ADMIN — Medication 100 MILLIGRAM(S): at 09:17

## 2018-02-26 VITALS
RESPIRATION RATE: 16 BRPM | HEART RATE: 83 BPM | DIASTOLIC BLOOD PRESSURE: 69 MMHG | SYSTOLIC BLOOD PRESSURE: 112 MMHG | OXYGEN SATURATION: 100 % | TEMPERATURE: 98 F

## 2018-02-26 RX ORDER — SENNA PLUS 8.6 MG/1
2 TABLET ORAL
Qty: 28 | Refills: 1 | OUTPATIENT
Start: 2018-02-26 | End: 2018-03-25

## 2018-02-26 RX ORDER — HYDROXYZINE HCL 10 MG
1 TABLET ORAL
Qty: 56 | Refills: 1 | OUTPATIENT
Start: 2018-02-26 | End: 2018-03-25

## 2018-02-26 RX ORDER — DOCUSATE SODIUM 100 MG
1 CAPSULE ORAL
Qty: 28 | Refills: 1 | OUTPATIENT
Start: 2018-02-26 | End: 2018-03-25

## 2018-02-26 RX ORDER — RISPERIDONE 4 MG/1
1 TABLET ORAL
Qty: 14 | Refills: 1 | OUTPATIENT
Start: 2018-02-26 | End: 2018-03-25

## 2018-02-26 RX ORDER — PALIPERIDONE 1.5 MG/1
1 TABLET, EXTENDED RELEASE ORAL
Qty: 1 | Refills: 1 | OUTPATIENT
Start: 2018-02-26 | End: 2018-04-26

## 2018-02-26 RX ORDER — NICOTINE POLACRILEX 2 MG
0 GUM BUCCAL
Qty: 0 | Refills: 0 | COMMUNITY
Start: 2018-02-26

## 2018-02-26 RX ADMIN — MAGNESIUM HYDROXIDE 30 MILLILITER(S): 400 TABLET, CHEWABLE ORAL at 09:10

## 2018-02-26 RX ADMIN — RISPERIDONE 2 MILLIGRAM(S): 4 TABLET ORAL at 09:10

## 2018-02-26 RX ADMIN — POLYETHYLENE GLYCOL 3350 17 GRAM(S): 17 POWDER, FOR SOLUTION ORAL at 09:10

## 2018-02-26 RX ADMIN — Medication 100 MILLIGRAM(S): at 09:10

## 2018-02-26 NOTE — PROGRESS NOTE BEHAVIORAL HEALTH - NSBHATTESTSEENBY_PSY_A_CORE
attending Psychiatrist without NP/Trainee

## 2018-02-26 NOTE — PROGRESS NOTE BEHAVIORAL HEALTH - PROBLEM SELECTOR PROBLEM 3
Alcohol use disorder, moderate, in controlled environment

## 2018-02-26 NOTE — PROGRESS NOTE BEHAVIORAL HEALTH - SUMMARY
Patient is a 21 y/o AA male with h/o Schizophrenia, marijuana abuse, post-traumatic stress disorder, with two past psych hospitalizations, bib Aunt (legal guardian) with Aunt complaining of pt. hearing voices and being internally preoccupied.  Patient. with 1 arrest, no violence, no suicide attempts, no self injury.    Patient reports that he is in ER because he has been pacing lately, not sleeping well.  He has extra energy and he has been taking very long distance walks.  He endorses not being on psych meds for a few years.  He use to be on antipsychotics a few years ago.  He remembers being on Seroquel and Risperdal.  But he remembers Seroquel gave him nightmares and made him very tired.  He most recently got fired from his job that he has had since this past summer.  He did not know why he was fired.  Patient. was vague in some of his answers and he laughed and seemed to be responding to internal stimuli.  But pt. denied that he was hearing voices.  He admitted to smoking weed atleast 2-3 times per week.   Patient reports he will sign in voluntarily to get back on meds.  Patient does not have any friends or romantic relationships.
Patient is a 23 y/o AA male with h/o Schizophrenia, marijuana abuse, post-traumatic stress disorder, with two past psych hospitalizations, bib Aunt (legal guardian) with Aunt complaining of pt. hearing voices and being internally preoccupied.  Patient. with 1 arrest, no violence, no suicide attempts, no self injury.    Patient reports that he is in ER because he has been pacing lately, not sleeping well.  He has extra energy and he has been taking very long distance walks.  He endorses not being on psych meds for a few years.  He use to be on antipsychotics a few years ago.  He remembers being on Seroquel and Risperdal.  But he remembers Seroquel gave him nightmares and made him very tired.  He most recently got fired from his job that he has had since this past summer.  He did not know why he was fired.  Patient. was vague in some of his answers and he laughed and seemed to be responding to internal stimuli.  But pt. denied that he was hearing voices.  He admitted to smoking weed atleast 2-3 times per week.   Patient reports he will sign in voluntarily to get back on meds.  Patient does not have any friends or romantic relationships.
Patient is a 23 y/o AA male with h/o Schizophrenia, marijuana abuse, post-traumatic stress disorder, with two past psych hospitalizations, bib Aunt (legal guardian) with Aunt complaining of pt. hearing voices and being internally preoccupied.  Patient. with 1 arrest, no violence, no suicide attempts, no self injury.    Patient reports that he is in ER because he has been pacing lately, not sleeping well.  He has extra energy and he has been taking very long distance walks.  He endorses not being on psych meds for a few years.  He use to be on antipsychotics a few years ago.  He remembers being on Seroquel and Risperdal.  But he remembers Seroquel gave him nightmares and made him very tired.  He most recently got fired from his job that he has had since this past summer.  He did not know why he was fired.  Patient. was vague in some of his answers and he laughed and seemed to be responding to internal stimuli.  But pt. denied that he was hearing voices.  He admitted to smoking weed atleast 2-3 times per week.   Patient reports he will sign in voluntarily to get back on meds.  Patient does not have any friends or romantic relationships.
Patient is a 21 y/o AA male with h/o Schizophrenia, marijuana abuse, post-traumatic stress disorder, with two past psych hospitalizations, bib Aunt (legal guardian) with Aunt complaining of pt. hearing voices and being internally preoccupied.  Patient. with 1 arrest, no violence, no suicide attempts, no self injury.    Patient reports that he is in ER because he has been pacing lately, not sleeping well.  He has extra energy and he has been taking very long distance walks.  He endorses not being on psych meds for a few years.  He use to be on antipsychotics a few years ago.  He remembers being on Seroquel and Risperdal.  But he remembers Seroquel gave him nightmares and made him very tired.  He most recently got fired from his job that he has had since this past summer.  He did not know why he was fired.  Patient. was vague in some of his answers and he laughed and seemed to be responding to internal stimuli.  But pt. denied that he was hearing voices.  He admitted to smoking weed atleast 2-3 times per week.   Patient reports he will sign in voluntarily to get back on meds.  Patient does not have any friends or romantic relationships.
Patient is a 23 y/o AA male with h/o Schizophrenia, marijuana abuse, post-traumatic stress disorder, with two past psych hospitalizations, bib Aunt (legal guardian) with Aunt complaining of pt. hearing voices and being internally preoccupied.  Patient. with 1 arrest, no violence, no suicide attempts, no self injury.    Patient reports that he is in ER because he has been pacing lately, not sleeping well.  He has extra energy and he has been taking very long distance walks.  He endorses not being on psych meds for a few years.  He use to be on antipsychotics a few years ago.  He remembers being on Seroquel and Risperdal.  But he remembers Seroquel gave him nightmares and made him very tired.  He most recently got fired from his job that he has had since this past summer.  He did not know why he was fired.  Patient. was vague in some of his answers and he laughed and seemed to be responding to internal stimuli.  But pt. denied that he was hearing voices.  He admitted to smoking weed atleast 2-3 times per week.   Patient reports he will sign in voluntarily to get back on meds.  Patient does not have any friends or romantic relationships.
Patient is a 23 y/o AA male with h/o Schizophrenia, marijuana abuse, post-traumatic stress disorder, with two past psych hospitalizations, bib Aunt (legal guardian) with Aunt complaining of pt. hearing voices and being internally preoccupied.  Patient. with 1 arrest, no violence, no suicide attempts, no self injury.    Patient reports that he is in ER because he has been pacing lately, not sleeping well.  He has extra energy and he has been taking very long distance walks.  He endorses not being on psych meds for a few years.  He use to be on antipsychotics a few years ago.  He remembers being on Seroquel and Risperdal.  But he remembers Seroquel gave him nightmares and made him very tired.  He most recently got fired from his job that he has had since this past summer.  He did not know why he was fired.  Patient. was vague in some of his answers and he laughed and seemed to be responding to internal stimuli.  But pt. denied that he was hearing voices.  He admitted to smoking weed atleast 2-3 times per week.   Patient reports he will sign in voluntarily to get back on meds.  Patient does not have any friends or romantic relationships.
Patient is a 21 y/o AA male with h/o Schizophrenia, marijuana abuse, post-traumatic stress disorder, with two past psych hospitalizations, bib Aunt (legal guardian) with Aunt complaining of pt. hearing voices and being internally preoccupied.  Patient. with 1 arrest, no violence, no suicide attempts, no self injury.    Patient reports that he is in ER because he has been pacing lately, not sleeping well.  He has extra energy and he has been taking very long distance walks.  He endorses not being on psych meds for a few years.  He use to be on antipsychotics a few years ago.  He remembers being on Seroquel and Risperdal.  But he remembers Seroquel gave him nightmares and made him very tired.  He most recently got fired from his job that he has had since this past summer.  He did not know why he was fired.  Patient. was vague in some of his answers and he laughed and seemed to be responding to internal stimuli.  But pt. denied that he was hearing voices.  He admitted to smoking weed atleast 2-3 times per week.   Patient reports he will sign in voluntarily to get back on meds.  Patient does not have any friends or romantic relationships.
Patient is a 23 y/o AA male with h/o Schizophrenia, marijuana abuse, post-traumatic stress disorder, with two past psych hospitalizations, bib Aunt (legal guardian) with Aunt complaining of pt. hearing voices and being internally preoccupied.  Patient. with 1 arrest, no violence, no suicide attempts, no self injury.    Patient reports that he is in ER because he has been pacing lately, not sleeping well.  He has extra energy and he has been taking very long distance walks.  He endorses not being on psych meds for a few years.  He use to be on antipsychotics a few years ago.  He remembers being on Seroquel and Risperdal.  But he remembers Seroquel gave him nightmares and made him very tired.  He most recently got fired from his job that he has had since this past summer.  He did not know why he was fired.  Patient. was vague in some of his answers and he laughed and seemed to be responding to internal stimuli.  But pt. denied that he was hearing voices.  He admitted to smoking weed atleast 2-3 times per week.   Patient reports he will sign in voluntarily to get back on meds.  Patient does not have any friends or romantic relationships.
Patient is a 23 y/o AA male with h/o Schizophrenia, marijuana abuse, post-traumatic stress disorder, with two past psych hospitalizations, bib Aunt (legal guardian) with Aunt complaining of pt. hearing voices and being internally preoccupied.  Patient. with 1 arrest, no violence, no suicide attempts, no self injury.    Patient reports that he is in ER because he has been pacing lately, not sleeping well.  He has extra energy and he has been taking very long distance walks.  He endorses not being on psych meds for a few years.  He use to be on antipsychotics a few years ago.  He remembers being on Seroquel and Risperdal.  But he remembers Seroquel gave him nightmares and made him very tired.  He most recently got fired from his job that he has had since this past summer.  He did not know why he was fired.  Patient. was vague in some of his answers and he laughed and seemed to be responding to internal stimuli.  But pt. denied that he was hearing voices.  He admitted to smoking weed atleast 2-3 times per week.   Patient reports he will sign in voluntarily to get back on meds.  Patient does not have any friends or romantic relationships.
Patient is a 21 y/o AA male with h/o Schizophrenia, marijuana abuse, post-traumatic stress disorder, with two past psych hospitalizations, bib Aunt (legal guardian) with Aunt complaining of pt. hearing voices and being internally preoccupied.  Patient. with 1 arrest, no violence, no suicide attempts, no self injury.    Patient reports that he is in ER because he has been pacing lately, not sleeping well.  He has extra energy and he has been taking very long distance walks.  He endorses not being on psych meds for a few years.  He use to be on antipsychotics a few years ago.  He remembers being on Seroquel and Risperdal.  But he remembers Seroquel gave him nightmares and made him very tired.  He most recently got fired from his job that he has had since this past summer.  He did not know why he was fired.  Patient. was vague in some of his answers and he laughed and seemed to be responding to internal stimuli.  But pt. denied that he was hearing voices.  He admitted to smoking weed atleast 2-3 times per week.   Patient reports he will sign in voluntarily to get back on meds.  Patient does not have any friends or romantic relationships.
Patient is a 23 y/o AA male with h/o Schizophrenia, marijuana abuse, post-traumatic stress disorder, with two past psych hospitalizations, bib Aunt (legal guardian) with Aunt complaining of pt. hearing voices and being internally preoccupied.  Patient. with 1 arrest, no violence, no suicide attempts, no self injury.    Patient reports that he is in ER because he has been pacing lately, not sleeping well.  He has extra energy and he has been taking very long distance walks.  He endorses not being on psych meds for a few years.  He use to be on antipsychotics a few years ago.  He remembers being on Seroquel and Risperdal.  But he remembers Seroquel gave him nightmares and made him very tired.  He most recently got fired from his job that he has had since this past summer.  He did not know why he was fired.  Patient. was vague in some of his answers and he laughed and seemed to be responding to internal stimuli.  But pt. denied that he was hearing voices.  He admitted to smoking weed atleast 2-3 times per week.   Patient reports he will sign in voluntarily to get back on meds.  Patient does not have any friends or romantic relationships.
Patient is a 23 y/o AA male with h/o Schizophrenia, marijuana abuse, post-traumatic stress disorder, with two past psych hospitalizations, bib Aunt (legal guardian) with Aunt complaining of pt. hearing voices and being internally preoccupied.  Patient. with 1 arrest, no violence, no suicide attempts, no self injury.    Patient reports that he is in ER because he has been pacing lately, not sleeping well.  He has extra energy and he has been taking very long distance walks.  He endorses not being on psych meds for a few years.  He use to be on antipsychotics a few years ago.  He remembers being on Seroquel and Risperdal.  But he remembers Seroquel gave him nightmares and made him very tired.  He most recently got fired from his job that he has had since this past summer.  He did not know why he was fired.  Patient. was vague in some of his answers and he laughed and seemed to be responding to internal stimuli.  But pt. denied that he was hearing voices.  He admitted to smoking weed atleast 2-3 times per week.   Patient reports he will sign in voluntarily to get back on meds.  Patient does not have any friends or romantic relationships.
Patient is a 21 y/o AA male with h/o Schizophrenia, marijuana abuse, post-traumatic stress disorder, with two past psych hospitalizations, bib Aunt (legal guardian) with Aunt complaining of pt. hearing voices and being internally preoccupied.  Patient. with 1 arrest, no violence, no suicide attempts, no self injury.    Patient reports that he is in ER because he has been pacing lately, not sleeping well.  He has extra energy and he has been taking very long distance walks.  He endorses not being on psych meds for a few years.  He use to be on antipsychotics a few years ago.  He remembers being on Seroquel and Risperdal.  But he remembers Seroquel gave him nightmares and made him very tired.  He most recently got fired from his job that he has had since this past summer.  He did not know why he was fired.  Patient. was vague in some of his answers and he laughed and seemed to be responding to internal stimuli.  But pt. denied that he was hearing voices.  He admitted to smoking weed atleast 2-3 times per week.   Patient reports he will sign in voluntarily to get back on meds.  Patient does not have any friends or romantic relationships.
Patient is a 23 y/o AA male with h/o Schizophrenia, marijuana abuse, post-traumatic stress disorder, with two past psych hospitalizations, bib Aunt (legal guardian) with Aunt complaining of pt. hearing voices and being internally preoccupied.  Patient. with 1 arrest, no violence, no suicide attempts, no self injury.    Patient reports that he is in ER because he has been pacing lately, not sleeping well.  He has extra energy and he has been taking very long distance walks.  He endorses not being on psych meds for a few years.  He use to be on antipsychotics a few years ago.  He remembers being on Seroquel and Risperdal.  But he remembers Seroquel gave him nightmares and made him very tired.  He most recently got fired from his job that he has had since this past summer.  He did not know why he was fired.  Patient. was vague in some of his answers and he laughed and seemed to be responding to internal stimuli.  But pt. denied that he was hearing voices.  He admitted to smoking weed atleast 2-3 times per week.   Patient reports he will sign in voluntarily to get back on meds.  Patient does not have any friends or romantic relationships.

## 2018-02-26 NOTE — PROGRESS NOTE BEHAVIORAL HEALTH - NSBHADMITIPDSM_PSY_A_CORE
see above for Axis I, II, III

## 2018-02-26 NOTE — DISCHARGE NOTE BEHAVIORAL HEALTH - NSTOBACCOREFERRAL_GEN_A_CS
Yes Patient refused a referral for smoking cessation at time of admission and discharge/Patient declined information

## 2018-02-26 NOTE — DISCHARGE NOTE BEHAVIORAL HEALTH - NSBHDCDXVALIDYESFT_PSY_A_CORE
Schizophrenia- chronic undifferentiated type  Cannabis use disorder- severe , in controlled environment Schizophrenia- chronic undifferentiated type  Cannabis use disorder- moderate , in controlled environment  Alcohol use d/o- mild, in controlled environment  Nonadherence with medical treatment

## 2018-02-26 NOTE — DISCHARGE NOTE BEHAVIORAL HEALTH - SECONDARY DIAGNOSIS.
Cannabis use disorder, moderate, in controlled environment Alcohol use disorder, moderate, in controlled environment Nonadherence to medical treatment

## 2018-02-26 NOTE — DISCHARGE NOTE BEHAVIORAL HEALTH - NSBHDCTHERAPYFT_PSY_A_CORE
pt provided with individual and group therapies including safety planning, coping skills, focus on substance use disorder, psychoeducation as to diagnosis and treatment

## 2018-02-26 NOTE — PROGRESS NOTE BEHAVIORAL HEALTH - NSBHFUPSUICINTERVAL_PSY_A_CORE
none known
unable to assess
none known
none known
unable to assess
none known
unable to assess
none known
unable to assess
unable to assess
none known
unable to assess
none known

## 2018-02-26 NOTE — PROGRESS NOTE BEHAVIORAL HEALTH - NSBHCHARTREVIEWLAB_PSY_A_CORE FT
CBC Full  -  ( 2018 02:12 )  WBC Count : 6.6 K/uL  Hemoglobin : 15.0 g/dL  Hematocrit : 44.8 %  Platelet Count - Automated : 213 K/uL  Mean Cell Volume : 86.0 fl  Mean Cell Hemoglobin : 28.8 pg  Mean Cell Hemoglobin Concentration : 33.5 g/dL  Auto Neutrophil # : 2.3 K/uL  Auto Lymphocyte # : 3.6 K/uL  Auto Monocyte # : 0.3 K/uL  Auto Eosinophil # : 0.3 K/uL  Auto Basophil # : 0.0 K/uL  Auto Neutrophil % : 34.9 %  Auto Lymphocyte % : 55.0 %  Auto Monocyte % : 5.0 %  Auto Eosinophil % : 4.7 %  Auto Basophil % : 0.2 %    -17    139  |  100  |  16.0  ----------------------------<  94  4.0   |  26.0  |  0.76    Ca    9.3      2018 02:12        Urinalysis Basic - ( 2018 04:26 )    Color: Yellow / Appearance: Clear / S.015 / pH: x  Gluc: x / Ketone: Negative  / Bili: Negative / Urobili: Negative mg/dL   Blood: x / Protein: Negative mg/dL / Nitrite: Negative   Leuk Esterase: Negative / RBC: x / WBC x   Sq Epi: x / Non Sq Epi: x / Bacteria: x
CBC Full  -  ( 12 Feb 2018 07:18 )  WBC Count : 5.1 K/uL  Hemoglobin : 14.1 g/dL  Hematocrit : 42.6 %  Platelet Count - Automated : 180 K/uL  Mean Cell Volume : 85.4 fl  Mean Cell Hemoglobin : 28.3 pg  Mean Cell Hemoglobin Concentration : 33.2 gm/dL  Auto Neutrophil # : 2.2 K/uL  Auto Lymphocyte # : 2.1 K/uL  Auto Monocyte # : 0.4 K/uL  Auto Eosinophil # : 0.2 K/uL  Auto Basophil # : 0.1 K/uL  Auto Neutrophil % : 43.9 %  Auto Lymphocyte % : 41.7 %  Auto Monocyte % : 8.6 %  Auto Eosinophil % : 4.3 %  Auto Basophil % : 1.5 %    02-12    141  |  105  |  14  ----------------------------<  80  4.1   |  30  |  0.88    Ca    8.8      12 Feb 2018 07:18    TPro  6.4  /  Alb  3.7  /  TBili  0.4  /  DBili  x   /  AST  21  /  ALT  44  /  AlkPhos  54  02-12
CBC Full  -  ( 2018 02:12 )  WBC Count : 6.6 K/uL  Hemoglobin : 15.0 g/dL  Hematocrit : 44.8 %  Platelet Count - Automated : 213 K/uL  Mean Cell Volume : 86.0 fl  Mean Cell Hemoglobin : 28.8 pg  Mean Cell Hemoglobin Concentration : 33.5 g/dL  Auto Neutrophil # : 2.3 K/uL  Auto Lymphocyte # : 3.6 K/uL  Auto Monocyte # : 0.3 K/uL  Auto Eosinophil # : 0.3 K/uL  Auto Basophil # : 0.0 K/uL  Auto Neutrophil % : 34.9 %  Auto Lymphocyte % : 55.0 %  Auto Monocyte % : 5.0 %  Auto Eosinophil % : 4.7 %  Auto Basophil % : 0.2 %    -17    139  |  100  |  16.0  ----------------------------<  94  4.0   |  26.0  |  0.76    Ca    9.3      2018 02:12        Urinalysis Basic - ( 2018 04:26 )    Color: Yellow / Appearance: Clear / S.015 / pH: x  Gluc: x / Ketone: Negative  / Bili: Negative / Urobili: Negative mg/dL   Blood: x / Protein: Negative mg/dL / Nitrite: Negative   Leuk Esterase: Negative / RBC: x / WBC x   Sq Epi: x / Non Sq Epi: x / Bacteria: x
CBC Full  -  ( 12 Feb 2018 07:18 )  WBC Count : 5.1 K/uL  Hemoglobin : 14.1 g/dL  Hematocrit : 42.6 %  Platelet Count - Automated : 180 K/uL  Mean Cell Volume : 85.4 fl  Mean Cell Hemoglobin : 28.3 pg  Mean Cell Hemoglobin Concentration : 33.2 gm/dL  Auto Neutrophil # : 2.2 K/uL  Auto Lymphocyte # : 2.1 K/uL  Auto Monocyte # : 0.4 K/uL  Auto Eosinophil # : 0.2 K/uL  Auto Basophil # : 0.1 K/uL  Auto Neutrophil % : 43.9 %  Auto Lymphocyte % : 41.7 %  Auto Monocyte % : 8.6 %  Auto Eosinophil % : 4.3 %  Auto Basophil % : 1.5 %    02-12    141  |  105  |  14  ----------------------------<  80  4.1   |  30  |  0.88    Ca    8.8      12 Feb 2018 07:18    TPro  6.4  /  Alb  3.7  /  TBili  0.4  /  DBili  x   /  AST  21  /  ALT  44  /  AlkPhos  54  02-12
CBC Full  -  ( 2018 02:12 )  WBC Count : 6.6 K/uL  Hemoglobin : 15.0 g/dL  Hematocrit : 44.8 %  Platelet Count - Automated : 213 K/uL  Mean Cell Volume : 86.0 fl  Mean Cell Hemoglobin : 28.8 pg  Mean Cell Hemoglobin Concentration : 33.5 g/dL  Auto Neutrophil # : 2.3 K/uL  Auto Lymphocyte # : 3.6 K/uL  Auto Monocyte # : 0.3 K/uL  Auto Eosinophil # : 0.3 K/uL  Auto Basophil # : 0.0 K/uL  Auto Neutrophil % : 34.9 %  Auto Lymphocyte % : 55.0 %  Auto Monocyte % : 5.0 %  Auto Eosinophil % : 4.7 %  Auto Basophil % : 0.2 %    -17    139  |  100  |  16.0  ----------------------------<  94  4.0   |  26.0  |  0.76    Ca    9.3      2018 02:12        Urinalysis Basic - ( 2018 04:26 )    Color: Yellow / Appearance: Clear / S.015 / pH: x  Gluc: x / Ketone: Negative  / Bili: Negative / Urobili: Negative mg/dL   Blood: x / Protein: Negative mg/dL / Nitrite: Negative   Leuk Esterase: Negative / RBC: x / WBC x   Sq Epi: x / Non Sq Epi: x / Bacteria: x
CBC Full  -  ( 12 Feb 2018 07:18 )  WBC Count : 5.1 K/uL  Hemoglobin : 14.1 g/dL  Hematocrit : 42.6 %  Platelet Count - Automated : 180 K/uL  Mean Cell Volume : 85.4 fl  Mean Cell Hemoglobin : 28.3 pg  Mean Cell Hemoglobin Concentration : 33.2 gm/dL  Auto Neutrophil # : 2.2 K/uL  Auto Lymphocyte # : 2.1 K/uL  Auto Monocyte # : 0.4 K/uL  Auto Eosinophil # : 0.2 K/uL  Auto Basophil # : 0.1 K/uL  Auto Neutrophil % : 43.9 %  Auto Lymphocyte % : 41.7 %  Auto Monocyte % : 8.6 %  Auto Eosinophil % : 4.3 %  Auto Basophil % : 1.5 %    02-12    141  |  105  |  14  ----------------------------<  80  4.1   |  30  |  0.88    Ca    8.8      12 Feb 2018 07:18    TPro  6.4  /  Alb  3.7  /  TBili  0.4  /  DBili  x   /  AST  21  /  ALT  44  /  AlkPhos  54  02-12
CBC Full  -  ( 2018 02:12 )  WBC Count : 6.6 K/uL  Hemoglobin : 15.0 g/dL  Hematocrit : 44.8 %  Platelet Count - Automated : 213 K/uL  Mean Cell Volume : 86.0 fl  Mean Cell Hemoglobin : 28.8 pg  Mean Cell Hemoglobin Concentration : 33.5 g/dL  Auto Neutrophil # : 2.3 K/uL  Auto Lymphocyte # : 3.6 K/uL  Auto Monocyte # : 0.3 K/uL  Auto Eosinophil # : 0.3 K/uL  Auto Basophil # : 0.0 K/uL  Auto Neutrophil % : 34.9 %  Auto Lymphocyte % : 55.0 %  Auto Monocyte % : 5.0 %  Auto Eosinophil % : 4.7 %  Auto Basophil % : 0.2 %    -17    139  |  100  |  16.0  ----------------------------<  94  4.0   |  26.0  |  0.76    Ca    9.3      2018 02:12        Urinalysis Basic - ( 2018 04:26 )    Color: Yellow / Appearance: Clear / S.015 / pH: x  Gluc: x / Ketone: Negative  / Bili: Negative / Urobili: Negative mg/dL   Blood: x / Protein: Negative mg/dL / Nitrite: Negative   Leuk Esterase: Negative / RBC: x / WBC x   Sq Epi: x / Non Sq Epi: x / Bacteria: x
CBC Full  -  ( 12 Feb 2018 07:18 )  WBC Count : 5.1 K/uL  Hemoglobin : 14.1 g/dL  Hematocrit : 42.6 %  Platelet Count - Automated : 180 K/uL  Mean Cell Volume : 85.4 fl  Mean Cell Hemoglobin : 28.3 pg  Mean Cell Hemoglobin Concentration : 33.2 gm/dL  Auto Neutrophil # : 2.2 K/uL  Auto Lymphocyte # : 2.1 K/uL  Auto Monocyte # : 0.4 K/uL  Auto Eosinophil # : 0.2 K/uL  Auto Basophil # : 0.1 K/uL  Auto Neutrophil % : 43.9 %  Auto Lymphocyte % : 41.7 %  Auto Monocyte % : 8.6 %  Auto Eosinophil % : 4.3 %  Auto Basophil % : 1.5 %    02-12    141  |  105  |  14  ----------------------------<  80  4.1   |  30  |  0.88    Ca    8.8      12 Feb 2018 07:18    TPro  6.4  /  Alb  3.7  /  TBili  0.4  /  DBili  x   /  AST  21  /  ALT  44  /  AlkPhos  54  02-12
CBC Full  -  ( 2018 02:12 )  WBC Count : 6.6 K/uL  Hemoglobin : 15.0 g/dL  Hematocrit : 44.8 %  Platelet Count - Automated : 213 K/uL  Mean Cell Volume : 86.0 fl  Mean Cell Hemoglobin : 28.8 pg  Mean Cell Hemoglobin Concentration : 33.5 g/dL  Auto Neutrophil # : 2.3 K/uL  Auto Lymphocyte # : 3.6 K/uL  Auto Monocyte # : 0.3 K/uL  Auto Eosinophil # : 0.3 K/uL  Auto Basophil # : 0.0 K/uL  Auto Neutrophil % : 34.9 %  Auto Lymphocyte % : 55.0 %  Auto Monocyte % : 5.0 %  Auto Eosinophil % : 4.7 %  Auto Basophil % : 0.2 %    -17    139  |  100  |  16.0  ----------------------------<  94  4.0   |  26.0  |  0.76    Ca    9.3      2018 02:12        Urinalysis Basic - ( 2018 04:26 )    Color: Yellow / Appearance: Clear / S.015 / pH: x  Gluc: x / Ketone: Negative  / Bili: Negative / Urobili: Negative mg/dL   Blood: x / Protein: Negative mg/dL / Nitrite: Negative   Leuk Esterase: Negative / RBC: x / WBC x   Sq Epi: x / Non Sq Epi: x / Bacteria: x
CBC Full  -  ( 2018 02:12 )  WBC Count : 6.6 K/uL  Hemoglobin : 15.0 g/dL  Hematocrit : 44.8 %  Platelet Count - Automated : 213 K/uL  Mean Cell Volume : 86.0 fl  Mean Cell Hemoglobin : 28.8 pg  Mean Cell Hemoglobin Concentration : 33.5 g/dL  Auto Neutrophil # : 2.3 K/uL  Auto Lymphocyte # : 3.6 K/uL  Auto Monocyte # : 0.3 K/uL  Auto Eosinophil # : 0.3 K/uL  Auto Basophil # : 0.0 K/uL  Auto Neutrophil % : 34.9 %  Auto Lymphocyte % : 55.0 %  Auto Monocyte % : 5.0 %  Auto Eosinophil % : 4.7 %  Auto Basophil % : 0.2 %    -17    139  |  100  |  16.0  ----------------------------<  94  4.0   |  26.0  |  0.76    Ca    9.3      2018 02:12        Urinalysis Basic - ( 2018 04:26 )    Color: Yellow / Appearance: Clear / S.015 / pH: x  Gluc: x / Ketone: Negative  / Bili: Negative / Urobili: Negative mg/dL   Blood: x / Protein: Negative mg/dL / Nitrite: Negative   Leuk Esterase: Negative / RBC: x / WBC x   Sq Epi: x / Non Sq Epi: x / Bacteria: x
CBC Full  -  ( 12 Feb 2018 07:18 )  WBC Count : 5.1 K/uL  Hemoglobin : 14.1 g/dL  Hematocrit : 42.6 %  Platelet Count - Automated : 180 K/uL  Mean Cell Volume : 85.4 fl  Mean Cell Hemoglobin : 28.3 pg  Mean Cell Hemoglobin Concentration : 33.2 gm/dL  Auto Neutrophil # : 2.2 K/uL  Auto Lymphocyte # : 2.1 K/uL  Auto Monocyte # : 0.4 K/uL  Auto Eosinophil # : 0.2 K/uL  Auto Basophil # : 0.1 K/uL  Auto Neutrophil % : 43.9 %  Auto Lymphocyte % : 41.7 %  Auto Monocyte % : 8.6 %  Auto Eosinophil % : 4.3 %  Auto Basophil % : 1.5 %    02-12    141  |  105  |  14  ----------------------------<  80  4.1   |  30  |  0.88    Ca    8.8      12 Feb 2018 07:18    TPro  6.4  /  Alb  3.7  /  TBili  0.4  /  DBili  x   /  AST  21  /  ALT  44  /  AlkPhos  54  02-12
CBC Full  -  ( 2018 02:12 )  WBC Count : 6.6 K/uL  Hemoglobin : 15.0 g/dL  Hematocrit : 44.8 %  Platelet Count - Automated : 213 K/uL  Mean Cell Volume : 86.0 fl  Mean Cell Hemoglobin : 28.8 pg  Mean Cell Hemoglobin Concentration : 33.5 g/dL  Auto Neutrophil # : 2.3 K/uL  Auto Lymphocyte # : 3.6 K/uL  Auto Monocyte # : 0.3 K/uL  Auto Eosinophil # : 0.3 K/uL  Auto Basophil # : 0.0 K/uL  Auto Neutrophil % : 34.9 %  Auto Lymphocyte % : 55.0 %  Auto Monocyte % : 5.0 %  Auto Eosinophil % : 4.7 %  Auto Basophil % : 0.2 %    -17    139  |  100  |  16.0  ----------------------------<  94  4.0   |  26.0  |  0.76    Ca    9.3      2018 02:12        Urinalysis Basic - ( 2018 04:26 )    Color: Yellow / Appearance: Clear / S.015 / pH: x  Gluc: x / Ketone: Negative  / Bili: Negative / Urobili: Negative mg/dL   Blood: x / Protein: Negative mg/dL / Nitrite: Negative   Leuk Esterase: Negative / RBC: x / WBC x   Sq Epi: x / Non Sq Epi: x / Bacteria: x
CBC Full  -  ( 12 Feb 2018 07:18 )  WBC Count : 5.1 K/uL  Hemoglobin : 14.1 g/dL  Hematocrit : 42.6 %  Platelet Count - Automated : 180 K/uL  Mean Cell Volume : 85.4 fl  Mean Cell Hemoglobin : 28.3 pg  Mean Cell Hemoglobin Concentration : 33.2 gm/dL  Auto Neutrophil # : 2.2 K/uL  Auto Lymphocyte # : 2.1 K/uL  Auto Monocyte # : 0.4 K/uL  Auto Eosinophil # : 0.2 K/uL  Auto Basophil # : 0.1 K/uL  Auto Neutrophil % : 43.9 %  Auto Lymphocyte % : 41.7 %  Auto Monocyte % : 8.6 %  Auto Eosinophil % : 4.3 %  Auto Basophil % : 1.5 %    02-12    141  |  105  |  14  ----------------------------<  80  4.1   |  30  |  0.88    Ca    8.8      12 Feb 2018 07:18    TPro  6.4  /  Alb  3.7  /  TBili  0.4  /  DBili  x   /  AST  21  /  ALT  44  /  AlkPhos  54  02-12
CBC Full  -  ( 2018 02:12 )  WBC Count : 6.6 K/uL  Hemoglobin : 15.0 g/dL  Hematocrit : 44.8 %  Platelet Count - Automated : 213 K/uL  Mean Cell Volume : 86.0 fl  Mean Cell Hemoglobin : 28.8 pg  Mean Cell Hemoglobin Concentration : 33.5 g/dL  Auto Neutrophil # : 2.3 K/uL  Auto Lymphocyte # : 3.6 K/uL  Auto Monocyte # : 0.3 K/uL  Auto Eosinophil # : 0.3 K/uL  Auto Basophil # : 0.0 K/uL  Auto Neutrophil % : 34.9 %  Auto Lymphocyte % : 55.0 %  Auto Monocyte % : 5.0 %  Auto Eosinophil % : 4.7 %  Auto Basophil % : 0.2 %    -17    139  |  100  |  16.0  ----------------------------<  94  4.0   |  26.0  |  0.76    Ca    9.3      2018 02:12        Urinalysis Basic - ( 2018 04:26 )    Color: Yellow / Appearance: Clear / S.015 / pH: x  Gluc: x / Ketone: Negative  / Bili: Negative / Urobili: Negative mg/dL   Blood: x / Protein: Negative mg/dL / Nitrite: Negative   Leuk Esterase: Negative / RBC: x / WBC x   Sq Epi: x / Non Sq Epi: x / Bacteria: x
CBC Full  -  ( 2018 02:12 )  WBC Count : 6.6 K/uL  Hemoglobin : 15.0 g/dL  Hematocrit : 44.8 %  Platelet Count - Automated : 213 K/uL  Mean Cell Volume : 86.0 fl  Mean Cell Hemoglobin : 28.8 pg  Mean Cell Hemoglobin Concentration : 33.5 g/dL  Auto Neutrophil # : 2.3 K/uL  Auto Lymphocyte # : 3.6 K/uL  Auto Monocyte # : 0.3 K/uL  Auto Eosinophil # : 0.3 K/uL  Auto Basophil # : 0.0 K/uL  Auto Neutrophil % : 34.9 %  Auto Lymphocyte % : 55.0 %  Auto Monocyte % : 5.0 %  Auto Eosinophil % : 4.7 %  Auto Basophil % : 0.2 %    -17    139  |  100  |  16.0  ----------------------------<  94  4.0   |  26.0  |  0.76    Ca    9.3      2018 02:12        Urinalysis Basic - ( 2018 04:26 )    Color: Yellow / Appearance: Clear / S.015 / pH: x  Gluc: x / Ketone: Negative  / Bili: Negative / Urobili: Negative mg/dL   Blood: x / Protein: Negative mg/dL / Nitrite: Negative   Leuk Esterase: Negative / RBC: x / WBC x   Sq Epi: x / Non Sq Epi: x / Bacteria: x
CBC Full  -  ( 12 Feb 2018 07:18 )  WBC Count : 5.1 K/uL  Hemoglobin : 14.1 g/dL  Hematocrit : 42.6 %  Platelet Count - Automated : 180 K/uL  Mean Cell Volume : 85.4 fl  Mean Cell Hemoglobin : 28.3 pg  Mean Cell Hemoglobin Concentration : 33.2 gm/dL  Auto Neutrophil # : 2.2 K/uL  Auto Lymphocyte # : 2.1 K/uL  Auto Monocyte # : 0.4 K/uL  Auto Eosinophil # : 0.2 K/uL  Auto Basophil # : 0.1 K/uL  Auto Neutrophil % : 43.9 %  Auto Lymphocyte % : 41.7 %  Auto Monocyte % : 8.6 %  Auto Eosinophil % : 4.3 %  Auto Basophil % : 1.5 %    02-12    141  |  105  |  14  ----------------------------<  80  4.1   |  30  |  0.88    Ca    8.8      12 Feb 2018 07:18    TPro  6.4  /  Alb  3.7  /  TBili  0.4  /  DBili  x   /  AST  21  /  ALT  44  /  AlkPhos  54  02-12
CBC Full  -  ( 2018 02:12 )  WBC Count : 6.6 K/uL  Hemoglobin : 15.0 g/dL  Hematocrit : 44.8 %  Platelet Count - Automated : 213 K/uL  Mean Cell Volume : 86.0 fl  Mean Cell Hemoglobin : 28.8 pg  Mean Cell Hemoglobin Concentration : 33.5 g/dL  Auto Neutrophil # : 2.3 K/uL  Auto Lymphocyte # : 3.6 K/uL  Auto Monocyte # : 0.3 K/uL  Auto Eosinophil # : 0.3 K/uL  Auto Basophil # : 0.0 K/uL  Auto Neutrophil % : 34.9 %  Auto Lymphocyte % : 55.0 %  Auto Monocyte % : 5.0 %  Auto Eosinophil % : 4.7 %  Auto Basophil % : 0.2 %    -17    139  |  100  |  16.0  ----------------------------<  94  4.0   |  26.0  |  0.76    Ca    9.3      2018 02:12        Urinalysis Basic - ( 2018 04:26 )    Color: Yellow / Appearance: Clear / S.015 / pH: x  Gluc: x / Ketone: Negative  / Bili: Negative / Urobili: Negative mg/dL   Blood: x / Protein: Negative mg/dL / Nitrite: Negative   Leuk Esterase: Negative / RBC: x / WBC x   Sq Epi: x / Non Sq Epi: x / Bacteria: x
CBC Full  -  ( 12 Feb 2018 07:18 )  WBC Count : 5.1 K/uL  Hemoglobin : 14.1 g/dL  Hematocrit : 42.6 %  Platelet Count - Automated : 180 K/uL  Mean Cell Volume : 85.4 fl  Mean Cell Hemoglobin : 28.3 pg  Mean Cell Hemoglobin Concentration : 33.2 gm/dL  Auto Neutrophil # : 2.2 K/uL  Auto Lymphocyte # : 2.1 K/uL  Auto Monocyte # : 0.4 K/uL  Auto Eosinophil # : 0.2 K/uL  Auto Basophil # : 0.1 K/uL  Auto Neutrophil % : 43.9 %  Auto Lymphocyte % : 41.7 %  Auto Monocyte % : 8.6 %  Auto Eosinophil % : 4.3 %  Auto Basophil % : 1.5 %    02-12    141  |  105  |  14  ----------------------------<  80  4.1   |  30  |  0.88    Ca    8.8      12 Feb 2018 07:18    TPro  6.4  /  Alb  3.7  /  TBili  0.4  /  DBili  x   /  AST  21  /  ALT  44  /  AlkPhos  54  02-12

## 2018-02-26 NOTE — PROGRESS NOTE BEHAVIORAL HEALTH - ESTIMATED INTELLIGENCE
Average

## 2018-02-26 NOTE — PROGRESS NOTE BEHAVIORAL HEALTH - NS ED BHA MSE SPEECH SPONTANEITY
Increased latency
Increased latency/Other
Increased latency
Increased latency/Other
Increased latency
Other/Increased latency
Increased latency/Other
Increased latency/Other
Other/Increased latency
Other/Increased latency
Increased latency/Other
Increased latency
Increased latency/Other
Other/Increased latency
Other/Increased latency
Increased latency
Increased latency/Other
Increased latency
Increased latency
Other/Increased latency
Increased latency/Other
Other/Increased latency

## 2018-02-26 NOTE — PROGRESS NOTE BEHAVIORAL HEALTH - AXIS III
none

## 2018-02-26 NOTE — PROGRESS NOTE BEHAVIORAL HEALTH - NS ED BHA AXIS I SECONDARY4 CODE FT
Z63.9

## 2018-02-26 NOTE — DISCHARGE NOTE BEHAVIORAL HEALTH - PROVIDER TOKENS
FREE:[LAST:[tba],FIRST:[tba],PHONE:[(   [phone]    -],FAX:[(   [phone]    -],ADDRESS:[[facility] in [place], [place]  Intake appointment on Monday 3/5/18 at 10:30 am for therapy and med management]]

## 2018-02-26 NOTE — PROGRESS NOTE BEHAVIORAL HEALTH - MOOD
Anxious/Other
Anxious/Other
Other
Other/Anxious
Other/Anxious
Anxious/Other
Other/Irritable
Anxious/Other
Other/Anxious
Other/Anxious
Anxious/Other
Other/Anxious
Anxious/Other
Irritable/Other
Other
Other/Anxious
Anxious/Other
Other/Anxious
Anxious/Other
Other/Anxious
Other
Anxious/Other
Anxious/Other
Other/Anxious
Anxious/Other
Anxious/Other

## 2018-02-26 NOTE — PROGRESS NOTE BEHAVIORAL HEALTH - INSIGHT (REGARDING PSYCHIATRIC ILLNESS)
Poor

## 2018-02-26 NOTE — PROGRESS NOTE BEHAVIORAL HEALTH - PERCEPTIONS
Other/Auditory hallucinations
Auditory hallucinations/Other
Other/Auditory hallucinations
Auditory hallucinations/Other
Auditory hallucinations/Other
Other/Auditory hallucinations
Other/Auditory hallucinations
Auditory hallucinations/Other
Other/Auditory hallucinations
Auditory hallucinations/Other

## 2018-02-26 NOTE — PROGRESS NOTE BEHAVIORAL HEALTH - THOUGHT PROCESS
Thought blocking/Illogical/Impaired reasoning
Disorganized/Thought blocking/Illogical/Impaired reasoning
Other/Disorganized/Impaired reasoning/Thought blocking/Illogical
Disorganized/Thought blocking/Illogical/Other/Impaired reasoning
Other/Illogical/Impaired reasoning/Disorganized
Other/Thought blocking/Illogical/Impaired reasoning/Disorganized
Disorganized/Impaired reasoning/Other/Thought blocking/Illogical
Thought blocking/Impaired reasoning/Other/Illogical/Disorganized
Thought blocking/Disorganized/Impaired reasoning/Illogical/Other
Disorganized/Other/Thought blocking/Illogical/Impaired reasoning
Thought blocking/Illogical/Impaired reasoning
Disorganized/Impaired reasoning/Other/Illogical
Disorganized/Thought blocking/Illogical/Impaired reasoning
Thought blocking/Illogical/Impaired reasoning/Disorganized
Disorganized/Thought blocking/Other/Illogical/Impaired reasoning
Illogical/Impaired reasoning/Thought blocking/Disorganized/Other
Illogical/Impaired reasoning/Thought blocking/Disorganized/Other
Disorganized/Thought blocking/Impaired reasoning/Other/Illogical
Other/Illogical/Impaired reasoning/Thought blocking/Disorganized
Thought blocking/Illogical/Impaired reasoning
Illogical/Thought blocking/Impaired reasoning/Other/Disorganized
Illogical/Impaired reasoning/Other/Thought blocking/Disorganized
Disorganized/Thought blocking/Illogical/Impaired reasoning/Other
Thought blocking/Impaired reasoning/Illogical
Thought blocking/Illogical/Impaired reasoning
Disorganized/Thought blocking/Impaired reasoning/Illogical
Other/Thought blocking/Impaired reasoning/Disorganized/Illogical
Thought blocking/Other/Illogical/Impaired reasoning/Disorganized
Thought blocking/Impaired reasoning/Other/Illogical/Disorganized

## 2018-02-26 NOTE — PROGRESS NOTE BEHAVIORAL HEALTH - LANGUAGE
No abnormalities noted

## 2018-02-26 NOTE — DISCHARGE NOTE BEHAVIORAL HEALTH - NSBHDCADDFT_PSY_A_CORE
Treatment goals  1. Thought disorganization- goal partially achieved with medication regimen of Risperdal, M tab and mouth checks ordered to further ensure pt treatment compliance  2.nonadherence with treatment- goal met while pt in a controlled hospital setting with mouth checks. Pt remains  with impaired judgment and very limited insight into his illness and the need for consistent treatment

## 2018-02-26 NOTE — PROGRESS NOTE BEHAVIORAL HEALTH - NS ED BHA AXIS I PRIMARY CODE FT
F20.3

## 2018-02-26 NOTE — PROGRESS NOTE BEHAVIORAL HEALTH - ORIENTED TO SITUATION
Yes

## 2018-02-26 NOTE — PROGRESS NOTE BEHAVIORAL HEALTH - THOUGHT ASSOCIATIONS
Loose
Loose
Other
Loose
Other
Loose
Loose
Other
Loose
Other
Other
Normal
Other
Loose
Loose
Other

## 2018-02-26 NOTE — PROGRESS NOTE BEHAVIORAL HEALTH - MUSCLE TONE / STRENGTH
Normal muscle tone/strength

## 2018-02-26 NOTE — PROGRESS NOTE BEHAVIORAL HEALTH - GAIT / STATION
Normal gait / station

## 2018-02-26 NOTE — PROGRESS NOTE BEHAVIORAL HEALTH - NSBHLOC_PSY_A_CORE
Alert

## 2018-02-26 NOTE — DISCHARGE NOTE BEHAVIORAL HEALTH - NSBHDCLABSFT_PSY_A_CORE
Monitoring of Fasting lipids ,HgA1C q 6 months  Monitoring of vital signs including waist circumference  at each visit  EKG q 6 months  Urine drug screens random- recommended

## 2018-02-26 NOTE — PROGRESS NOTE BEHAVIORAL HEALTH - NSBHFUPINTERVALCCFT_PSY_A_CORE
" Am I going to meet my  here?"
" How are you doing? I'm doing well. And you?"     Pt continued with this circuitous greeting until writer called the pt's attention away from the greeting and on to other topics.   Family meeting scheduled for 2/21/18 at 10 am with the pt and his aunt and uncle to review pt clinical progress and to discuss disposition planning.
" I haven't moved my bowels right for months."   Pt noted with flat abdomen and no abdominal distress. Drinking fluids and encouraged to eat more fiber.   Senna and MOM, Miralax ordered.  Recent lab work 2/12/18 including CMP, CBC with diff WNL
" I still don't see why I can't leave today? Saturday? Sunday?"    Pt completely ignoring lengthy family meeting of 1 day earlier 2/21/18 with clear decision agreed to by the pt and his family as to pt DC on Monday if the pt remains clinically stable and continues to tolerate his medication.  Pt became quickly annoyed with writer and pt walked away  when pt was reminded of family meeting discussion.   Pt continues to appear internally preoccupied, at times laughing to himself and always careful to not step on the lines demarcating the tiles in the hallway.
" I'm doing alright"
" I'm doing good I think. When can I leave?"
" I'm doing very well. And yourself?"
" I'm good. And yourself? When is the last time you talked to my aunt?'  Writer had given  the pt his aunt's tel # as per pt request but he had not contacted her as yet " because I was busy and I couldn't call her."   Writer had spoken with pt's aunt earlier and she was amenable to having the pt return home with her once the pt is clinically stable .
" I'm ok."
"I am fine."
' Hello. I'm doing ok."    Ongoing mouth checks and use of Risperdal M tabs to help ensure treatment adherence and improvement in clinical status.  Pt with ongoing c/o constipation. To add Senna .
' I'm alright. "      On 1/24/18 , this writer had permission to speak with the pt's aunt Crystal Meneses in [place]  ( tel [phone]) with whom the pt has been residing x the past 2 months prior to his admission to hospital on 1/17/18. Pt's aunt , an RN, who lives with her 14 and 16 yr-old children,  described the pt as " pacing and walking all the time . He appeared to be responding to internal stimuli. Hearing voices." Writer reviewed pt h/o primarily thought d/o without  major mood component and the likelihood of the pt's d/o a schizophrenia spectrum illness.  Discussed pt med regimen of Risperdal as M tabs to enhance pt treatment compliance and to hopefully decrease pt risk of future clinical relapse. 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 He would never say why."
' I'm alright. Can I get nicotine  gum?"    ( pt with smoke h/o 3-4 cigarettes /day but with a h/o THC use daily with recent increase per ED report)      On 1/24/18 , this writer had permission to speak with the pt's aunt Crystal Meneses in [place]  ( tel [phone]) with whom the pt has been residing x the past 2 months prior to his admission to hospital on 1/17/18. Pt's aunt , an RN, who lives with her 14 and 16 yr-old children,  described the pt as " pacing and walking all the time . He appeared to be responding to internal stimuli. Hearing voices." Writer reviewed pt h/o primarily thought d/o without  major mood component and the likelihood of the pt's d/o a schizophrenia spectrum illness.  Discussed pt med regimen of Risperdal as M tabs to enhance pt treatment compliance and to hopefully decrease pt risk of future clinical relapse. 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 He would never say why."
When asked by writer how pt was doing, pt appeared puzzled, distracted and internally preoccupied with inappropriate laughter . Pt with apparent thought blocking .   Pt finally answered, " Alright"
" I'm doing ok. I just need more space . I'm bored."   When writer asked about pt's aunt's report that the pt tended to remain isolated in his room at home  and rarely went out, pt stated that he dis use to go for walks and did have a job. ( pt's aunt had reported that the pt had lost that and other jobs due to pt's reluctance to leave the house to go to work or anywhere.)    Ongoing mouth checks and use of Risperdal M tabs to help ensure treatment adherence and improvement in clinical status.  Pt with ongoing c/o constipation. To add Senna .  Pt with genital itch/ discomfort. Male RN to assess ( possible tinea cruris)  Will order antifungal powder.
Covering note  Pt with good eye contact Alert Pt is more goal directed and is able to convey his needs.
"I am fine."
' I 'm doing ok. Just bored."    Ongoing mouth checks and use of Risperdal M tabs to help ensure treatment adherence and improvement in clinical status.  Pt with ongoing c/o constipation. To add Senna .
On 2/16/18, this writer ( with pt's permission) spoke with the  pt's aunt Molly Meneses ( tel [phone]) . Plan  to schedule a family meeting  on 2/21/18 at 10 am  with pt, his aunt and her  as part of pt discharge planning and disposition aftercare treatment.
" I'm doing ok. How do you think I'm doing?"    Ongoing mouth checks and use of Risperdal M tabs to help ensure treatment adherence and improvement in clinical status.  Pt with ongoing c/o constipation. To add Senna .  Pt with genital itch/ discomfort. Male RN to assess ( possible tinea cruris)  Will order antifungal powder.
" I'm good.  Just bored."  Writer had given  the pt his aunt's tel # as per pt request but he had not contacted her as yet " because I was busy and I couldn't call her."   Writer had spoken with pt's aunt earlier and she was amenable to having the pt return home with her once the pt is clinically stable .
When asked by writer how pt was doing, pt replied, " You'd have to ask my aunt about that."   Pt with similar response to many follow up questions.
" I'm doing alright"       On 1/24/18 , this writer had permission to speak with the pt's aunt Crystal Meneses in [place]  ( tel [phone]) with whom the pt has been residing x the past 2 months prior to his admission to hospital on 1/17/18. Pt's aunt , an RN, who lives with her 14 and 16 yr-old children,  described the pt as " pacing and walking all the time . He appeared to be responding to internal stimuli. Hearing voices." Writer reviewed pt h/o primarily thought d/o without  major mood component and the likelihood of the pt's d/o a schizophrenia spectrum illness.  Discussed pt med regimen of Risperdal as M tabs to enhance pt treatment compliance and to hopefully decrease pt risk of future clinical relapse. 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 He would never say why."  Pt with c/o constipation x past few days. Plan to add standing colace bid and MOM standing q am.
" I would take the injection of Risperdal every month instead of the pills, but if it's going to hold up my discharge, then I'm not so sure.'    On 2/14/18, this writer ( with pt's permission) called pt's aunt Molly Meneses ( tel [phone]) on 2/14/18 and left message as to plan to schedule a family meeting as part of pt discharge planning and disposition aftercare treatment.
" I had the shot. Why can't I go home today?"  Pt completely ignoring lengthy family meeting of 1 day earlier 2/21/18 with clear decision agreed to by the pt and his family as to pt DC on Monday if the pt remains clinically stable and continues to tolerate his medication.  Pt became quickly annoyed with writer and pt walked away  when pt was reminded of family meeting discussion.   Pt continues to appear internally preoccupied, at times laughing to himself and always careful to not step on the lines demarcating the tiles in the hallway.
" I would take the injection of Risperdal every month instead of the pills, but if it's going to hold up my discharge, then I'm not so sure.'    On 2/15/18, this writer ( with pt's permission) spoke with the  pt's aunt Molly Meneses ( tel [phone]) on 2/14/18 and left message as to plan to schedule a family meeting as part of pt discharge planning and disposition aftercare treatment.
" Alright"
" I'm doing alright. Been doing alright. I'm perfect."      On 1/24/18 , this writer had permission to speak with the pt's aunt Crystal Meneses in [place]  ( tel [phone]) with whom the pt has been residing x the past 2 months prior to his admission to hospital on 1/17/18. Pt's aunt , an RN, who lives with her 14 and 16 yr-old children,  described the pt as " pacing and walking all the time . He appeared to be responding to internal stimuli. Hearing voices." Writer reviewed pt h/o primarily thought d/o without  major mood component and the likelihood of the pt's d/o a schizophrenia spectrum illness.  Discussed pt med regimen of Risperdal as M tabs to enhance pt treatment compliance and to hopefully decrease pt risk of future clinical relapse. 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 He would never say why."  Pt with c/o constipation x past few days. Plan to add standing colace bid and MOM standing q am.
' I 'm doing ok. "    Ongoing mouth checks and use of Risperdal M tabs to help ensure treatment adherence and improvement in clinical status.  Pt with ongoing c/o constipation. To add Senna .

## 2018-02-26 NOTE — PROGRESS NOTE BEHAVIORAL HEALTH - AFFECT QUALITY
Elevated/Irritable/Anxious/Other
Other
Other/Elevated/Irritable/Anxious
Elevated/Anxious
Anxious/Other
Irritable
Anxious/Other
Anxious/Other
Elevated/Anxious
Anxious/Other
Irritable/Anxious/Other/Elevated
Anxious/Other
Elevated/Anxious
Irritable
Other
Anxious/Other
Anxious/Other/Elevated/Irritable
Anxious/Other
Anxious/Elevated
Anxious/Other
Other
Irritable/Anxious/Other/Elevated
Anxious/Other
Anxious/Other
Anxious/Elevated
Elevated/Anxious

## 2018-02-26 NOTE — PROGRESS NOTE BEHAVIORAL HEALTH - NS ED BHA AXIS I SECONDARY3 CODE FT
Z91.19

## 2018-02-26 NOTE — DISCHARGE NOTE BEHAVIORAL HEALTH - CARE PROVIDER_API CALL
tba, tba  [facility] in [place], [place]  Intake appointment on Monday 3/5/18 at 10:30 am for therapy and med management  Phone: (   )    -  Fax: (   )    -

## 2018-02-26 NOTE — PROGRESS NOTE BEHAVIORAL HEALTH - NSBHPTASSESSDT_PSY_A_CORE
02-Feb-2018
07-Feb-2018
08-Feb-2018
12-Feb-2018
13-Feb-2018
18-Jan-2018
19-Jan-2018
20-Feb-2018
21-Feb-2018
22-Jan-2018
23-Feb-2018
24-Jan-2018
25-Jan-2018
26-Feb-2018
06-Feb-2018
26-Jan-2018 18:11
19-Jan-2018
16-Feb-2018
31-Jan-2018
05-Feb-2018
09-Feb-2018
15-Feb-2018
19-Jan-2018
30-Jan-2018
14-Feb-2018
22-Feb-2018
23-Jan-2018
29-Jan-2018
01-Feb-2018

## 2018-02-26 NOTE — PROGRESS NOTE BEHAVIORAL HEALTH - NSBHCONSORIP_PSY_A_CORE
Inpatient Admission...

## 2018-02-26 NOTE — PROGRESS NOTE BEHAVIORAL HEALTH - NSBHADMITMEDEDU_PSY_A_CORE
yes...

## 2018-02-26 NOTE — DISCHARGE NOTE BEHAVIORAL HEALTH - NSBHDCADDR1FT_A_CORE
[address] [facility], [place] [address]  Appointment with Angela [address] [place], [place]   Appointment with Angela

## 2018-02-26 NOTE — PROGRESS NOTE BEHAVIORAL HEALTH - NSBHFUPTYPE_PSY_A_CORE
Inpatient

## 2018-02-26 NOTE — DISCHARGE NOTE BEHAVIORAL HEALTH - HPI (INCLUDE ILLNESS QUALITY, SEVERITY, DURATION, TIMING, CONTEXT, MODIFYING FACTORS, ASSOCIATED SIGNS AND SYMPTOMS)
The pt is a  21 y/o single  AA male with a diagnosis of Schizophrenia and THC use disorder , with a h/o  t two past psychiatric  hospitalizations at [facility] , who was  admitted via the ED at [facility] on 1/17/18 . The pt was  brought in by his aunt with Aunt due to worsening pt thought disorganization , impaired sleep with pacing throughout the night, increased evidence of pt internal preoccupation and talking/ smiling to himself, all in the context of increased THC use and a pt h/o treatment noncompliance      Per ED Admission note of 1/17/18:             The Patient reports that he is in ER because he has been pacing lately, not sleeping well.  He has extra energy and he has been taking very long distance walks.  He endorses not being on psych meds for a few years.  He use to be on antipsychotics a few years ago.  He remembers being on Seroquel and Risperdal.  But he remembers Seroquel gave him nightmares and made him very tired.  He most recently got fired from his job that he has had since this past summer.  He did not know why he was fired.  Patient. was vague in some of his answers and he laughed and seemed to be responding to internal stimuli.  But pt. denied that he was hearing voices.  He admitted to smoking weed at least 2-3 times per week.   Patient reports he will sign in voluntarily to get back on meds.  Patient does not have any friends or romantic relationships.             The pt was admitted to inpatient psychiatry on 1/17/18 on a voluntary legal status and placed on routine observation checks while the pt was further evaluated and treatment was initiated. Reason for admission : worsening psychosis , insomnia, medication noncompliance, impaired functioning  Principal Diagnosis at Discharge: Schizophrenia- chronic undifferentiated type     Per ED Admission note of 1/17/18:             The Patient reports that he is in ER because he has been pacing lately, not sleeping well.  He has extra energy and he has been taking very long distance walks.  He endorses not being on psych meds for a few years.  He use to be on antipsychotics a few years ago.  He remembers being on Seroquel and Risperdal.  But he remembers Seroquel gave him nightmares and made him very tired.  He most recently got fired from his job that he has had since this past summer.  He did not know why he was fired.  Patient. was vague in some of his answers and he laughed and seemed to be responding to internal stimuli.  But pt. denied that he was hearing voices.  He admitted to smoking weed at least 2-3 times per week.   Patient reports he will sign in voluntarily to get back on meds.  Patient does not have any friends or romantic relationships.             The pt was admitted to inpatient psychiatry on 1/17/18 on a voluntary legal status and placed on routine observation checks while the pt was further evaluated and treatment was initiated. Reason for admission : worsening psychosis , insomnia, medication noncompliance, impaired functioning  Principal Diagnosis at Discharge: Schizophrenia- chronic undifferentiated type      The pt   is a 23 y/o  single AA male with h/o Schizophrenia, marijuana use disorder , post-traumatic stress disorder, with two prior inpatient admissions to [facility] , who was brought in by his aunt to the ED at [facility] on 1/17/18. . The pt, with a h/o entrenched treatment noncompliance, was noted by family to have become increasingly internally preoccupied, awake and pacing for much of the night, talking and laughing to himself, in the context of increased THC use. psych hospitalizations, The pt was admitted to  via the ED on 1/17/18 on a voluntary legal status for observation, evaluation and treatment of the pt's more acute and functionally impairing psychosis.     Per ED Admission note of 1/17/18:             The Patient reports that he is in ER because he has been pacing lately, not sleeping well.  He has extra energy and he has been taking very long distance walks.  He endorses not being on psych meds for a few years.  He use to be on antipsychotics a few years ago.  He remembers being on Seroquel and Risperdal.  But he remembers Seroquel gave him nightmares and made him very tired.  He most recently got fired from his job that he has had since this past summer.  He did not know why he was fired.  Patient. was vague in some of his answers and he laughed and seemed to be responding to internal stimuli.  But pt. denied that he was hearing voices.  He admitted to smoking weed at least 2-3 times per week.   Patient reports he will sign in voluntarily to get back on meds.  Patient does not have any friends or romantic relationships.             The pt was admitted to inpatient psychiatry on 1/17/18 on a voluntary legal status and placed on routine observation checks while the pt was further evaluated and treatment was initiated.

## 2018-02-26 NOTE — PROGRESS NOTE BEHAVIORAL HEALTH - NSBHADMITDANGERSELF_PSY_A_CORE
unable to care for self

## 2018-02-26 NOTE — PROGRESS NOTE BEHAVIORAL HEALTH - IMPULSE CONTROL
Normal

## 2018-02-26 NOTE — PROGRESS NOTE BEHAVIORAL HEALTH - NSBHCHARTREVIEWINVESTIGATE_PSY_A_CORE FT
EKG ( 1/17/18)  VR= 52    QT /QTc= 364 /338  Sinus bradycardia with sinus arrythmia

## 2018-02-26 NOTE — PROGRESS NOTE BEHAVIORAL HEALTH - HYGIENE
Fair

## 2018-02-26 NOTE — PROGRESS NOTE BEHAVIORAL HEALTH - PROBLEM SELECTOR PROBLEM 4
Nonadherence to medical treatment

## 2018-02-26 NOTE — PROGRESS NOTE BEHAVIORAL HEALTH - NSBHFUPMEDSE_PSY_A_CORE
None known

## 2018-02-26 NOTE — DISCHARGE NOTE BEHAVIORAL HEALTH - NSBHDCTESTSFT_PSY_A_CORE
HgAIC= 5.5  Fasting Lipids WNL  Cholesterol = 178  Triglycerides = 64  CBC with diff, CMP WNL   EKG VR = 52    NO STUDIES ARE PENDING   Sinus bradycardia with sinus arrythmia

## 2018-02-26 NOTE — PROGRESS NOTE BEHAVIORAL HEALTH - RELATEDNESS
Fair
Poor
Fair
Poor
Poor
Fair
Fair
Poor
Fair
Poor
Fair
Poor
Fair
Poor
Fair
Poor
Fair
Poor
Fair
Fair

## 2018-02-26 NOTE — PROGRESS NOTE BEHAVIORAL HEALTH - NSBHCHARTREVIEWVS_PSY_A_CORE FT
Vital Signs Last 24 Hrs  T(C): --  T(F): --  HR: --  BP: --  BP(mean): --  RR: --  SpO2: --
Vital Signs Last 24 Hrs  T(C): 37.1 (29 Jan 2018 08:26), Max: 37.1 (29 Jan 2018 08:26)  T(F): 98.8 (29 Jan 2018 08:26), Max: 98.8 (29 Jan 2018 08:26)  HR: 91 (29 Jan 2018 08:26) (91 - 91)  BP: 110/76 (29 Jan 2018 08:26) (110/76 - 110/76)  BP(mean): --  RR: 14 (29 Jan 2018 08:26) (14 - 14)  SpO2: 100% (29 Jan 2018 08:26) (100% - 100%)
Vital Signs Last 24 Hrs  T(C): 36.6 (02 Feb 2018 08:45), Max: 36.6 (02 Feb 2018 08:45)  T(F): 97.9 (02 Feb 2018 08:45), Max: 97.9 (02 Feb 2018 08:45)  HR: 71 (02 Feb 2018 08:45) (71 - 71)  BP: 124/72 (02 Feb 2018 08:45) (124/72 - 124/72)  BP(mean): --  RR: 16 (02 Feb 2018 08:45) (16 - 16)  SpO2: 100% (02 Feb 2018 08:45) (100% - 100%)
Vital Signs Last 24 Hrs  T(C): 36.6 (12 Feb 2018 07:22), Max: 36.6 (12 Feb 2018 07:22)  T(F): 97.9 (12 Feb 2018 07:22), Max: 97.9 (12 Feb 2018 07:22)  HR: 69 (12 Feb 2018 07:22) (69 - 69)  BP: 125/70 (12 Feb 2018 07:22) (125/70 - 125/70)  BP(mean): --  RR: 16 (12 Feb 2018 07:22) (16 - 16)  SpO2: 100% (12 Feb 2018 07:22) (100% - 100%)
Vital Signs Last 24 Hrs  T(C): 36.6 (22 Jan 2018 08:14), Max: 36.6 (22 Jan 2018 08:14)  T(F): 97.9 (22 Jan 2018 08:14), Max: 97.9 (22 Jan 2018 08:14)  HR: 78 (22 Jan 2018 08:14) (78 - 78)  BP: 117/61 (22 Jan 2018 08:14) (117/61 - 117/61)  BP(mean): --  RR: 14 (22 Jan 2018 08:14) (14 - 14)  SpO2: 100% (22 Jan 2018 08:14) (100% - 100%)
Vital Signs Last 24 Hrs  T(C): 36.6 (23 Feb 2018 08:57), Max: 36.6 (23 Feb 2018 08:30)  T(F): 97.8 (23 Feb 2018 08:57), Max: 97.8 (23 Feb 2018 08:30)  HR: 69 (23 Feb 2018 08:57) (69 - 69)  BP: 122/75 (23 Feb 2018 08:57) (122/75 - 122/75)  BP(mean): --  RR: 14 (23 Feb 2018 08:57) (12 - 14)  SpO2: 100% (23 Feb 2018 08:57) (100% - 100%)
Vital Signs Last 24 Hrs  T(C): 36.6 (24 Jan 2018 08:27), Max: 36.6 (24 Jan 2018 08:27)  T(F): 97.9 (24 Jan 2018 08:27), Max: 97.9 (24 Jan 2018 08:27)  HR: 77 (24 Jan 2018 08:27) (77 - 77)  BP: 118/63 (24 Jan 2018 08:27) (118/63 - 118/63)  BP(mean): --  RR: 14 (24 Jan 2018 08:27) (14 - 14)  SpO2: 100% (24 Jan 2018 08:27) (100% - 100%)
Vital Signs Last 24 Hrs  T(C): 36.6 (25 Jan 2018 08:18), Max: 36.6 (25 Jan 2018 08:18)  T(F): 97.8 (25 Jan 2018 08:18), Max: 97.8 (25 Jan 2018 08:18)  HR: 69 (25 Jan 2018 08:18) (69 - 69)  BP: 121/73 (25 Jan 2018 08:18) (121/73 - 121/73)  BP(mean): --  RR: 14 (25 Jan 2018 08:18) (14 - 14)  SpO2: 100% (25 Jan 2018 08:18) (100% - 100%)
Vital Signs Last 24 Hrs  T(C): 36.9 (07 Feb 2018 07:33), Max: 36.9 (07 Feb 2018 07:33)  T(F): 98.4 (07 Feb 2018 07:33), Max: 98.4 (07 Feb 2018 07:33)  HR: 63 (07 Feb 2018 07:33) (63 - 63)  BP: 125/79 (07 Feb 2018 07:33) (125/79 - 125/79)  BP(mean): --  RR: 16 (07 Feb 2018 07:33) (16 - 16)  SpO2: 100% (07 Feb 2018 07:33) (100% - 100%)
Vital Signs Last 24 Hrs  T(C): 36.9 (21 Feb 2018 08:20), Max: 36.9 (21 Feb 2018 08:20)  T(F): 98.4 (21 Feb 2018 08:20), Max: 98.4 (21 Feb 2018 08:20)  HR: 71 (21 Feb 2018 08:20) (71 - 71)  BP: 129/71 (21 Feb 2018 08:20) (129/71 - 129/71)  BP(mean): --  RR: 16 (21 Feb 2018 08:20) (16 - 16)  SpO2: 100% (21 Feb 2018 08:20) (100% - 100%)
Vital Signs Last 24 Hrs  T(C): 37 (21 Jan 2018 07:57), Max: 37 (21 Jan 2018 07:57)  T(F): 98.6 (21 Jan 2018 07:57), Max: 98.6 (21 Jan 2018 07:57)  HR: 93 (21 Jan 2018 07:57) (93 - 93)  BP: 104/66 (21 Jan 2018 07:57) (104/66 - 104/66)  BP(mean): --  RR: 16 (21 Jan 2018 07:57) (16 - 16)  SpO2: 100% (21 Jan 2018 07:57) (100% - 100%)
Vital Signs Last 24 Hrs  T(C): 36.5 (06 Feb 2018 08:49), Max: 36.5 (06 Feb 2018 08:49)  T(F): 97.7 (06 Feb 2018 08:49), Max: 97.7 (06 Feb 2018 08:49)  HR: 69 (06 Feb 2018 08:49) (69 - 69)  BP: 112/68 (06 Feb 2018 08:49) (112/68 - 112/68)  BP(mean): --  RR: 14 (06 Feb 2018 08:49) (14 - 14)  SpO2: 100% (06 Feb 2018 08:49) (100% - 100%)
Vital Signs Last 24 Hrs  T(C): 36.6 (20 Feb 2018 07:30), Max: 36.6 (20 Feb 2018 07:30)  T(F): 97.9 (20 Feb 2018 07:30), Max: 97.9 (20 Feb 2018 07:30)  HR: 79 (20 Feb 2018 07:30) (79 - 79)  BP: 123/68 (20 Feb 2018 07:30) (123/68 - 123/68)  BP(mean): --  RR: 16 (20 Feb 2018 07:30) (16 - 16)  SpO2: 100% (20 Feb 2018 07:30) (100% - 100%)
Vital Signs Last 24 Hrs  T(C): 36.6 (23 Jan 2018 08:12), Max: 36.6 (23 Jan 2018 08:12)  T(F): 97.8 (23 Jan 2018 08:12), Max: 97.8 (23 Jan 2018 08:12)  HR: 76 (23 Jan 2018 08:12) (76 - 76)  BP: 129/72 (23 Jan 2018 08:12) (129/72 - 129/72)  BP(mean): --  RR: 14 (23 Jan 2018 08:12) (14 - 14)  SpO2: 100% (23 Jan 2018 08:12) (100% - 100%)
Vital Signs Last 24 Hrs  T(C): 36.6 (26 Jan 2018 08:37), Max: 36.6 (26 Jan 2018 08:37)  T(F): 97.9 (26 Jan 2018 08:37), Max: 97.9 (26 Jan 2018 08:37)  HR: 81 (26 Jan 2018 08:37) (81 - 81)  BP: 118/69 (26 Jan 2018 08:37) (118/69 - 118/69)  BP(mean): --  RR: 14 (26 Jan 2018 08:37) (14 - 14)  SpO2: 100% (26 Jan 2018 08:37) (100% - 100%)
Vital Signs Last 24 Hrs  T(C): 36.6 (20 Jan 2018 08:36), Max: 36.6 (20 Jan 2018 08:36)  T(F): 97.8 (20 Jan 2018 08:36), Max: 97.8 (20 Jan 2018 08:36)  HR: 88 (20 Jan 2018 08:36) (88 - 88)  BP: 109/73 (20 Jan 2018 08:36) (109/73 - 109/73)  BP(mean): --  RR: 14 (20 Jan 2018 08:36) (14 - 14)  SpO2: 100% (20 Jan 2018 08:36) (100% - 100%)
Vital Signs Last 24 Hrs  T(C): 36.5 (31 Jan 2018 08:54), Max: 36.5 (31 Jan 2018 08:54)  T(F): 97.7 (31 Jan 2018 08:54), Max: 97.7 (31 Jan 2018 08:54)  HR: 68 (31 Jan 2018 08:54) (68 - 68)  BP: 113/57 (31 Jan 2018 08:54) (113/57 - 113/57)  BP(mean): --  RR: 14 (31 Jan 2018 08:54) (14 - 14)  SpO2: 100% (31 Jan 2018 08:54) (100% - 100%)
Vital Signs Last 24 Hrs  T(C): 36.6 (22 Feb 2018 08:42), Max: 36.6 (22 Feb 2018 08:42)  T(F): 97.9 (22 Feb 2018 08:42), Max: 97.9 (22 Feb 2018 08:42)  HR: 77 (22 Feb 2018 08:42) (77 - 77)  BP: 113/61 (22 Feb 2018 08:42) (113/61 - 113/61)  BP(mean): --  RR: 14 (22 Feb 2018 08:42) (14 - 14)  SpO2: 100% (22 Feb 2018 08:42) (100% - 100%)
Vital Signs Last 24 Hrs  T(C): 36.5 (08 Feb 2018 08:41), Max: 36.5 (08 Feb 2018 08:41)  T(F): 97.7 (08 Feb 2018 08:41), Max: 97.7 (08 Feb 2018 08:41)  HR: 67 (08 Feb 2018 08:41) (67 - 67)  BP: 119/72 (08 Feb 2018 08:41) (119/72 - 119/72)  BP(mean): --  RR: 14 (08 Feb 2018 08:41) (14 - 14)  SpO2: 100% (08 Feb 2018 08:41) (100% - 100%)
Vital Signs Last 24 Hrs  T(C): 36.6 (18 Jan 2018 08:21), Max: 36.6 (18 Jan 2018 08:21)  T(F): 97.8 (18 Jan 2018 08:21), Max: 97.8 (18 Jan 2018 08:21)  HR: 64 (18 Jan 2018 08:21) (64 - 64)  BP: 113/56 (18 Jan 2018 08:21) (113/56 - 113/56)  BP(mean): --  RR: 14 (18 Jan 2018 08:21) (14 - 14)  SpO2: 100% (18 Jan 2018 08:21) (100% - 100%)
Vital Signs Last 24 Hrs  T(C): 36.6 (19 Jan 2018 07:46), Max: 36.6 (19 Jan 2018 07:46)  T(F): 97.9 (19 Jan 2018 07:46), Max: 97.9 (19 Jan 2018 07:46)  HR: 66 (19 Jan 2018 07:46) (66 - 66)  BP: 114/74 (19 Jan 2018 07:46) (114/74 - 114/74)  BP(mean): --  RR: 16 (19 Jan 2018 07:46) (16 - 16)  SpO2: 100% (19 Jan 2018 07:46) (100% - 100%)
Vital Signs Last 24 Hrs  T(C): 36.6 (26 Feb 2018 08:10), Max: 36.6 (26 Feb 2018 08:10)  T(F): 97.9 (26 Feb 2018 08:10), Max: 97.9 (26 Feb 2018 08:10)  HR: 83 (26 Feb 2018 08:10) (83 - 83)  BP: 112/69 (26 Feb 2018 08:10) (112/69 - 112/69)  BP(mean): --  RR: 16 (26 Feb 2018 08:10) (16 - 16)  SpO2: 100% (26 Feb 2018 08:10) (100% - 100%)
Vital Signs Last 24 Hrs  T(C): 36.6 (30 Jan 2018 08:23), Max: 36.6 (30 Jan 2018 08:23)  T(F): 97.9 (30 Jan 2018 08:23), Max: 97.9 (30 Jan 2018 08:23)  HR: 72 (30 Jan 2018 08:23) (72 - 72)  BP: 114/64 (30 Jan 2018 08:23) (114/64 - 114/64)  BP(mean): --  RR: 14 (30 Jan 2018 08:23) (14 - 14)  SpO2: 100% (30 Jan 2018 08:23) (100% - 100%)
Vital Signs Last 24 Hrs  T(C): 36.6 (14 Feb 2018 08:41), Max: 36.6 (14 Feb 2018 08:41)  T(F): 97.9 (14 Feb 2018 08:41), Max: 97.9 (14 Feb 2018 08:41)  HR: 77 (14 Feb 2018 08:41) (77 - 77)  BP: 133/73 (14 Feb 2018 08:41) (133/73 - 133/73)  BP(mean): --  RR: 14 (14 Feb 2018 08:41) (14 - 14)  SpO2: 100% (14 Feb 2018 08:41) (100% - 100%)
Vital Signs Last 24 Hrs  T(C): 36.6 (01 Feb 2018 08:55), Max: 36.6 (01 Feb 2018 08:55)  T(F): 97.8 (01 Feb 2018 08:55), Max: 97.8 (01 Feb 2018 08:55)  HR: 67 (01 Feb 2018 08:55) (67 - 67)  BP: 112/69 (01 Feb 2018 08:55) (112/69 - 112/69)  BP(mean): --  RR: 14 (01 Feb 2018 08:55) (14 - 14)  SpO2: 100% (01 Feb 2018 08:55) (100% - 100%)
Vital Signs Last 24 Hrs  T(C): 36.5 (16 Feb 2018 07:20), Max: 36.5 (16 Feb 2018 07:20)  T(F): 97.7 (16 Feb 2018 07:20), Max: 97.7 (16 Feb 2018 07:20)  HR: 69 (16 Feb 2018 07:20) (69 - 69)  BP: 114/75 (16 Feb 2018 07:20) (114/75 - 114/75)  BP(mean): --  RR: 16 (16 Feb 2018 07:20) (16 - 16)  SpO2: 100% (16 Feb 2018 07:20) (100% - 100%)
Vital Signs Last 24 Hrs  T(C): 36.7 (09 Feb 2018 08:25), Max: 36.7 (09 Feb 2018 08:25)  T(F): 98.1 (09 Feb 2018 08:25), Max: 98.1 (09 Feb 2018 08:25)  HR: 76 (09 Feb 2018 08:25) (76 - 76)  BP: 131/85 (09 Feb 2018 08:25) (131/85 - 131/85)  BP(mean): --  RR: 14 (09 Feb 2018 08:25) (14 - 14)  SpO2: 100% (09 Feb 2018 08:25) (100% - 100%)
Vital Signs Last 24 Hrs  T(C): 36.6 (13 Feb 2018 08:59), Max: 36.6 (13 Feb 2018 08:59)  T(F): 97.8 (13 Feb 2018 08:59), Max: 97.8 (13 Feb 2018 08:59)  HR: 71 (13 Feb 2018 08:59) (71 - 71)  BP: 122/82 (13 Feb 2018 08:59) (122/82 - 122/82)  BP(mean): --  RR: 14 (13 Feb 2018 08:59) (14 - 14)  SpO2: 100% (13 Feb 2018 08:59) (100% - 100%)
Vital Signs Last 24 Hrs  T(C): 36.6 (15 Feb 2018 07:27), Max: 36.6 (15 Feb 2018 07:27)  T(F): 97.9 (15 Feb 2018 07:27), Max: 97.9 (15 Feb 2018 07:27)  HR: 69 (15 Feb 2018 07:27) (69 - 69)  BP: 130/83 (15 Feb 2018 07:27) (130/83 - 130/83)  BP(mean): --  RR: 16 (15 Feb 2018 07:27) (16 - 16)  SpO2: 100% (15 Feb 2018 07:27) (100% - 100%)

## 2018-02-26 NOTE — PROGRESS NOTE BEHAVIORAL HEALTH - NSBHADMITIPOBS_PSY_A_CORE
Enhanced supervision

## 2018-02-26 NOTE — PROGRESS NOTE BEHAVIORAL HEALTH - EYE CONTACT
Good/Other
Poor/Other
Fair/Other
Poor/Other
Poor/Other
Other/Fair
Good/Other
Fair/Other
Good/Other
Other/Poor
Fair/Other
Good/Other
Good/Other
Other/Fair
Other/Good
Poor/Other
Other/Good
Other/Poor
Other/Fair
Fair/Other
Good/Other
Poor/Other
Poor/Other
Fair/Other
Fair/Other
Good/Other
Poor/Other
Good/Other
Other/Fair

## 2018-02-26 NOTE — PROGRESS NOTE BEHAVIORAL HEALTH - NSBHADMITIPOBSFT_PSY_A_CORE
pt remains  thought disordered  ( with slow improvement) with limited insight and exit seeking, safety concerns as to pt reactive behavioral dyscontrol  related to current paranoid psychosis
pt remains  thought disordered  ( with slow improvement) with limited insight and exit seeking, safety concerns as to pt reactive behavioral dyscontrol  related to current paranoid psychosis
pt remains severely thought disordered with limited insight and exit seeking, safety concerns as to pt reactive behavioral dyscontrol  related to current paranoid psychosis
pt remains  thought disordered  ( with slow improvement) with limited insight and exit seeking, safety concerns as to pt reactive behavioral dyscontrol  related to current paranoid psychosis
pt remains severely thought disordered with limited insight and exit seeking, safety concerns as to pt reactive behavioral dyscontrol  related to current paranoid psychosis
pt remains quite  thought disordered with limited insight and exit seeking, safety concerns as to pt reactive behavioral dyscontrol  related to current paranoid psychosis
pt remains  thought disordered  ( with slow improvement) with limited insight and exit seeking, safety concerns as to pt reactive behavioral dyscontrol  related to current paranoid psychosis
pt remains quite  thought disordered with limited insight and exit seeking, safety concerns as to pt reactive behavioral dyscontrol  related to current paranoid psychosis
pt remains  thought disordered  ( with slow improvement) with limited insight and exit seeking, safety concerns as to pt reactive behavioral dyscontrol  related to current paranoid psychosis
pt remains quite  thought disordered with limited insight and exit seeking, safety concerns as to pt reactive behavioral dyscontrol  related to current paranoid psychosis
pt remains  thought disordered  ( with slow improvement) with limited insight and exit seeking, safety concerns as to pt reactive behavioral dyscontrol  related to current paranoid psychosis
pt remains  thought disordered  ( with slow improvement) with limited insight and exit seeking, safety concerns as to pt reactive behavioral dyscontrol  related to current paranoid psychosis
pt remains severely thought disordered with limited insight and exit seeking, safety concerns as to pt reactive behavioral dyscontrol  related to current paranoid psychosis
pt remains  thought disordered  ( with slow improvement) with limited insight and exit seeking, safety concerns as to pt reactive behavioral dyscontrol  related to current paranoid psychosis
pt remains  thought disordered  ( with slow improvement) with limited insight and exit seeking, safety concerns as to pt reactive behavioral dyscontrol  related to current paranoid psychosis
pt remains severely thought disordered with limited insight and exit seeking, safety concerns as to pt reactive behavioral dyscontrol  related to current paranoid psychosis
pt remains severely thought disordered with limited insight and exit seeking, safety concerns as to pt reactive behavioral dyscontrol  related to current paranoid psychosis
pt remains quite  thought disordered with limited insight and exit seeking, safety concerns as to pt reactive behavioral dyscontrol  related to current paranoid psychosis
pt remains quite  thought disordered with limited insight and exit seeking, safety concerns as to pt reactive behavioral dyscontrol  related to current paranoid psychosis
pt remains severely thought disordered with limited insight and exit seeking, safety concerns as to pt reactive behavioral dyscontrol  related to current paranoid psychosis
pt remains  thought disordered  ( with slow improvement) with limited insight and exit seeking, safety concerns as to pt reactive behavioral dyscontrol  related to current paranoid psychosis
pt remains quite  thought disordered with limited insight and exit seeking, safety concerns as to pt reactive behavioral dyscontrol  related to current paranoid psychosis
pt remains quite  thought disordered with limited insight and exit seeking, safety concerns as to pt reactive behavioral dyscontrol  related to current paranoid psychosis
pt remains  thought disordered  ( with slow improvement) with limited insight and exit seeking, safety concerns as to pt reactive behavioral dyscontrol  related to current paranoid psychosis
pt remains quite  thought disordered with limited insight and exit seeking, safety concerns as to pt reactive behavioral dyscontrol  related to current paranoid psychosis

## 2018-02-26 NOTE — PROGRESS NOTE BEHAVIORAL HEALTH - NSBHADMITMEDEDUDETAILS_A_CORE FT
Informed consent process begun related to restart of antipsychotic medication Risperdal (effective and well tolerated in the past)

## 2018-02-26 NOTE — PROGRESS NOTE BEHAVIORAL HEALTH - PROBLEM SELECTOR PLAN 4
1.Pt encouraged to attend therapy groups  2.Ongoing staff support and encouragement  3.MOUTH CHECKS after med administration due to paranoid psychosis and pt limited insight into his illness and the need for consistent treatment  4.Risperdal disintegrating M tabs to aid with med adherence
1.Pt encouraged to attend therapy groups  2.Ongoing staff support and encouragement
1.Pt encouraged to attend therapy groups  2.Ongoing staff support and encouragement  3.MOUTH CHECKS after med administration due to paranoid psychosis and pt limited insight into his illness and the need for consistent treatment  4.Risperdal disintegrating M tabs to aid with med adherence
1.Pt encouraged to attend therapy groups  2.Ongoing staff support and encouragement
1.Pt encouraged to attend therapy groups  2.Ongoing staff support and encouragement
1.Pt encouraged to attend therapy groups  2.Ongoing staff support and encouragement  3.MOUTH CHECKS after med administration due to paranoid psychosis and pt limited insight into his illness and the need for consistent treatment  4.Risperdal disintegrating M tabs to aid with med adherence
1.Pt encouraged to attend therapy groups  2.Ongoing staff support and encouragement  3.MOUTH CHECKS after med administration due to paranoid psychosis and pt limited insight into his illness and the need for consistent treatment  4.Risperdal disintegrating M tabs to aid with med adherence
1.Pt encouraged to attend therapy groups  2.Ongoing staff support and encouragement
1.Pt encouraged to attend therapy groups  2.Ongoing staff support and encouragement  3.MOUTH CHECKS after med administration due to paranoid psychosis and pt limited insight into his illness and the need for consistent treatment  4.Risperdal disintegrating M tabs to aid with med adherence
1.Pt encouraged to attend therapy groups  2.Ongoing staff support and encouragement
1.Pt encouraged to attend therapy groups  2.Ongoing staff support and encouragement
1.Pt encouraged to attend therapy groups  2.Ongoing staff support and encouragement  3.MOUTH CHECKS after med administration due to paranoid psychosis and pt limited insight into his illness and the need for consistent treatment  4.Risperdal disintegrating M tabs to aid with med adherence

## 2018-02-26 NOTE — PROGRESS NOTE BEHAVIORAL HEALTH - NSBHADMITIPBHPROVIDER_PSY_A_CORE
N/A

## 2018-02-26 NOTE — PROGRESS NOTE BEHAVIORAL HEALTH - AFFECT CONGRUENCE
Not congruent
Other
Not congruent

## 2018-02-26 NOTE — PROGRESS NOTE BEHAVIORAL HEALTH - AFFECT RANGE
Labile
Full
Labile
Other
Full
Other
Full
Full
Other
Full
Labile
Blunted
Blunted
Full
Other
Full
Full
Other
Full
Labile
Other
Other
Blunted
Full
Labile
Blunted
Other

## 2018-02-26 NOTE — DISCHARGE NOTE BEHAVIORAL HEALTH - PAST PSYCHIATRIC HISTORY
see ED Admission note 1/17/18 Family meeting ( 2/21/18)  	Pt seen  with his aunt and uncle, with TAI Ms Dugan and this writer in attendance to review pt clinical status and disposition planning.   	 Though the pt had continued to maintain that he never heard voices, despite staff's having noted pt talking and laughing to himself frequently during the early part of the pt's admission to hospital, his aunt and uncle also described pt habit of " smiling and laughing to himself ' maybe when he was hearing the good voices' and at other times, pt's trying to plug his ears with his fingers and c/o ear wax, " when he was hearing bad voices." Lengthy discussion as to pt h/o treatment noncompliance and to the pt's overall lack of insight into the nature and severity of his illness and the need for ongoing consistent treatment. Writer again reviewed with the pt and then with pt's family the benefits of having the pt begin q monthly Invega Sustenna in an effort to simplify the pt's med regimen with taper and DC of PO Risperdal after 1-2 months . The pt's family was in agreement with this plan and the pt himself later reconsidered the strategy with pt now in agreement to begin Invega Sustenna 234 mg IM q monthly.

## 2018-02-26 NOTE — PROGRESS NOTE BEHAVIORAL HEALTH - NS ED [facility] MED ROS HEMATOLOGIC LYMPHATIC
No complaints

## 2018-02-26 NOTE — PROGRESS NOTE BEHAVIORAL HEALTH - ESTIMATED DISCHARGE DATE
12-Feb-2018
16-Feb-2018
31-Jan-2018
20-Feb-2018
22-Feb-2018
12-Feb-2018
31-Jan-2018
05-Feb-2018
15-Feb-2018
08-Feb-2018
16-Feb-2018
08-Feb-2018
22-Feb-2018
26-Feb-2018
31-Jan-2018
26-Feb-2018
15-Feb-2018
31-Jan-2018
31-Jan-2018
05-Feb-2018
26-Feb-2018
26-Feb-2018
08-Feb-2018
08-Feb-2018
15-Feb-2018
05-Feb-2018
12-Feb-2018

## 2018-02-26 NOTE — PROGRESS NOTE BEHAVIORAL HEALTH - PRIMARY DX
Undifferentiated schizophrenia

## 2018-02-26 NOTE — PROGRESS NOTE BEHAVIORAL HEALTH - PROBLEM SELECTOR PLAN 1
1. To continue newly restarted   and  now  increased  to  Risperdal M tab to  2  mg  po q am and 4 mg po qhs in order to  alleviate ongoing impairing psychosis while decreasing  pt daytime sedation  2..Pt to attend therapy groups when better able to meaningfully participate  3. With pt consent, to gather further clinical history to aid in pt treatment and disposition planning. Pt's aunt contacted by phone on 1/24/18 with pt verbal consent  4. Disposition planning ongoing  with discussion with the pt as to Risperdal Consta or Invega Sustenna IM treatment instead of PO meds to further alleviate pt psychosis by increasing treatment adherence.  5. Pt amenable to a family meeting set for 2/21/18 at 10 am  with pt's aunt ( pt's  legal guardian) and with aunt's   as part of pt disposition planning.
1. To continue newly restarted   and  now  increased  to  Risperdal M tab to  2  mg  po q am and 4 mg po qhs in order to  alleviate ongoing impairing psychosis while decreasing  pt daytime sedation  2..Pt to attend therapy groups when better able to meaningfully participate  3. With pt consent, to gather further clinical history to aid in pt treatment and disposition planning. Pt's aunt contacted by phone on 1/24/18 with pt verbal consent  4. Disposition planning ongoing  with discussion with the pt as to Risperdal Consta or Invega Sustenna IM treatment instead of PO meds to further alleviate pt psychosis by increasing treatment adherence.  5. Pt amenable to a family meeting to be arranged with pt's aunt 9 and legal guardian)  as part of pt disposition planning.
1. To continue newly restarted and now increased  Risperdal M tab to  0.5 mg  po q am and 4 mg po qhs in order to  alleviate ongoing impairing psychosis while decreasing  pt daytime sedation  2..Pt to attend therapy groups when better able to meaningfully participate  3. With pt consent, to gather further clinical history to aid in pt treatment and disposition planning. Pt's aunt contacted by phone on 1/24/18 with pt verbal consent  4. Disposition planning ongoing
1. To continue newly restarted   and  now  increased  to  Risperdal M tab to  2  mg  po q am and 4 mg po qhs in order to  alleviate ongoing impairing psychosis while decreasing  pt daytime sedation  2..Pt to attend therapy groups when better able to meaningfully participate  3. With pt consent, to gather further clinical history to aid in pt treatment and disposition planning. Pt's aunt contacted by phone on 1/24/18 with pt verbal consent  4. Disposition planning ongoing  with discussion with the pt as to Risperdal Consta or Invega Sustenna IM treatment instead of PO meds to further alleviate pt psychosis by increasing treatment adherence.  5. Pt amenable to a family meeting set for 2/21/18 at 10 am  with pt's aunt ( pt's  legal guardian) and with aunt's   as part of pt disposition planning.
1. To continue newly restarted   and  now  increased  to  Risperdal M tab to  2  mg  po q am and 4 mg po qhs in order to  alleviate ongoing impairing psychosis while decreasing  pt daytime sedation  2..Pt to attend therapy groups when better able to meaningfully participate  3. With pt consent, to gather further clinical history to aid in pt treatment and disposition planning. Pt's aunt contacted by phone on 1/24/18 with pt verbal consent  4. Disposition planning ongoing
1. To continue newly restarted   and  now  increased  to  Risperdal M tab to  2  mg  po q am and 4 mg po qhs in order to  alleviate ongoing impairing psychosis while decreasing  pt daytime sedation  2..Pt to attend therapy groups when better able to meaningfully participate  3. With pt consent, to gather further clinical history to aid in pt treatment and disposition planning. Pt's aunt contacted by phone on 1/24/18 with pt verbal consent  4. Pt received first  Invega Sustenna 234 mg IM  on 2/22/18  treatment with taper and DC of  PO meds to further alleviate pt psychosis by increasing treatment adherence.  5. Instructive  family meeting held on  2/21/18 at 10 am  with pt's aunt  and with aunt's   as part of pt disposition planning.  6.Disposition planning to include pt attendance at [facility] 3 days a week with combined therapy and med management available.
1. To continue newly  restarted  low dose Risperdal M tab 0.5 mg po qhs ( psychosis)  2.Pt encouraged to attend therapy groups as tolerated  3.Pt gave verbal permission for writer to contact the pt's family to gather further clinical history to aid in pt treatment and disposition planning
1. To continue newly restarted and now increased  Risperdal M tab to  0.5 mg  po q am and 4 mg po qhs in order to  alleviate ongoing impairing psychosis while decreasing  pt daytime sedation  2..Pt to attend therapy groups when better able to meaningfully participate  3. With pt consent, to gather further clinical history to aid in pt treatment and disposition planning. Pt's aunt contacted by phone on 1/24/18 with pt verbal consent  4. Disposition planning ongoing
1. To increase  newly restarted  Risperdal M tab to  0.5 mg  po q am and 1 mg po qhs   2..Pt to attend therapy groups when better able to meaningfully participate  3. With pt consent, to gather further clinical history to aid in pt treatment and disposition planning  4. Disposition planning ongoing
1. To continue newly restarted   and  now  increased  to  Risperdal M tab to  2  mg  po q am and 4 mg po qhs in order to  alleviate ongoing impairing psychosis while decreasing  pt daytime sedation  2..Pt to attend therapy groups when better able to meaningfully participate  3. With pt consent, to gather further clinical history to aid in pt treatment and disposition planning. Pt's aunt contacted by phone on 1/24/18 with pt verbal consent  4. Pt now amenable to begin q monthly  Invega Sustenna 234 mg IM treatment with taper and DC of  PO meds to further alleviate pt psychosis by increasing treatment adherence.  5. Instructive  family meeting held on  2/21/18 at 10 am  with pt's aunt  and with aunt's   as part of pt disposition planning.  6.Disposition planning to include pt attendance at [facility] 3 days a week with combined therapy and med management available.
1. To increase  newly restarted  Risperdal M tab to  0.5 mg  po q am and 2 mg po qhs   2..Pt to attend therapy groups when better able to meaningfully participate  3. With pt consent, to gather further clinical history to aid in pt treatment and disposition planning  4. Disposition planning ongoing
1. To continue newly restarted   and  now  increased  to  Risperdal M tab to  1  mg  po q am and 4 mg po qhs in order to  alleviate ongoing impairing psychosis while decreasing  pt daytime sedation  2..Pt to attend therapy groups when better able to meaningfully participate  3. With pt consent, to gather further clinical history to aid in pt treatment and disposition planning. Pt's aunt contacted by phone on 1/24/18 with pt verbal consent  4. Disposition planning ongoing
1. To increase  newly restarted  Risperdal M tab to  0.5 mg  po q am and 3 mg po qhs in order to decrease pt daytime sedation  2..Pt to attend therapy groups when better able to meaningfully participate  3. With pt consent, to gather further clinical history to aid in pt treatment and disposition planning  4. Disposition planning ongoing
1. To continue newly restarted  Risperdal M tab to  0.5 mg  po q am and 3 mg po qhs in order to decrease pt daytime sedation  2..Pt to attend therapy groups when better able to meaningfully participate  3. With pt consent, to gather further clinical history to aid in pt treatment and disposition planning. Pt's aunt contacted by phone on 1/24/18 with pt verbal consent  4. Disposition planning ongoing
1. To increase  newly restarted  Risperdal M tab to  0.5 mg  po q am and 1 mg po qhs   2..Pt to attend therapy groups when better able to meaningfully participate  3. With pt consent, to gather further clinical history to aid in pt treatment and disposition planning  4. Disposition planning ongoing
1. To continue newly restarted  Risperdal M tab to  0.5 mg  po q am and 3 mg po qhs in order to decrease pt daytime sedation  2..Pt to attend therapy groups when better able to meaningfully participate  3. With pt consent, to gather further clinical history to aid in pt treatment and disposition planning. Pt's aunt contacted by phone on 1/24/18 with pt verbal consent  4. Disposition planning ongoing
1. To continue newly restarted   and  now  increased  to  Risperdal M tab to  2  mg  po q am and 4 mg po qhs in order to  alleviate ongoing impairing psychosis while decreasing  pt daytime sedation  2..Pt to attend therapy groups when better able to meaningfully participate  3. With pt consent, to gather further clinical history to aid in pt treatment and disposition planning. Pt's aunt contacted by phone on 1/24/18 with pt verbal consent  4. Disposition planning ongoing  with discussion with the pt as to Risperdal Consta or Invega Sustenna IM treatment instead of PO meds to further alleviate pt psychosis by increasing treatment adherence.
1. To continue newly restarted  Risperdal M tab to  0.5 mg  po q am and 3 mg po qhs in order to decrease pt daytime sedation  2..Pt to attend therapy groups when better able to meaningfully participate  3. With pt consent, to gather further clinical history to aid in pt treatment and disposition planning. Pt's aunt contacted by phone on 1/24/18 with pt verbal consent  4. Disposition planning ongoing
1. To continue newly restarted   and  now  increased  to  Risperdal M tab to  2  mg  po q am and 4 mg po qhs in order to  alleviate ongoing impairing psychosis while decreasing  pt daytime sedation  2..Pt to attend therapy groups when better able to meaningfully participate  3. With pt consent, to gather further clinical history to aid in pt treatment and disposition planning. Pt's aunt contacted by phone on 1/24/18 with pt verbal consent  4. Pt received first  Invega Sustenna 234 mg IM  on 2/22/18  treatment with taper and DC of  PO meds to further alleviate pt psychosis by increasing treatment adherence.  5. Instructive  family meeting held on  2/21/18 at 10 am  with pt's aunt  and with aunt's   as part of pt disposition planning.  6.Disposition planning to include pt attendance at [facility] 3 days a week with combined therapy and med management available.
1. To continue newly restarted   and  now  increased  to  Risperdal M tab to  2  mg  po q am and 4 mg po qhs in order to  alleviate ongoing impairing psychosis while decreasing  pt daytime sedation  2..Pt to attend therapy groups when better able to meaningfully participate  3. With pt consent, to gather further clinical history to aid in pt treatment and disposition planning. Pt's aunt contacted by phone on 1/24/18 with pt verbal consent  4. Disposition planning ongoing  with discussion with the pt as to Risperdal Consta or Invega Sustenna IM treatment instead of PO meds to further alleviate pt psychosis by increasing treatment adherence.
1. To continue newly restarted and now increased  Risperdal M tab to  0.5 mg  po q am and 4 mg po qhs in order to  alleviate ongoing impairing psychosis while decreasing  pt daytime sedation  2..Pt to attend therapy groups when better able to meaningfully participate  3. With pt consent, to gather further clinical history to aid in pt treatment and disposition planning. Pt's aunt contacted by phone on 1/24/18 with pt verbal consent  4. Disposition planning ongoing
1. To continue newly restarted and now increased  Risperdal M tab to  0.5 mg  po q am and 4 mg po qhs in order to  alleviate ongoing impairing psychosis while decreasing  pt daytime sedation  2..Pt to attend therapy groups when better able to meaningfully participate  3. With pt consent, to gather further clinical history to aid in pt treatment and disposition planning. Pt's aunt contacted by phone on 1/24/18 with pt verbal consent  4. Disposition planning ongoing
1. To continue newly restarted   and  now  increased  to  Risperdal M tab to  1  mg  po q am and 4 mg po qhs in order to  alleviate ongoing impairing psychosis while decreasing  pt daytime sedation  2..Pt to attend therapy groups when better able to meaningfully participate  3. With pt consent, to gather further clinical history to aid in pt treatment and disposition planning. Pt's aunt contacted by phone on 1/24/18 with pt verbal consent  4. Disposition planning ongoing
1. To continue newly restarted   and  now  increased  to  Risperdal M tab to  1  mg  po q am and 4 mg po qhs in order to  alleviate ongoing impairing psychosis while decreasing  pt daytime sedation  2..Pt to attend therapy groups when better able to meaningfully participate  3. With pt consent, to gather further clinical history to aid in pt treatment and disposition planning. Pt's aunt contacted by phone on 1/24/18 with pt verbal consent  4. Disposition planning ongoing
1. To continue newly restarted   and  now  increased  to  Risperdal M tab to  2  mg  po q am and 4 mg po qhs in order to  alleviate ongoing impairing psychosis while decreasing  pt daytime sedation  2..Pt to attend therapy groups when better able to meaningfully participate  3. With pt consent, to gather further clinical history to aid in pt treatment and disposition planning. Pt's aunt contacted by phone on 1/24/18 with pt verbal consent  4. Disposition planning ongoing  with discussion with the pt as to Risperdal Consta or Invega Sustenna IM treatment instead of PO meds to further alleviate pt psychosis by increasing treatment adherence.  5. Pt amenable to a family meeting to be arranged with pt's aunt 9 and legal guardian)  as part of pt disposition planning.
1. To continue newly restarted   and  to be increased  to  Risperdal M tab to  1  mg  po q am and 4 mg po qhs in order to  alleviate ongoing impairing psychosis while decreasing  pt daytime sedation  2..Pt to attend therapy groups when better able to meaningfully participate  3. With pt consent, to gather further clinical history to aid in pt treatment and disposition planning. Pt's aunt contacted by phone on 1/24/18 with pt verbal consent  4. Disposition planning ongoing
1. To increase  newly restarted  Risperdal M tab to  0.5 mg  po q am and 1 mg po qhs   2..Pt to attend therapy groups when better able to meaningfully participate  3. With pt consent, to gather further clinical history to aid in pt treatment and disposition planning  4. Disposition planning ongoing
1. To continue newly restarted  Risperdal M tab to  0.5 mg  po q am and 3 mg po qhs in order to decrease pt daytime sedation  2..Pt to attend therapy groups when better able to meaningfully participate  3. With pt consent, to gather further clinical history to aid in pt treatment and disposition planning. Pt's aunt contacted by phone on 1/24/18 with pt verbal consent  4. Disposition planning ongoing
1. To continue newly restarted   and  now  increased  to  Risperdal M tab to  2  mg  po q am and 4 mg po qhs in order to  alleviate ongoing impairing psychosis while decreasing  pt daytime sedation  2..Pt to attend therapy groups when better able to meaningfully participate  3. With pt consent, to gather further clinical history to aid in pt treatment and disposition planning. Pt's aunt contacted by phone on 1/24/18 with pt verbal consent  4. Pt received first  Invega Sustenna 234 mg IM  on 2/22/18  treatment with taper and DC of  PO meds to further alleviate pt psychosis by increasing treatment adherence.  5. Instructive  family meeting held on  2/21/18 at 10 am  with pt's aunt  and with aunt's   as part of pt disposition planning.  6.Disposition planning to include pt attendance at [email or web address] 3 days a week with combined therapy and med management available.  7. Pt for DC home on 2/26/18 with intake appointment at [email or web address] in [facility] for therapy and med management ( appointment 3/5/18 at 10:30 am)

## 2018-02-26 NOTE — PROGRESS NOTE BEHAVIORAL HEALTH - SECONDARY DX4
Relational problem related to a mental disorder or medical condition

## 2018-02-26 NOTE — PROGRESS NOTE BEHAVIORAL HEALTH - BEHAVIOR
Other
Cooperative/Other
Other

## 2018-02-26 NOTE — PROGRESS NOTE BEHAVIORAL HEALTH - THOUGHT CONTENT
Other/Preoccupations/Delusions
Delusions/Other/Preoccupations
Preoccupations/Delusions/Other
Preoccupations/Other/Delusions
Delusions/Other/Preoccupations
Preoccupations/Delusions/Other
Delusions/Preoccupations/Other
Preoccupations/Other/Delusions
Delusions/Preoccupations/Other
Other/Delusions/Preoccupations
Preoccupations/Delusions/Other
Delusions/Preoccupations/Other
Delusions/Preoccupations/Other
Other/Delusions/Preoccupations
Other/Delusions/Preoccupations
Delusions/Preoccupations/Other
Other/Delusions/Preoccupations
Preoccupations/Delusions/Other
Delusions/Other/Preoccupations
Delusions/Other/Preoccupations
Other/Delusions/Preoccupations
Other/Delusions/Preoccupations
Preoccupations/Other/Delusions
Delusions/Preoccupations/Other
Preoccupations/Other/Delusions
Delusions/Preoccupations/Other
Preoccupations/Delusions/Other
Delusions/Other/Preoccupations
Other/Preoccupations/Delusions

## 2018-02-26 NOTE — PROGRESS NOTE BEHAVIORAL HEALTH - SECONDARY DX2
Alcohol use disorder, mild, in controlled environment

## 2018-02-26 NOTE — DISCHARGE NOTE BEHAVIORAL HEALTH - MEDICATION SUMMARY - MEDICATIONS TO TAKE
I will START or STAY ON the medications listed below when I get home from the hospital:    risperiDONE 4 mg oral tablet, disintegrating  -- 1 tab(s) by mouth once a day (at bedtime) MDD:Risperdal M tabs 2 mg + 4 mg = 6 mg total /day  -- Indication: For Undifferentiated schizophrenia    paliperidone 234 mg/1.5 mL intramuscular suspension, extended release  -- 1 milligram(s) intramuscularly every 4 weeks   -- Check with your doctor before becoming pregnant.  May cause drowsiness or dizziness.  Obtain medical advice before taking any non-prescription drugs as some may affect the action of this medication.  This drug may impair the ability to drive or operate machinery.  Use care until you become familiar with its effects.    -- Indication: For Undifferentiated schizophrenia    risperiDONE 2 mg oral tablet, disintegrating  -- 1 tab(s) by mouth once a day MDD:2 mg + 4 mg total = 6 mg Risperdal M tabs /day  -- Indication: For Undifferentiated schizophrenia    hydrOXYzine hydrochloride 25 mg oral tablet  -- 1 tab(s) by mouth every 6 hours, As needed, Anxiety MDD:Vistarl 25 mg po q 6 hrs  PRN anxiety  -- Indication: For Anxiety    docusate sodium 100 mg oral capsule  -- 1 cap(s) by mouth 2 times a day MDD:200 mg /day  -- Indication: For Constipation    senna oral tablet  -- 2 tab(s) by mouth once a day (at bedtime) MDD:2 tabs/day  -- Indication: For Constipation    nicotine  -- Indication: For Smoking cessation

## 2018-02-26 NOTE — PROGRESS NOTE BEHAVIORAL HEALTH - NS ED [facility] REVIEW OF ED CHART AVAILABLE LABS REVIEWED
Yes
Yes
None available
Yes
None available
Yes

## 2018-02-26 NOTE — PROGRESS NOTE BEHAVIORAL HEALTH - NS ED [facility] MED ROS PSYCHIATRIC
See HPI

## 2018-02-26 NOTE — DISCHARGE NOTE BEHAVIORAL HEALTH - NSBHDCMEDSFT_PSY_A_CORE
MEDICATIONS   1.Risperdone M tabs 2 mg po q am ,  2.Risperidone M tabs  4 mg po qhs  3.Invega Sustenna 234 mg IM q 4 weeks ( first dose given 2/22/18) Next dose due on 3/22/18)  4.Vistaril 25 mg po q 6 hrs prn anxiety  5.Colace 100 mg po bid  6.Senna 2 tabs po qhs  . MEDICATIONS   THE PT SHOULD CONTINUE THE MEDICATIONS UNTIL TOLD TO STOP  1.Risperdone M tabs 2 mg po q am ,  2.Risperidone M tabs  4 mg po qhs  3.Invega Sustenna 234 mg IM q 4 weeks ( first dose given 2/22/18) Next dose due on 3/22/18)  4.Vistaril 25 mg po q 6 hrs prn anxiety  5.Colace 100 mg po bid  6.Senna 2 tabs po qhs  . MEDICATIONS   THE PT SHOULD CONTINUE THE MEDICATIONS UNTIL TOLD TO STOP BY OUTPATIENT PROVIDERS  1.Risperdone M tabs 2 mg po q am ,  2.Risperidone M tabs  4 mg po qhs  3.Invega Sustenna 234 mg IM q 4 weeks ( first dose given 2/22/18) Next dose due on 3/22/18)  4.Vistaril 25 mg po q 6 hrs prn anxiety  5.Colace 100 mg po bid  6.Senna 2 tabs po qhs  .

## 2018-02-26 NOTE — PROGRESS NOTE BEHAVIORAL HEALTH - NS ED [facility] AXIS I SECONDARY2 CODE FT
F10.10

## 2018-02-26 NOTE — PROGRESS NOTE BEHAVIORAL HEALTH - ABNORMAL MOVEMENTS
No abnormal movements

## 2018-02-26 NOTE — PROGRESS NOTE BEHAVIORAL HEALTH - NSBHLEGALSTATUS_PSY_A_CORE
9.13 (Voluntary)

## 2018-02-26 NOTE — DISCHARGE NOTE BEHAVIORAL HEALTH - NSBHDCADMRISKMITFT_PSY_A_CORE
Ongoing family support and pt participation at [facility] to gain further insight into his illness and the need for treatment

## 2018-02-26 NOTE — PROGRESS NOTE BEHAVIORAL HEALTH - PROBLEM SELECTOR PROBLEM 2
Cannabis use disorder, moderate, in controlled environment

## 2018-02-26 NOTE — DISCHARGE NOTE BEHAVIORAL HEALTH - CONDITIONS AT DISCHARGE
Patient alert, oriented x3, pleasant and cooperative.  Compliant with treatment and medication.  Nurse and  reviewed discharge plan with patient who understands, agrees and accepts plan.  Pt provided a copy of all discharge paperwork. Patient's caregiver also provided with discharge information and is in agreement.

## 2018-02-26 NOTE — PROGRESS NOTE BEHAVIORAL HEALTH - NSBHFUPINTERVALHXFT_PSY_A_CORE
1/21 Seen in his room. Alert. Denies any side effects of Risperdal. He remains disorganized and psychotic.     1/20Pt seen in his room, sleeping but arousable, does not want to engage in conversation fully, but denies any side effects of Risperdal. As per staff  Pt laughing to himself, internally preoccupied and suddenly wary and guarded when approached by staff.  Pt remains floridly psychotic with ongoing paranoid and persecutory delusions. Pt appears to be actively responding to internal stimuli. Pt remains reclusive. Pt attended therapy group but remained quiet, guarded and suspicious. Pt also sexually preoccupied and socially withdrawn, reclusive. Not able to attend therapy groups due to severity of his  mood and thought disorder. . Pt is tolerating newly begun currently low dose Risperdal 0.5 mg M tabs without side effects but with efficacy still too early to fully gauge. Pt cautiously amenable to increase of Risperdal dose to 0.5 mg M tab q am and 1 mg po qhs M tab to further alleviate severe pt psychosis . and affective instability.  Pt continues to appear reluctant to disclose much about himself. Pt with furtive eye contact , with impaired judgment and limited insight into the nature and severity of his illness and the need for ongoing consistent treatment.
Family meeting ( 2/21/18)  Pt seen  with his aunt and uncle, with TAI Ms Dugan and this writer in attendance to review pt clinical status and disposition planning.    Though the pt had continued to maintain that he never heard voices, despite staff's having noted pt talking and laughing to himself frequently during the early part of the pt's admission to hospital, his aunt and uncle also described pt habit of " smiling and laughing to himself ' maybe when he was hearing the good voices' and at other times, pt's trying to plug his ears with his fingers and c/o ear wax, " when he was hearing bad voices." Lengthy discussion as to pt h/o treatment noncompliance and to the pt's overall lack of insight into the nature and severity of his illness and the need for ongoing consistent treatment. Writer again reviewed with the pt and then with pt's family the benefits of having the pt begin q monthly Invega Sustenna in an effort to simplify the pt's med regimen with taper and DC of PO Risperdal after 1-2 months . The pt's family was in agreement with this plan and the pt himself later reconsidered the strategy with pt now in agreement to begin Invega Sustenna 234 mg IM q monthly.   . Pt remains somewhat illogical, concrete  but l less distracted and internally preoccupied ,  with  ongoing, though  slow  steady clinical improvement with continued titration of  Risperdal M tabs ( now 6 mg /day)  and mouth checks to ensure pt med adherence. Pt remains  Pt able to initiate conversation and to sit in the day room to discuss his treatment plan and disposition planning.  Pt continues isolative, reclusive  whenever possible. Pt refused all staff efforts to encourage pt  therapy group attendance .  Pt with better eye contact, polite and cooperative  . The pt continues somewhat   wary ,guarded and reclusive   with  bemused and dreamy superficial interactions with peers and staff.  overall.  Pt  with less evident and less pronounced paranoid and persecutory delusions. Pt remains reclusive and with limited interactions with peers or staff even when pt is visible on the unit.     Pt is tolerating  now increased  Risperdal dose of  2 mg M tab q am and 4 mg po qhs M tab to further alleviate severe pt psychosis . Pt , with ongoing impaired judgment and  with virtually no insight into the nature and severity of his illness and the need for ongoing consistent treatment. Pt tried to briefly attend a therapy group but was unable to remain seemingly due to the severity of his paranoid psychosis and affective lability. Pt  a bit more visible on the unit aside from meal time,  but generally engaged in solitary activities. Pt remains socially withdrawn and reclusive, 'escaping' to his room whenever possible.
Pt seen . Pt remains  thought disordered  but less distracted and internally preoccupied overall and  with   slow  steady clinical improvement with continued titration of  Risperdal M tabs ( now 6 mg /day)  and mouth checks to ensure pt med adherence. Pt now better able to initiate and to sustain a brief conversation with select peers and staff  . Pt able to initiate conversation and to sit in the day room to discuss his treatment plan and disposition planning.  Pt continues isolative, reclusive  whenever possible. Pt briefly entering a therapy group and becoming a bit better able to remain in the group aside from 1 or 2 brief 'bathroom breaks".  When pt is observed in the hallway, he continues , as above , to smile and laugh to himself and appears as if responding to internal stimuli  though he continues to deny any AH . Pt with better eye contact, polite and cooperative  . The pt remains internally preoccupied  though gradually less so. Pt continues somewhat   wary ,guarded and reclusive  overall. Pt was able to remain in an art therapy group for the first time , and to more meaningfully participate at times during the group.   Pt  with less evident and less pronounced paranoid and persecutory delusions. Pt remains reclusive despite overall increase in solitary visibility .    Monitoring weight and encouraging healthy food choices and more reasonable proportions.  ( pt with reportedly good appetite prior to hospital admission when on no meds, but likely increased related to addition of Risperdal to alleviate the pt's severe mood and thought disorder.)   Pt tolerating  now increased  Risperdal dose of  2 mg M tab q am and 4 mg po qhs M tab to further alleviate severe pt psychosis . and affective instability in an effort to decrease pt daytime sedation with bulk of medication to be given at hs..  Pt continues to appear reluctant to disclose much about himself. Pt with somewhat better eye contact  though pt continues to appear suspicious and wary of others. Pt , with ongoing impaired judgment and limited insight into the nature and severity of his illness and the need for ongoing consistent treatment. Pt tried to briefly attend a therapy group but was unable to remain seemingly due to the severity of his paranoid psychosis and affective lability. Pt  a bit more visible on the unit but generally engaged in solitary activities. Pt remains socially withdrawn and reclusive, 'escaping' to his room whenever possible.    MOUTH CHECKS in place due to pt paranoid psychosis and lack of insight into his illness and the need for treatment. Plan to discuss with the pt the possibility of pt's beginning Risperdal Consta q 2 weekly with future taper and DC of PO Risperdal in an effort to enhance pt med compliance with the hope of decreasing  pt risk of future clinical relapse. Pt is tolerating now further increased dose of Risperdal M  tab dose to 1 mg po qam and 4 mg po qhs to further alleviate pt ongoing paranoid psychosis.   Writer attempted to revisit  with pt the hospital's recommendation for pt Risperdal Consta q 2-3 weekly or Invega Sustenna 234 mg IM q 4 weekly  in an attempt to achieve better clinical stability  with overall med compliance in light of the pt's ongoing  near total lack of insight into his illness and the need for treatment. Pt willing to consider the option, but stated quite clearly, " I don't like needles. I would prefer to take the pills."
Pt seen . Pt remains  thought disordered  but less distracted and internally preoccupied overall and  with   slow  steady clinical improvement with continued titration of  Risperdal M tabs ( now 6 mg /day)  and mouth checks to ensure pt med adherence. Pt now better able to initiate and to sustain a brief conversation with select peers and staff  . Pt able to initiate conversation and to sit in the day room to discuss his treatment plan and disposition planning.  Pt continues isolative, reclusive  whenever possible. Pt briefly entering a therapy group and becoming a bit better able to remain in the group aside from 1 or 2 brief 'bathroom breaks".  When pt is observed in the hallway, he continues , as above , to smile and laugh to himself and appears as if responding to internal stimuli  though he continues to deny any AH . Pt with better eye contact, polite and cooperative  . The pt remains internally preoccupied  though gradually less so. Pt continues somewhat   wary ,guarded and reclusive  overall. Pt was able to remain in an art therapy group for the first time , and to more meaningfully participate at times during the group.   Pt  with less evident and less pronounced paranoid and persecutory delusions. Pt remains reclusive despite overall increase in solitary visibility .    Monitoring weight and encouraging healthy food choices and more reasonable proportions.  ( pt with reportedly good appetite prior to hospital admission when on no meds, but likely increased related to addition of Risperdal to alleviate the pt's severe mood and thought disorder.)   Pt tolerating  now increased  Risperdal dose of  2 mg M tab q am and 4 mg po qhs M tab to further alleviate severe pt psychosis . and affective instability in an effort to decrease pt daytime sedation with bulk of medication to be given at hs..  Pt continues to appear reluctant to disclose much about himself. Pt with somewhat better eye contact  though pt continues to appear suspicious and wary of others. Pt , with ongoing impaired judgment and limited insight into the nature and severity of his illness and the need for ongoing consistent treatment. Pt tried to briefly attend a therapy group but was unable to remain seemingly due to the severity of his paranoid psychosis and affective lability. Pt  a bit more visible on the unit but generally engaged in solitary activities. Pt remains socially withdrawn and reclusive, 'escaping' to his room whenever possible.    MOUTH CHECKS in place due to pt paranoid psychosis and lack of insight into his illness and the need for treatment. Plan to discuss with the pt the possibility of pt's beginning Risperdal Consta q 2 weekly with future taper and DC of PO Risperdal in an effort to enhance pt med compliance with the hope of decreasing  pt risk of future clinical relapse. Pt is tolerating now further increased dose of Risperdal M  tab dose to 1 mg po qam and 4 mg po qhs to further alleviate pt ongoing paranoid psychosis.   Writer attempted to revisit  with pt the hospital's recommendation for pt Risperdal Consta q 2-3 weekly or Invega Sustenna 234 mg IM q 4 weekly  in an attempt to achieve better clinical stability  with overall med compliance in light of the pt's ongoing  near total lack of insight into his illness and the need for treatment. Pt willing to consider the option, but stated quite clearly, " I don't like needles. I would prefer to take the pills."
Pt seen . Pt remains  thought disordered  but with   slow  steady clinical improvement with continued titration of  Risperdal M tabs and mouth checks to ensure pt med adherence. Pt now better able to initiate and to sustain a brief conversation with select peers and staff  . Pt with ongoing though somewhat less frequently noted smiling and laughing to himself as if responding to internal stimuli. Pt continues isolative, reclusive  whenever possible. Pt briefly entering a therapy group and becoming a bit better able to remain in the group aside from 1 or 2 brief 'bathroom breaks".  When pt is observed in the hallway, he continues , as above , to smile and laugh to himself and appears as if responding to internal stimuli  though he continues to deny any AH . Pt with better eye contact, polite and cooperative  . The pt remains internally preoccupied  though gradually less so. Pt still somewhat   wary ,guarded and reclusive  overall. Pt was able to remain in an art therapy group for the first time , and to more meaningfully participate at times during the group.   Pt  with less evident and less pronounced paranoid and persecutory delusions. Pt remains reclusive despite overall increase in solitary visibility .    Monitoring weight and encouraging healthy food choices and more reasonable proportions.  ( pt with reportedly good appetite prior to hospital admission when on no meds, but likely increased related to addition of Risperdal to alleviate the pt's severe mood and thought disorder.)   Pt tolerating  now increased  Risperdal dose of  1 mg M tab q am and 4 mg po qhs M tab to further alleviate severe pt psychosis . and affective instability in an effort to decrease pt daytime sedation with bulk of medication to be given at hs..  Pt continues to appear reluctant to disclose much about himself. Pt with somewhat better eye contact  though pt continues to appear suspicious and wary of others. Pt , with ongoing impaired judgment and limited insight into the nature and severity of his illness and the need for ongoing consistent treatment. Pt tried to briefly attend a therapy group but was unable to remain seemingly due to the severity of his paranoid psychosis and affective lability. Pt  a bit more visible on the unit but generally engaged in solitary activities. Pt remains socially withdrawn and reclusive, 'escaping' to his room whenever possible.    MOUTH CHECKS in place due to pt paranoid psychosis and lack of insight into his illness and the need for treatment. Plan to discuss with the pt the possibility of pt's beginning Risperdal Consta q 2 weekly with future taper and DC of PO Risperdal in an effort to enhance pt med compliance with the hope of decreasing  pt risk of future clinical relapse. Pt is tolerating now further increased dose of Risperdal M  tab dose to 1 mg po qam and 4 mg po qhs to further alleviate pt ongoing paranoid psychosis.   To discuss with pt the recommendation for pt Risperdal Consta q 2-3 weekly in an attempt to achieve better overall med compliance in light of the pt's ongoing  near total lack of insight into his illness and the need for treatment.
Pt seen . Pt remains  thought disordered  but with   slow  steady clinical improvement with continued titration of  Risperdal M tabs and mouth checks to ensure pt med adherence. Pt now better able to initiate and to sustain a brief conversation with select peers and staff  . Pt with ongoing though somewhat less frequently noted smiling and laughing to himself as if responding to internal stimuli. Pt continues isolative, reclusive  whenever possible. Pt briefly entering a therapy group and becoming a bit better able to remain in the group aside from 1 or 2 brief 'bathroom breaks".  When pt is observed in the hallway, he continues , as above , to smile and laugh to himself and appears as if responding to internal stimuli  though he continues to deny any AH . Pt with better eye contact, polite and cooperative  . The pt remains internally preoccupied  though gradually less so. Pt still somewhat   wary and guarded when approached by staff.  Pt  with less evident and less pronounced paranoid and persecutory delusions. Pt remains reclusive despite overall increase in solitary visibility .    Monitoring weight and encouraging healthy food choices and more reasonable proportions.  ( pt with reportedly good appetite prior to hospital admission when on no meds, but likely increased related to addition of Risperdal to alleviate the pt's severe mood and thought disorder.)   Pt tolerating  now increased  Risperdal dose of  1 mg M tab q am and 4 mg po qhs M tab to further alleviate severe pt psychosis . and affective instability in an effort to decrease pt daytime sedation with bulk of medication to be given at hs..  Pt continues to appear reluctant to disclose much about himself. Pt with somewhat better eye contact  though pt continues to appear suspicious and wary of others. Pt , with ongoing impaired judgment and limited insight into the nature and severity of his illness and the need for ongoing consistent treatment. Pt tried to briefly attend a therapy group but was unable to remain seemingly due to the severity of his paranoid psychosis and affective lability. Pt  a bit more visible on the unit but generally engaged in solitary activities. Pt remains socially withdrawn and reclusive, 'escaping' to his room whenever possible.    MOUTH CHECKS in place due to pt paranoid psychosis and lack of insight into his illness and the need for treatment. Plan to discuss with the pt the possibility of pt's beginning Risperdal Consta q 2 weekly with future taper and DC of PO Risperdal in an effort to enhance pt med compliance with the hope of decreasing  pt risk of future clinical relapse. Pt is tolerating now further increased dose of Risperdal M  tab dose to 1 mg po qam and 4 mg po qhs to further alleviate pt ongoing paranoid psychosis.   To discuss with pt the recommendation for pt Risperdal Consta q 2-3 weekly in an attempt to achieve better overall med compliance in light of the pt's ongoing  near total lack of insight into his illness and the need for treatment.
Pt seen . Pt remains  thought disordered with slow clinical improvement with Risperdal M tabs and mouth checks to ensure pt med adherence. Pt with ongoing though somewhat less frequently noted smiling and laughing to himself as if responding to internal stimuli. Pt continues isolative, reclusive  whenever possible. Pt briefly entering a therapy group, then leaving after making any form of excuse.  When pt is observed in the hallway, he continues , as above , to smile and laugh to himself and appears as if responding to internal stimuli  though he continues to deny any AH .  Pt walking up and down the corridor, noted to be trying to avoid stepping .on cracks between floor squares.  When writer asked  pt about his seeming wish to avoid stepping on lines between squares, pt denied being superstitious, pt's reiterating,, " I'm just bored." Pt more aware of  the basics of his situation though the pt remains baffled and somewhat annoyed  as to why others would think he needed to be in hospital at all. Pt with better eye contact, polite and cooperative  though perplexed and bewildered as to why he needed to be brought to hospital.  Pt  remains  psychotic , with pt 's continued  though somewhat less frequent  smiling and  laughing to himself, The pt remains internally preoccupied  though gradually less so. Pt still somewhat   wary and guarded when approached by staff.  Pt  with less evident and less pronounced paranoid and persecutory delusions. Pt remains reclusive despite overall increase in solitary visibility .  Pt with ravenous appetite  Monitoring weight and encouraging healthy food choices and more reasonable proportions.  ( pt with reportedly good appetite prior to hospital admission when on no meds, but likely increased related to addition of Risperdal to alleviate the pt's severe mood and thought disorder.)   Pt tolerating  now increased  Risperdal dose of  0.5 mg M tab q am and 4 mg po qhs M tab to further alleviate severe pt psychosis . and affective instability in an effort to decrease pt daytime sedation with bulk of medication to be given at hs..  Pt continues to appear reluctant to disclose much about himself. Pt with somewhat better eye contact  though pt continues to appear suspicious and wary of others. Pt , with ongoing impaired judgment and limited insight into the nature and severity of his illness and the need for ongoing consistent treatment. Pt tried to briefly attend a therapy group but was unable to remain seemingly due to the severity of his paranoid psychosis and affective lability. Pt  a bit more visible on the unit but generally engaged in solitary activities. Pt remains socially withdrawn and reclusive, 'escaping' to his room whenever possible.    MOUTH CHECKS in place due to pt paranoid psychosis and lack of insight into his illness and the need for treatment. Plan to discuss with the pt the possibility of pt's beginning Risperdal Consta q 2 weekly with future taper and DC of PO Risperdal in an effort to enhance pt med compliance with the hope of decreasing  pt risk of future clinical relapse.
Pt seen . Pt remains somewhat illogical, concrete  but overall less distracted and internally preoccupied ,  with   slow  steady clinical improvement with continued titration of  Risperdal M tabs ( now 6 mg /day)  and mouth checks to ensure pt med adherence. Pt remains  Pt able to initiate conversation and to sit in the day room to discuss his treatment plan and disposition planning.  Pt continues isolative, reclusive  whenever possible. Pt refused all staff efforts to encourage pt  therapy group attendance .  Pt with better eye contact, polite and cooperative  . The pt continues somewhat   wary ,guarded and reclusive   with  bemused and dreamy superficial interactions with peers and staff.  overall.  Pt  with less evident and less pronounced paranoid and persecutory delusions. Pt remains reclusive and with limited interactions with peers or staff even when pt is visible on the unit.     Pt is tolerating  now increased  Risperdal dose of  2 mg M tab q am and 4 mg po qhs M tab to further alleviate severe pt psychosis . Pt , with ongoing impaired judgment and  with virtually no insight into the nature and severity of his illness and the need for ongoing consistent treatment. Pt tried to briefly attend a therapy group but was unable to remain seemingly due to the severity of his paranoid psychosis and affective lability. Pt  a bit more visible on the unit aside from meal time,  but generally engaged in solitary activities. Pt remains socially withdrawn and reclusive, 'escaping' to his room whenever possible.    MOUTH CHECKS in place due to pt paranoid psychosis and lack of insight into his illness and the need for treatment. Plan to continue to  discuss with the pt the possibility of pt's beginning Risperdal Consta q 2 weekly  or Invega Sustenna q 4 weekly with possible future taper and DC of PO Risperdal over time.  Pt continues awake for much of the night, exercising and pacing in the corridor, with pt daytime sedation often used as an excuse to not attend therapy groups.
Pt seen. Dressed in street clothes though still with more  attention to his ADLs  . Pt noted to prefer perching on room sill near heater but reminded to sit in a chair or on his bed.  Pt  remains  psychotic , with pt 's continued  smiling and  laughing to himself, The pt remains internally preoccupied and suddenly wary and guarded when approached by staff.  Pt  with ongoing paranoid and persecutory delusions. Pt appears to be actively responding to internal stimuli. Pt remains reclusive. Pt attended therapy group but remained quiet, guarded and suspicious. Pt also sexually preoccupied and socially withdrawn, reclusive. Not able to attend therapy groups due to severity of his  mood and thought disorder. . Pt is tolerating newly begun current Risperdal  M tabs with dose of 0.5 mg po qam and 2 mg po qhs M tabs  Pt cautiously amenable to increase of Risperdal dose to 0.5 mg M tab q am and 3 mg po qhs M tab to further alleviate severe pt psychosis . and affective instability in an effort to decrease pt daytime sedation with bulk of medication to be given at hs..  Pt continues to appear reluctant to disclose much about himself. Pt with somewhat better eye contact  though pt continues to appear suspicious and wary of others. Pt , with ongoing impaired judgment and limited insight into the nature and severity of his illness and the need for ongoing consistent treatment. Pt tried to briefly attend a therapy group but was unable to remain seemingly due to the severity of his paranoid psychosis and affective lability. Pt remains socially withdrawn and reclusive, 'escaping' to his room whenever possible.    MOUTH CHECKS in place due to pt paranoid psychosis and lack of insight into his illness and the need for treatment.
Pt seen. Dressed in street clothes though still with only fair attention to his ADLs  . Pt continues smiling and  laughing to himself, internally preoccupied and suddenly wary and guarded when approached by staff.  Pt remains floridly psychotic with ongoing paranoid and persecutory delusions. Pt appears to be actively responding to internal stimuli. Pt remains reclusive. Pt attended therapy group but remained quiet, guarded and suspicious. Pt also sexually preoccupied and socially withdrawn, reclusive. Not able to attend therapy groups due to severity of his  mood and thought disorder. . Pt is tolerating newly begun current Risperdal  M tabs with dose of 0.5 mg po qam and 1 mg po qhs M tabs  Pt cautiously amenable to increase of Risperdal dose to 0.5 mg M tab q am and 1 mg po qhs M tab to further alleviate severe pt psychosis . and affective instability.  Pt continues to appear reluctant to disclose much about himself. Pt with furtive eye contact , with impaired judgment and limited insight into the nature and severity of his illness and the need for ongoing consistent treatment. Pt tried to briefly attend a therapy group but was unable to remain seemingly due to the severity of his paranoid psychosis and affective lability.
Pt seen. Dressed in street clothes though with fair  attention to his ADLs  . Pt appears slightly more aware of his situation and of others around him. Pt with better eye contact, polite and cooperative  though perplexed and bewildered as to why he needed to be brought to hospital.  Pt  remains  psychotic , with pt 's continued  though somewhat less frequent  smiling and  laughing to himself, The pt remains internally preoccupied  though slightly less  wary and guarded when approached by staff.  Pt  with ongoing paranoid and persecutory delusions. Pt appears to be actively responding to internal stimuli. Pt remains reclusive. Pt trying to attend therapy group but remains  quiet, guarded and suspicious. Pt also sexually preoccupied and socially withdrawn, reclusive. Not able to attend therapy groups due to severity of his  mood and thought disorder. . Pt is tolerating newly begun current Risperdal  M tabs with dose of 0.5 mg po qam and 2 mg po qhs M tabs  Pt cautiously amenable to increase of Risperdal dose to 0.5 mg M tab q am and 3 mg po qhs M tab to further alleviate severe pt psychosis . and affective instability in an effort to decrease pt daytime sedation with bulk of medication to be given at hs..  Pt continues to appear reluctant to disclose much about himself. Pt with somewhat better eye contact  though pt continues to appear suspicious and wary of others. Pt , with ongoing impaired judgment and limited insight into the nature and severity of his illness and the need for ongoing consistent treatment. Pt tried to briefly attend a therapy group but was unable to remain seemingly due to the severity of his paranoid psychosis and affective lability. Pt remains socially withdrawn and reclusive, 'escaping' to his room whenever possible.    MOUTH CHECKS in place due to pt paranoid psychosis and lack of insight into his illness and the need for treatment. Plan to discuss with the pt the possibility of pt's beginning Risperdal Consta q 2 weekly with future taper and DC of PO Risperdal in an effort to enhance pt med compliance with the hope of decreasing  pt risk of future clinical relapse.
Pt seen. Reports feeling 'fine' .  The pt continues isolative, reclusive  whenever possible. Pt refused all staff efforts to encourage pt  therapy group attendance . Pt at times will make a brief appearance in group, then asked to be excused to use the bathroom and not return.   Pt with better eye contact, polite and cooperative  . The pt continues   wary ,guarded and reclusive   with  bemused and dreamy superficial interactions with peers and staff.  overall.  Pt  with less evident and less pronounced paranoid and persecutory delusions. Pt remains reclusive and with limited interactions with peers or staff even when pt is visible on the unit.     Pt is tolerating  now increased  Risperdal dose of  2 mg M tab q am and 4 mg po qhs M tab to further alleviate severe pt psychosis . Pt , with ongoing impaired judgment and  with virtually no insight into the nature and severity of his illness and the need for ongoing consistent treatment. Pt tried to briefly attend a therapy group but was unable to remain seemingly due to the severity of his paranoid psychosis and affective lability. Pt  a bit more visible on the unit aside from meal time,  but generally engaged in solitary activities. Pt remains socially withdrawn and reclusive, 'escaping' to his room whenever possible.
Pt seen . Pt remains  thought disordered  but with  ongoing though slow  clinical improvement with Risperdal M tabs and mouth checks to ensure pt med adherence. Pt with ongoing though somewhat less frequently noted smiling and laughing to himself as if responding to internal stimuli. Pt continues isolative, reclusive  whenever possible. Pt briefly entering a therapy group, then leaving after making any form of excuse.  When pt is observed in the hallway, he continues , as above , to smile and laugh to himself and appears as if responding to internal stimuli  though he continues to deny any AH .  Pt walking up and down the corridor, noted to be trying to avoid stepping .on cracks between floor squares.  When writer asked  pt about his seeming wish to avoid stepping on lines between squares, pt denied being superstitious, pt's reiterating,, " I'm just bored." Pt more aware of  the basics of his situation though the pt remains baffled and somewhat annoyed  as to why others would think he needed to be in hospital at all. Pt with better eye contact, polite and cooperative  though perplexed and bewildered as to why he needed to be brought to hospital.  Pt  remains  psychotic , with pt 's continued  though somewhat less frequent  smiling and  laughing to himself, The pt remains internally preoccupied  though gradually less so. Pt still somewhat   wary and guarded when approached by staff.  Pt  with less evident and less pronounced paranoid and persecutory delusions. Pt remains reclusive despite overall increase in solitary visibility .  Pt with ravenous appetite  Monitoring weight and encouraging healthy food choices and more reasonable proportions.  ( pt with reportedly good appetite prior to hospital admission when on no meds, but likely increased related to addition of Risperdal to alleviate the pt's severe mood and thought disorder.)   Pt tolerating  now increased  Risperdal dose of  1 mg M tab q am and 4 mg po qhs M tab to further alleviate severe pt psychosis . and affective instability in an effort to decrease pt daytime sedation with bulk of medication to be given at hs..  Pt continues to appear reluctant to disclose much about himself. Pt with somewhat better eye contact  though pt continues to appear suspicious and wary of others. Pt , with ongoing impaired judgment and limited insight into the nature and severity of his illness and the need for ongoing consistent treatment. Pt tried to briefly attend a therapy group but was unable to remain seemingly due to the severity of his paranoid psychosis and affective lability. Pt  a bit more visible on the unit but generally engaged in solitary activities. Pt remains socially withdrawn and reclusive, 'escaping' to his room whenever possible.    MOUTH CHECKS in place due to pt paranoid psychosis and lack of insight into his illness and the need for treatment. Plan to discuss with the pt the possibility of pt's beginning Risperdal Consta q 2 weekly with future taper and DC of PO Risperdal in an effort to enhance pt med compliance with the hope of decreasing  pt risk of future clinical relapse. Pt is tolerating now further increased dose of Risperdal M  tab dose to 1 mg po qam and 4 mg po qhs to further alleviate pt ongoing paranoid psychosis.
Pt is still with gradual improvement as he is less preoccupied and is more in control of his behavior. He is less impulsive and can be redirected.   He is with a good appetite and reports that sleep quality has improved.  Denies any side effects from medication
Pt seen in his room, sleeping but arousable, does not want to engage in conversation fully, but denies any side effects of Risperdal. As per staff  Pt laughing to himself, internally preoccupied and suddenly wary and guarded when approached by staff.  Pt remains floridly psychotic with ongoing paranoid and persecutory delusions. Pt appears to be actively responding to internal stimuli. Pt remains reclusive. Pt attended therapy group but remained quiet, guarded and suspicious. Pt also sexually preoccupied and socially withdrawn, reclusive. Not able to attend therapy groups due to severity of his  mood and thought disorder. . Pt is tolerating newly begun currently low dose Risperdal 0.5 mg M tabs without side effects but with efficacy still too early to fully gauge. Pt cautiously amenable to increase of Risperdal dose to 0.5 mg M tab q am and 1 mg po qhs M tab to further alleviate severe pt psychosis . and affective instability.  Pt continues to appear reluctant to disclose much about himself. Pt with furtive eye contact , with impaired judgment and limited insight into the nature and severity of his illness and the need for ongoing consistent treatment.
Pt seen in his room. . In bed, isolative, reclusive  whenever possible. Pt not attending groups. When pt is observed in the hallway, he continues to smile and laugh to himself and appears as if responding to internal stimuli  though he continues to deny any AH .  Pt walking up and down the corridor, noted to be trying to avoid stepping .on cracks between floor squares.  When writer asked  pt about his seeming wish to avoid stepping on lines between squares, pt denied being superstitious, pt's reiterating,, " I'm just bored." Pt more aware of  the basics of his situation though the pt remains baffled and somewhat annoyed  as to why others would think he needed to be in hospital at all. Pt with better eye contact, polite and cooperative  though perplexed and bewildered as to why he needed to be brought to hospital.  Pt  remains  psychotic , with pt 's continued  though somewhat less frequent  smiling and  laughing to himself, The pt remains internally preoccupied  though gradually less so. Pt still somewhat   wary and guarded when approached by staff.  Pt  with less evident and less pronounced paranoid and persecutory delusions. Pt remains reclusive despite overall increase in solitary visibility . Pt trying to attend therapy group but remains  quiet, guarded and suspicious. Pt now less sexually preoccupied and socially withdrawn, reclusive. Pt slowly beginning to be able  to attend therapy groups as his thinking and his mood continue to stabilize.. Pt is tolerating newly begun current Risperdal  M tabs with dose of 0.5 mg po qam and 3 mg po qhs M tabs. Pt with ravenous appetite  ( prior to hospital admission when on no meds, but likely increased related to addition of Risperdal to alleviate the pt's severe mood and thought disorder.   Pt cautiously amenable to increase of Risperdal dose to 0.5 mg M tab q am and 4 mg po qhs M tab to further alleviate severe pt psychosis . and affective instability in an effort to decrease pt daytime sedation with bulk of medication to be given at hs..  Pt continues to appear reluctant to disclose much about himself. Pt with somewhat better eye contact  though pt continues to appear suspicious and wary of others. Pt , with ongoing impaired judgment and limited insight into the nature and severity of his illness and the need for ongoing consistent treatment. Pt tried to briefly attend a therapy group but was unable to remain seemingly due to the severity of his paranoid psychosis and affective lability. Pt remains socially withdrawn and reclusive, 'escaping' to his room whenever possible.    MOUTH CHECKS in place due to pt paranoid psychosis and lack of insight into his illness and the need for treatment. Plan to discuss with the pt the possibility of pt's beginning Risperdal Consta q 2 weekly with future taper and DC of PO Risperdal in an effort to enhance pt med compliance with the hope of decreasing  pt risk of future clinical relapse.
Pt seen . Pt remains  thought disordered with ongoing though slow  clinical improvement with Risperdal M tabs and mouth checks to ensure pt med adherence. Pt with ongoing though somewhat less frequently noted smiling and laughing to himself as if responding to internal stimuli. Pt continues isolative, reclusive  whenever possible. Pt briefly entering a therapy group, then leaving after making any form of excuse.  When pt is observed in the hallway, he continues , as above , to smile and laugh to himself and appears as if responding to internal stimuli  though he continues to deny any AH .  Pt walking up and down the corridor, noted to be trying to avoid stepping .on cracks between floor squares.  When writer asked  pt about his seeming wish to avoid stepping on lines between squares, pt denied being superstitious, pt's reiterating,, " I'm just bored." Pt more aware of  the basics of his situation though the pt remains baffled and somewhat annoyed  as to why others would think he needed to be in hospital at all. Pt with better eye contact, polite and cooperative  though perplexed and bewildered as to why he needed to be brought to hospital.  Pt  remains  psychotic , with pt 's continued  though somewhat less frequent  smiling and  laughing to himself, The pt remains internally preoccupied  though gradually less so. Pt still somewhat   wary and guarded when approached by staff.  Pt  with less evident and less pronounced paranoid and persecutory delusions. Pt remains reclusive despite overall increase in solitary visibility .  Pt with ravenous appetite  Monitoring weight and encouraging healthy food choices and more reasonable proportions.  ( pt with reportedly good appetite prior to hospital admission when on no meds, but likely increased related to addition of Risperdal to alleviate the pt's severe mood and thought disorder.)   Pt tolerating  now increased  Risperdal dose of  0.5 mg M tab q am and 4 mg po qhs M tab to further alleviate severe pt psychosis . and affective instability in an effort to decrease pt daytime sedation with bulk of medication to be given at hs..  Pt continues to appear reluctant to disclose much about himself. Pt with somewhat better eye contact  though pt continues to appear suspicious and wary of others. Pt , with ongoing impaired judgment and limited insight into the nature and severity of his illness and the need for ongoing consistent treatment. Pt tried to briefly attend a therapy group but was unable to remain seemingly due to the severity of his paranoid psychosis and affective lability. Pt  a bit more visible on the unit but generally engaged in solitary activities. Pt remains socially withdrawn and reclusive, 'escaping' to his room whenever possible.    MOUTH CHECKS in place due to pt paranoid psychosis and lack of insight into his illness and the need for treatment. Plan to discuss with the pt the possibility of pt's beginning Risperdal Consta q 2 weekly with future taper and DC of PO Risperdal in an effort to enhance pt med compliance with the hope of decreasing  pt risk of future clinical relapse. Plan to increase Risperdal M  tab dose to 1 mg po qam and 4 mg po qhs to further alleviate pt ongoing paranoid psychosis.
Pt seen . Pt remains  thought disordered  but with   slow  steady clinical improvement with continued titration of  Risperdal M tabs and mouth checks to ensure pt med adherence. Pt now better able to initiate and to sustain a brief conversation with select peers and staff  . Pt with ongoing though somewhat less frequently noted smiling and laughing to himself as if responding to internal stimuli. Pt continues isolative, reclusive  whenever possible. Pt briefly entering a therapy group and becoming a bit better able to remain in the group aside from 1 or 2 brief 'bathroom breaks".  When pt is observed in the hallway, he continues , as above , to smile and laugh to himself and appears as if responding to internal stimuli  though he continues to deny any AH . Pt with better eye contact, polite and cooperative  . The pt remains internally preoccupied  though gradually less so. Pt still somewhat   wary ,guarded and reclusive  overall. Pt was able to remain in an art therapy group for the first time , and to more meaningfully participate at times during the group.   Pt  with less evident and less pronounced paranoid and persecutory delusions. Pt remains reclusive despite overall increase in solitary visibility .    Monitoring weight and encouraging healthy food choices and more reasonable proportions.  ( pt with reportedly good appetite prior to hospital admission when on no meds, but likely increased related to addition of Risperdal to alleviate the pt's severe mood and thought disorder.)   Pt tolerating  now increased  Risperdal dose of  2 mg M tab q am and 4 mg po qhs M tab to further alleviate severe pt psychosis . and affective instability in an effort to decrease pt daytime sedation with bulk of medication to be given at hs..  Pt continues to appear reluctant to disclose much about himself. Pt with somewhat better eye contact  though pt continues to appear suspicious and wary of others. Pt , with ongoing impaired judgment and limited insight into the nature and severity of his illness and the need for ongoing consistent treatment. Pt tried to briefly attend a therapy group but was unable to remain seemingly due to the severity of his paranoid psychosis and affective lability. Pt  a bit more visible on the unit but generally engaged in solitary activities. Pt remains socially withdrawn and reclusive, 'escaping' to his room whenever possible.    MOUTH CHECKS in place due to pt paranoid psychosis and lack of insight into his illness and the need for treatment. Plan to discuss with the pt the possibility of pt's beginning Risperdal Consta q 2 weekly with future taper and DC of PO Risperdal in an effort to enhance pt med compliance with the hope of decreasing  pt risk of future clinical relapse. Pt is tolerating now further increased dose of Risperdal M  tab dose to 1 mg po qam and 4 mg po qhs to further alleviate pt ongoing paranoid psychosis.   To discuss with pt the recommendation for pt Risperdal Consta q 2-3 weekly in an attempt to achieve better overall med compliance in light of the pt's ongoing  near total lack of insight into his illness and the need for treatment.
Pt seen in his room. Pt remains floridly psychotic with ongoing paranoid and persecutory delusions. Pt appears to be actively responding to internal stimuli. Pt remains reclusive. Pt attended therapy group but remained quiet, guarded and suspicious. Pt also sexually preoccupied and socially withdrawn. Pt is tolerating newly begun currently low dose Risperdal 0.5 mg M tabs without side effects but with efficacy still too early to fully gauge. Pt appears reluctant to disclose much about himself. Pt with furtive eye contact , with impaired judgment and limited insight into the nature and severity of his illness and the need for ongoing consistent treatment.
Pt seen in his room. and in the hallway. Pt laughing to himself, internally preoccupied and suddenly wary and guarded when approached by staff.  Pt remains floridly psychotic with ongoing paranoid and persecutory delusions. Pt appears to be actively responding to internal stimuli. Pt remains reclusive. Pt attended therapy group but remained quiet, guarded and suspicious. Pt also sexually preoccupied and socially withdrawn, reclusive. Not able to attend therapy groups due to severity of his  mood and thought disorder. . Pt is tolerating newly begun currently low dose Risperdal 0.5 mg M tabs without side effects but with efficacy still too early to fully gauge. Pt cautiously amenable to increase of Risperdal dose to 0.5 mg M tab q am and 1 mg po qhs M tab to further alleviate severe pt psychosis . and affective instability.  Pt continues to appear reluctant to disclose much about himself. Pt with furtive eye contact , with impaired judgment and limited insight into the nature and severity of his illness and the need for ongoing consistent treatment.
Pt seen.  prior to his discharge home on 2/26/18.  Reports feeling 'fine' .  The pt continues isolative, reclusive  whenever possible. Pt refused all staff efforts to encourage pt  therapy group attendance . Pt at times will make a brief appearance in group, then asked to be excused to use the bathroom and not return.   Pt with better eye contact, polite and cooperative  . The pt continues   wary ,guarded and reclusive   with  bemused and dreamy superficial interactions with peers and staff.  overall.  Pt  with less evident and less pronounced paranoid and persecutory delusions. Pt remains reclusive and with limited interactions with peers or staff even when pt is visible on the unit.     Pt is tolerating  now increased  Risperdal dose of  2 mg M tab q am and 4 mg po qhs M tab to further alleviate severe pt psychosis . Pt , with ongoing impaired judgment and  with virtually no insight into the nature and severity of his illness and the need for ongoing consistent treatment. Pt tried to briefly attend a therapy group but was unable to remain seemingly due to the severity of his paranoid psychosis and affective lability. Pt  a bit more visible on the unit aside from meal time,  but generally engaged in solitary activities. Pt remains socially withdrawn and reclusive, 'escaping' to his room whenever possible. Pt continues to deny SI /HI AH /VH though the pt is still noted by staff to be smiling and laughing to himself as if in response to internal stimuli.  Pt amenable to outpatient treatment plan to [place]  [facility] [place] [facility] [place].
Pt seen in his room. . In bed, isolative, reclusive  whenever possible. Pt not attending groups. When pt is observed in the hallway, he continues to smile and laugh to himself and appears as if responding to internal stimuli  though he continues to deny any AH .  Pt walking up and down the corridor, noted to be trying to avoid stepping .on cracks between floor squares.  Pt more aware of  the basics of his situation though the pt remains mystified as to why others would think he needed to be in hospital at all. Pt with better eye contact, polite and cooperative  though perplexed and bewildered as to why he needed to be brought to hospital.  Pt  remains  psychotic , with pt 's continued  though somewhat less frequent  smiling and  laughing to himself, The pt remains internally preoccupied  though gradually less so. Pt still somewhat   wary and guarded when approached by staff.  Pt  with less evident and less pronounced paranoid and persecutory delusions. Pt remains reclusive despite overall increase in solitary visibility . Pt trying to attend therapy group but remains  quiet, guarded and suspicious. Pt now less sexually preoccupied and socially withdrawn, reclusive. Pt slowly beginning to be able  to attend therapy groups as his thinking and his mood continue to stabilize.. Pt is tolerating newly begun current Risperdal  M tabs with dose of 0.5 mg po qam and 3 mg po qhs M tabs  Pt cautiously amenable to increase of Risperdal dose to 0.5 mg M tab q am and 4 mg po qhs M tab to further alleviate severe pt psychosis . and affective instability in an effort to decrease pt daytime sedation with bulk of medication to be given at hs..  Pt continues to appear reluctant to disclose much about himself. Pt with somewhat better eye contact  though pt continues to appear suspicious and wary of others. Pt , with ongoing impaired judgment and limited insight into the nature and severity of his illness and the need for ongoing consistent treatment. Pt tried to briefly attend a therapy group but was unable to remain seemingly due to the severity of his paranoid psychosis and affective lability. Pt remains socially withdrawn and reclusive, 'escaping' to his room whenever possible.    MOUTH CHECKS in place due to pt paranoid psychosis and lack of insight into his illness and the need for treatment. Plan to discuss with the pt the possibility of pt's beginning Risperdal Consta q 2 weekly with future taper and DC of PO Risperdal in an effort to enhance pt med compliance with the hope of decreasing  pt risk of future clinical relapse.
Pt seen . Pt remains  thought disordered  but less distracted and internally preoccupied overall and  with   slow  steady clinical improvement with continued titration of  Risperdal M tabs ( now 6 mg /day)  and mouth checks to ensure pt med adherence. Pt now better able to initiate and to sustain a brief conversation with select peers and staff  . Pt able to initiate conversation and to sit in the day room to discuss his treatment plan and disposition planning.  Pt continues isolative, reclusive  whenever possible. Pt refused all staff efforts to encourage pt  therapy group attendance .  Pt with better eye contact, polite and cooperative  . The pt remains internally preoccupied  though notably  less so. Pt continues somewhat   wary ,guarded and reclusive  overall.  Pt  with less evident and less pronounced paranoid and persecutory delusions. Pt remains reclusive and with limited interactions with peers or staff even when pt is visible on the unit.     Pt is tolerating  now increased  Risperdal dose of  2 mg M tab q am and 4 mg po qhs M tab to further alleviate severe pt psychosis . Pt , with ongoing impaired judgment and  with virtually no insight into the nature and severity of his illness and the need for ongoing consistent treatment. Pt tried to briefly attend a therapy group but was unable to remain seemingly due to the severity of his paranoid psychosis and affective lability. Pt  a bit more visible on the unit aside from meal time,  but generally engaged in solitary activities. Pt remains socially withdrawn and reclusive, 'escaping' to his room whenever possible.    MOUTH CHECKS in place due to pt paranoid psychosis and lack of insight into his illness and the need for treatment. Plan to continue to  discuss with the pt the possibility of pt's beginning Risperdal Consta q 2 weekly  or Invega Sustenna q 4 weekly with possible future taper and DC of PO Risperdal over time.
Pt seen . Pt remains somewhat illogical, concrete  but overall less distracted and internally preoccupied ,  with   slow  steady clinical improvement with continued titration of  Risperdal M tabs ( now 6 mg /day)  and mouth checks to ensure pt med adherence. Pt now better able to initiate and to sustain a brief conversation with select peers and staff  . Pt able to initiate conversation and to sit in the day room to discuss his treatment plan and disposition planning.  Pt continues isolative, reclusive  whenever possible. Pt refused all staff efforts to encourage pt  therapy group attendance .  Pt with better eye contact, polite and cooperative  . The pt remains internally preoccupied  though notably  less so. Pt continues somewhat   wary ,guarded and reclusive  overall.  Pt  with less evident and less pronounced paranoid and persecutory delusions. Pt remains reclusive and with limited interactions with peers or staff even when pt is visible on the unit.     Pt is tolerating  now increased  Risperdal dose of  2 mg M tab q am and 4 mg po qhs M tab to further alleviate severe pt psychosis . Pt , with ongoing impaired judgment and  with virtually no insight into the nature and severity of his illness and the need for ongoing consistent treatment. Pt tried to briefly attend a therapy group but was unable to remain seemingly due to the severity of his paranoid psychosis and affective lability. Pt  a bit more visible on the unit aside from meal time,  but generally engaged in solitary activities. Pt remains socially withdrawn and reclusive, 'escaping' to his room whenever possible.    MOUTH CHECKS in place due to pt paranoid psychosis and lack of insight into his illness and the need for treatment. Plan to continue to  discuss with the pt the possibility of pt's beginning Risperdal Consta q 2 weekly  or Invega Sustenna q 4 weekly with possible future taper and DC of PO Risperdal over time.
Family meeting ( 2/21/18)  Pt seen  with his aunt and uncle, with TAI Ms Dugan and this writer in attendance to review pt clinical status and disposition planning.    Though the pt had continued to maintain that he never heard voices, despite staff's having noted pt talking and laughing to himself frequently during the early part of the pt's admission to hospital, his aunt and uncle also described pt habit of " smiling and laughing to himself ' maybe when he was hearing the good voices' and at other times, pt's trying to plug his ears with his fingers and c/o ear wax, " when he was hearing bad voices." Lengthy discussion as to pt h/o treatment noncompliance and to the pt's overall lack of insight into the nature and severity of his illness and the need for ongoing consistent treatment. Writer again reviewed with the pt and then with pt's family the benefits of having the pt begin q monthly Invega Sustenna in an effort to simplify the pt's med regimen with taper and DC of PO Risperdal after 1-2 months . The pt's family was in agreement with this plan and the pt himself later reconsidered the strategy with pt now in agreement to begin Invega Sustenna 234 mg IM q monthly.   . Pt remains somewhat illogical, concrete  but l less distracted and internally preoccupied ,  with  ongoing, though  slow  steady clinical improvement with continued titration of  Risperdal M tabs ( now 6 mg /day)  and mouth checks to ensure pt med adherence. Pt remains  Pt able to initiate conversation and to sit in the day room to discuss his treatment plan and disposition planning.  Pt continues isolative, reclusive  whenever possible. Pt refused all staff efforts to encourage pt  therapy group attendance .  Pt with better eye contact, polite and cooperative  . The pt continues somewhat   wary ,guarded and reclusive   with  bemused and dreamy superficial interactions with peers and staff.  overall.  Pt  with less evident and less pronounced paranoid and persecutory delusions. Pt remains reclusive and with limited interactions with peers or staff even when pt is visible on the unit.     Pt is tolerating  now increased  Risperdal dose of  2 mg M tab q am and 4 mg po qhs M tab to further alleviate severe pt psychosis . Pt , with ongoing impaired judgment and  with virtually no insight into the nature and severity of his illness and the need for ongoing consistent treatment. Pt tried to briefly attend a therapy group but was unable to remain seemingly due to the severity of his paranoid psychosis and affective lability. Pt  a bit more visible on the unit aside from meal time,  but generally engaged in solitary activities. Pt remains socially withdrawn and reclusive, 'escaping' to his room whenever possible.
Pt seen . Pt remains  thought disordered  but less distracted and internally preoccupied overall and  with   slow  steady clinical improvement with continued titration of  Risperdal M tabs ( now 6 mg /day)  and mouth checks to ensure pt med adherence. Pt now better able to initiate and to sustain a brief conversation with select peers and staff  . Pt able to initiate conversation and to sit in the day room to discuss his treatment plan and disposition planning.  Pt continues isolative, reclusive  whenever possible. Pt refused all staff efforts to encourage pt  therapy group attendance .  Pt with better eye contact, polite and cooperative  . The pt remains internally preoccupied  though notably  less so. Pt continues somewhat   wary ,guarded and reclusive  overall.  Pt  with less evident and less pronounced paranoid and persecutory delusions. Pt remains reclusive and with limited interactions with peers or staff even when pt is visible on the unit.     Pt is tolerating  now increased  Risperdal dose of  2 mg M tab q am and 4 mg po qhs M tab to further alleviate severe pt psychosis . Pt , with ongoing impaired judgment and  with virtually no insight into the nature and severity of his illness and the need for ongoing consistent treatment. Pt tried to briefly attend a therapy group but was unable to remain seemingly due to the severity of his paranoid psychosis and affective lability. Pt  a bit more visible on the unit aside from meal time,  but generally engaged in solitary activities. Pt remains socially withdrawn and reclusive, 'escaping' to his room whenever possible.    MOUTH CHECKS in place due to pt paranoid psychosis and lack of insight into his illness and the need for treatment. Plan to continue to  discuss with the pt the possibility of pt's beginning Risperdal Consta q 2 weekly  or Invega Sustenna q 4 weekly with possible future taper and DC of PO Risperdal over time.
Pt seen. Dressed in street clothes though still with only fair attention to his ADLs  . Pt  remains floridly psychotic , with pt 's continued  smiling and  laughing to himself, The pt remains internally preoccupied and suddenly wary and guarded when approached by staff.  Pt  with ongoing paranoid and persecutory delusions. Pt appears to be actively responding to internal stimuli. Pt remains reclusive. Pt attended therapy group but remained quiet, guarded and suspicious. Pt also sexually preoccupied and socially withdrawn, reclusive. Not able to attend therapy groups due to severity of his  mood and thought disorder. . Pt is tolerating newly begun current Risperdal  M tabs with dose of 0.5 mg po qam and 2 mg po qhs M tabs  Pt cautiously amenable to increase of Risperdal dose to 0.5 mg M tab q am and 3 mg po qhs M tab to further alleviate severe pt psychosis . and affective instability in an effort to decrease pt daytime sedation with bulk of medication to be given at hs..  Pt continues to appear reluctant to disclose much about himself. Pt with furtive eye contact , with impaired judgment and limited insight into the nature and severity of his illness and the need for ongoing consistent treatment. Pt tried to briefly attend a therapy group but was unable to remain seemingly due to the severity of his paranoid psychosis and affective lability. Pt remains socially withdrawn and reclusive, 'escaping' to his room whenever possible.
Pt seen. Dressed in street clothes though with fair  attention to his ADLs   Pt walking up and down the corridor, noted to be trying to avoid stepping .on cracks between floor squares.  Pt more aware of  the basics of his situation though the pt remains mystified as to why others would think he needed to be in hospital at all. Pt with better eye contact, polite and cooperative  though perplexed and bewildered as to why he needed to be brought to hospital.  Pt  remains  psychotic , with pt 's continued  though somewhat less frequent  smiling and  laughing to himself, The pt remains internally preoccupied  though gradually less so. Pt still somewhat   wary and guarded when approached by staff.  Pt  with less evident and less pronounced paranoid and persecutory delusions. Pt remains reclusive despite overall increase in solitary visibility . Pt trying to attend therapy group but remains  quiet, guarded and suspicious. Pt now less sexually preoccupied and socially withdrawn, reclusive. Pt slowly beginning to be able  to attend therapy groups as his thinking and his mood continue to stabilize.. Pt is tolerating newly begun current Risperdal  M tabs with dose of 0.5 mg po qam and 2 mg po qhs M tabs  Pt cautiously amenable to increase of Risperdal dose to 0.5 mg M tab q am and 3 mg po qhs M tab to further alleviate severe pt psychosis . and affective instability in an effort to decrease pt daytime sedation with bulk of medication to be given at hs..  Pt continues to appear reluctant to disclose much about himself. Pt with somewhat better eye contact  though pt continues to appear suspicious and wary of others. Pt , with ongoing impaired judgment and limited insight into the nature and severity of his illness and the need for ongoing consistent treatment. Pt tried to briefly attend a therapy group but was unable to remain seemingly due to the severity of his paranoid psychosis and affective lability. Pt remains socially withdrawn and reclusive, 'escaping' to his room whenever possible.    MOUTH CHECKS in place due to pt paranoid psychosis and lack of insight into his illness and the need for treatment. Plan to discuss with the pt the possibility of pt's beginning Risperdal Consta q 2 weekly with future taper and DC of PO Risperdal in an effort to enhance pt med compliance with the hope of decreasing  pt risk of future clinical relapse.
Pt seen . Pt remains  thought disordered with ongoing though somewhat less frequently noted smiling and laughing to himself as if responding to internal stimuli. Pt continues isolative, reclusive  whenever possible. Pt briefly entering a therapy group, then leaving after making any form of excuse.  When pt is observed in the hallway, he continues , as above , to smile and laugh to himself and appears as if responding to internal stimuli  though he continues to deny any AH .  Pt walking up and down the corridor, noted to be trying to avoid stepping .on cracks between floor squares.  When writer asked  pt about his seeming wish to avoid stepping on lines between squares, pt denied being superstitious, pt's reiterating,, " I'm just bored." Pt more aware of  the basics of his situation though the pt remains baffled and somewhat annoyed  as to why others would think he needed to be in hospital at all. Pt with better eye contact, polite and cooperative  though perplexed and bewildered as to why he needed to be brought to hospital.  Pt  remains  psychotic , with pt 's continued  though somewhat less frequent  smiling and  laughing to himself, The pt remains internally preoccupied  though gradually less so. Pt still somewhat   wary and guarded when approached by staff.  Pt  with less evident and less pronounced paranoid and persecutory delusions. Pt remains reclusive despite overall increase in solitary visibility . Pt is tolerating newly begun current Risperdal  M tabs with dose of 0.5 mg po qam and 3 mg po qhs M tabs. Pt with ravenous appetite  ( prior to hospital admission when on no meds, but likely increased related to addition of Risperdal to alleviate the pt's severe mood and thought disorder.   Pt cautiously amenable to increase of Risperdal dose to 0.5 mg M tab q am and 4 mg po qhs M tab to further alleviate severe pt psychosis . and affective instability in an effort to decrease pt daytime sedation with bulk of medication to be given at hs..  Pt continues to appear reluctant to disclose much about himself. Pt with somewhat better eye contact  though pt continues to appear suspicious and wary of others. Pt , with ongoing impaired judgment and limited insight into the nature and severity of his illness and the need for ongoing consistent treatment. Pt tried to briefly attend a therapy group but was unable to remain seemingly due to the severity of his paranoid psychosis and affective lability. Pt remains socially withdrawn and reclusive, 'escaping' to his room whenever possible.    MOUTH CHECKS in place due to pt paranoid psychosis and lack of insight into his illness and the need for treatment. Plan to discuss with the pt the possibility of pt's beginning Risperdal Consta q 2 weekly with future taper and DC of PO Risperdal in an effort to enhance pt med compliance with the hope of decreasing  pt risk of future clinical relapse.

## 2018-02-26 NOTE — PROGRESS NOTE BEHAVIORAL HEALTH - NS ED BHA MSE SPEECH VOLUME
Soft

## 2018-02-26 NOTE — PROGRESS NOTE BEHAVIORAL HEALTH - NS ED BHA MSE SPEECH RATE
Other/Normal
Other
Other/Normal
Other
Other/Normal
Other
Normal/Other
Normal/Other
Other
Other/Normal
Other
Normal/Other
Other
Normal/Other
Normal/Other
Other

## 2018-02-26 NOTE — DISCHARGE NOTE BEHAVIORAL HEALTH - FAMILY HISTORY OF PSYCHIATRIC ILLNESS
On 1/24/18 , this writer had permission to speak with the pt's aunt Crystal Meneses in [place]  ( tel [phone]) with whom the pt has been residing x the past 2 months prior to his admission to hospital on 1/17/18. Pt's aunt , an RN, who lives with her 14 and 16 yr-old children,  described the pt as " pacing and walking all the time . He appeared to be responding to internal stimuli. Hearing voices." Writer reviewed pt h/o primarily thought d/o without  major mood component and the likelihood of the pt's d/o a schizophrenia spectrum illness.  Discussed pt med regimen of Risperdal as M tabs to enhance pt treatment compliance and to hopefully decrease pt risk of future clinical relapse. 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 He would never say why."

## 2018-02-26 NOTE — PROGRESS NOTE BEHAVIORAL HEALTH - NSBHADMITIPREASON_PSY_A_CORE
Danger to self; mental illness expected to respond to inpatient care

## 2018-02-26 NOTE — PROGRESS NOTE BEHAVIORAL HEALTH - NS ED BHA MSE GENERAL APPEARANCE
No deformities present/Other/Well developed
Well developed/No deformities present/Other
No deformities present/Other/Well developed
Well developed
No deformities present/Other/Well developed
Well developed/No deformities present/Other
Well developed
No deformities present/Well developed/Other
Other/Well developed/No deformities present
Other/Well developed/No deformities present
Well developed
Well developed/No deformities present/Other
Well developed/No deformities present/Other
Well developed/Other/No deformities present
Well developed/No deformities present/Other
Well developed/No deformities present/Other
No deformities present/Well developed/Other
Well developed
Well developed/No deformities present/Other
No deformities present/Other/Well developed
No deformities present/Other/Well developed
No deformities present/Well developed/Other
Well developed/No deformities present/Other
Well developed/Other/No deformities present
Well developed
No deformities present/Other/Well developed
Other/Well developed/No deformities present

## 2018-02-26 NOTE — PROGRESS NOTE BEHAVIORAL HEALTH - NS ED BHA AXIS I SECONDARY1 CODE FT
F12.20

## 2018-03-01 RX ORDER — RISPERIDONE 4 MG/1
2 TABLET ORAL
Qty: 0 | Refills: 0 | Status: ACTIVE | OUTPATIENT
Start: 2018-03-01 | End: 2019-01-28

## 2018-03-01 RX ORDER — RISPERIDONE 4 MG/1
1 TABLET ORAL
Qty: 28 | Refills: 0 | OUTPATIENT
Start: 2018-03-01 | End: 2018-03-14

## 2018-03-01 NOTE — CHART NOTE - NSCHARTNOTEFT_GEN_A_CORE
Post Discharge Addendum    Pt's aunt called to note problem with pt's Medicaid reinstatement. Pt's SW Ms Kailee was contacted as well and she spoke with pt's aunt to guide her through the process of reactivating the pt's Medicaid status.  In the interim, writer will be able to request pt medication from our hospital pharmacy for the pt pending pt's ability to purchase medications as above.   Because the pt received his first Invega Sustenna 234 mg IM on 2/22/18, with next dose due in 1 month on 3/22/18, plan to expedite pt's Risperidone taper with switch from Risperdal M tabs to tablets as follows:  1. E- prescription sent to [facility] for pt Risperdal 2 mg tabs   one tab po bid x 14 days  2.Pt has appointment at [facility] program on 3/5/18 with med management follow up also to be scheduled for pt's next IM Invega Sustenna 234 mg IM on 3/22/18.  3.Pt's aunt to be recontacted 3/1/18 as to the above plan. ( tel [phone]

## 2018-06-04 ENCOUNTER — EMERGENCY (EMERGENCY)
Facility: HOSPITAL | Age: 23
LOS: 1 days | End: 2018-06-04
Payer: MEDICAID

## 2018-06-04 PROCEDURE — 99285 EMERGENCY DEPT VISIT HI MDM: CPT | Mod: 25

## 2018-06-05 ENCOUNTER — INPATIENT (INPATIENT)
Facility: HOSPITAL | Age: 23
LOS: 6 days | Discharge: HOME | End: 2018-06-12
Attending: PSYCHIATRY & NEUROLOGY | Admitting: PSYCHIATRY & NEUROLOGY

## 2018-06-05 VITALS
HEIGHT: 71 IN | WEIGHT: 223.11 LBS | OXYGEN SATURATION: 100 % | SYSTOLIC BLOOD PRESSURE: 121 MMHG | DIASTOLIC BLOOD PRESSURE: 76 MMHG | TEMPERATURE: 98 F | HEART RATE: 71 BPM | RESPIRATION RATE: 14 BRPM

## 2018-06-05 PROCEDURE — 90792 PSYCH DIAG EVAL W/MED SRVCS: CPT | Mod: GT

## 2018-06-05 RX ORDER — RISPERIDONE 4 MG/1
2 TABLET ORAL
Qty: 0 | Refills: 0 | Status: DISCONTINUED | OUTPATIENT
Start: 2018-06-05 | End: 2018-06-08

## 2018-06-05 RX ORDER — QUETIAPINE FUMARATE 200 MG/1
50 TABLET, FILM COATED ORAL AT BEDTIME
Qty: 0 | Refills: 0 | Status: DISCONTINUED | OUTPATIENT
Start: 2018-06-05 | End: 2018-06-05

## 2018-06-05 RX ORDER — HALOPERIDOL DECANOATE 100 MG/ML
5 INJECTION INTRAMUSCULAR EVERY 6 HOURS
Qty: 0 | Refills: 0 | Status: DISCONTINUED | OUTPATIENT
Start: 2018-06-05 | End: 2018-06-12

## 2018-06-05 RX ORDER — RISPERIDONE 4 MG/1
0.5 TABLET ORAL
Qty: 0 | Refills: 0 | Status: DISCONTINUED | OUTPATIENT
Start: 2018-06-05 | End: 2018-06-05

## 2018-06-05 RX ORDER — NICOTINE POLACRILEX 2 MG
2 GUM BUCCAL
Qty: 0 | Refills: 0 | Status: DISCONTINUED | OUTPATIENT
Start: 2018-06-05 | End: 2018-06-12

## 2018-06-05 RX ORDER — DIVALPROEX SODIUM 500 MG/1
500 TABLET, DELAYED RELEASE ORAL AT BEDTIME
Qty: 0 | Refills: 0 | Status: DISCONTINUED | OUTPATIENT
Start: 2018-06-05 | End: 2018-06-12

## 2018-06-05 RX ORDER — ACETAMINOPHEN 500 MG
650 TABLET ORAL EVERY 6 HOURS
Qty: 0 | Refills: 0 | Status: DISCONTINUED | OUTPATIENT
Start: 2018-06-05 | End: 2018-06-12

## 2018-06-05 RX ORDER — DIVALPROEX SODIUM 500 MG/1
250 TABLET, DELAYED RELEASE ORAL DAILY
Qty: 0 | Refills: 0 | Status: DISCONTINUED | OUTPATIENT
Start: 2018-06-05 | End: 2018-06-12

## 2018-06-05 RX ADMIN — DIVALPROEX SODIUM 500 MILLIGRAM(S): 500 TABLET, DELAYED RELEASE ORAL at 20:51

## 2018-06-05 RX ADMIN — RISPERIDONE 2 MILLIGRAM(S): 4 TABLET ORAL at 20:51

## 2018-06-05 NOTE — BEHAVIORAL HEALTH ASSESSMENT NOTE - NSBHCHARTREVIEWVS_PSY_A_CORE FT
Vital Signs Last 24 Hrs  T(C): 36.7 (05 Jun 2018 09:56), Max: 36.7 (05 Jun 2018 09:56)  T(F): 98 (05 Jun 2018 09:56), Max: 98 (05 Jun 2018 09:56)  HR: 71 (05 Jun 2018 09:56) (71 - 71)  BP: 121/76 (05 Jun 2018 09:56) (121/76 - 121/76)  BP(mean): --  RR: 14 (05 Jun 2018 09:56) (14 - 14)  SpO2: 100% (05 Jun 2018 09:56) (100% - 100%)

## 2018-06-06 RX ADMIN — RISPERIDONE 2 MILLIGRAM(S): 4 TABLET ORAL at 08:36

## 2018-06-06 RX ADMIN — DIVALPROEX SODIUM 250 MILLIGRAM(S): 500 TABLET, DELAYED RELEASE ORAL at 08:36

## 2018-06-06 RX ADMIN — DIVALPROEX SODIUM 500 MILLIGRAM(S): 500 TABLET, DELAYED RELEASE ORAL at 21:24

## 2018-06-06 RX ADMIN — RISPERIDONE 2 MILLIGRAM(S): 4 TABLET ORAL at 21:24

## 2018-06-06 NOTE — H&P ADULT - NSHPPHYSICALEXAM_GEN_ALL_CORE
Verified Results  (1) PT WITH INR [id number] 01:16PM Anurag Closs     Test Name Result Flag Reference   INR 2 02 H 0 86-1 16   PT 23 6 seconds H 12 1-14 4
Vital Signs Last 24 Hrs  T(C): 36.8 (06 Jun 2018 08:34), Max: 36.8 (06 Jun 2018 08:34)  T(F): 98.2 (06 Jun 2018 08:34), Max: 98.2 (06 Jun 2018 08:34)  HR: 60 (06 Jun 2018 08:34) (60 - 60)  BP: 126/77 (06 Jun 2018 08:34) (126/77 - 126/77)  RR: 14 (06 Jun 2018 08:34) (14 - 14)  SpO2: 100% (06 Jun 2018 08:34) (100% - 100%)

## 2018-06-06 NOTE — PROGRESS NOTE BEHAVIORAL HEALTH - NSBHFUPINTERVALHXFT_PSY_A_CORE
Pt now denies any hallucinations He is also denying any suicidal ideation or plan .  Pt with goal directed speech and is not appearing to be sedated.  Pt denies any auditory hallucination but appears preoccupied  and is isolated and withdrawn

## 2018-06-06 NOTE — H&P ADULT - ASSESSMENT
21 y/o male with history of Schizophrenia admitted to inpatient psychiatry for evaluation of hearing voices, insomnia and internally preoccupied.     # Schizophrenia  -Primary management per psych    # Marijuana use  -Encourage cessation    DVT Ppx  SCDs

## 2018-06-06 NOTE — H&P ADULT - HISTORY OF PRESENT ILLNESS
21 y/o male with history of Schizophrenia and marijuana use admitted to inpatient psychiatry for evaluation of hearing voices, insomnia and internally preoccupied. Pt is non-compliant with medications. No homicidal or suicidal ideation. Consult called for medical management. Denies fever, chills, CP, SOB, abdominal pain, N/V/D.

## 2018-06-06 NOTE — PROGRESS NOTE BEHAVIORAL HEALTH - NSBHCHARTREVIEWLAB_PSY_A_CORE FT
Complete Blood Count + Automated Diff in AM (02.24.18 @ 07:30)    WBC Count: 4.9 K/uL    RBC Count: 5.44 M/uL    Hemoglobin: 15.0 g/dL    Hematocrit: 46.7 %    Mean Cell Volume: 85.9 fl    Mean Cell Hemoglobin: 27.6 pg    Mean Cell Hemoglobin Conc: 32.1 gm/dL    Red Cell Distrib Width: 12.3 %    Platelet Count - Automated: 168 K/uL    Auto Neutrophil #: 2.1 K/uL    Auto Lymphocyte #: 1.9 K/uL    Auto Monocyte #: 0.5 K/uL    Auto Eosinophil #: 0.3 K/uL    Auto Basophil #: 0.0 K/uL    Auto Neutrophil %: 43.7: Differential percentages must be correlated with absolute numbers for  clinical significance. %    Auto Lymphocyte %: 39.7 %    Auto Monocyte %: 9.7 %    Auto Eosinophil %: 6.1 %    Auto Basophil %: 0.8 %

## 2018-06-06 NOTE — H&P ADULT - ATTENDING COMMENTS
21 y/o male with history of Schizophrenia and marijuana use admitted to inpatient psychiatry for evaluation of hearing voices, insomnia and internally preoccupied. Pt is non-compliant with medications. No homicidal or suicidal ideation. Consult called for medical management. Denies fever, chills, CP, SOB, abdominal pain, N/V/D.     ROS: as above,  On exam: as above.    A/P:  1.Schizophrenia  -Primary management per psychiatry service     2. Marijuana use  -Encourage cessation    3. DVT ppx SCDs

## 2018-06-06 NOTE — H&P ADULT - NSHPSOCIALHISTORY_GEN_ALL_CORE
Lives with aunt. Admit to smoking marijuana 2-3 x per week. Tobacco use: 5 Cigarettes daily. Denies Etoh use.

## 2018-06-07 LAB
ANION GAP SERPL CALC-SCNC: 5 MMOL/L — SIGNIFICANT CHANGE UP (ref 5–17)
BUN SERPL-MCNC: 14 MG/DL — SIGNIFICANT CHANGE UP (ref 7–23)
CALCIUM SERPL-MCNC: 8.6 MG/DL — SIGNIFICANT CHANGE UP (ref 8.5–10.1)
CHLORIDE SERPL-SCNC: 106 MMOL/L — SIGNIFICANT CHANGE UP (ref 96–108)
CO2 SERPL-SCNC: 31 MMOL/L — SIGNIFICANT CHANGE UP (ref 22–31)
CREAT SERPL-MCNC: 0.88 MG/DL — SIGNIFICANT CHANGE UP (ref 0.5–1.3)
GLUCOSE SERPL-MCNC: 85 MG/DL — SIGNIFICANT CHANGE UP (ref 70–99)
HCT VFR BLD CALC: 44.2 % — SIGNIFICANT CHANGE UP (ref 39–50)
HGB BLD-MCNC: 14.8 G/DL — SIGNIFICANT CHANGE UP (ref 13–17)
MCHC RBC-ENTMCNC: 27.9 PG — SIGNIFICANT CHANGE UP (ref 27–34)
MCHC RBC-ENTMCNC: 33.5 GM/DL — SIGNIFICANT CHANGE UP (ref 32–36)
MCV RBC AUTO: 83.4 FL — SIGNIFICANT CHANGE UP (ref 80–100)
NRBC # BLD: 0 /100 WBCS — SIGNIFICANT CHANGE UP (ref 0–0)
PLATELET # BLD AUTO: 197 K/UL — SIGNIFICANT CHANGE UP (ref 150–400)
POTASSIUM SERPL-MCNC: 4 MMOL/L — SIGNIFICANT CHANGE UP (ref 3.5–5.3)
POTASSIUM SERPL-SCNC: 4 MMOL/L — SIGNIFICANT CHANGE UP (ref 3.5–5.3)
RBC # BLD: 5.3 M/UL — SIGNIFICANT CHANGE UP (ref 4.2–5.8)
RBC # FLD: 12.6 % — SIGNIFICANT CHANGE UP (ref 10.3–14.5)
SODIUM SERPL-SCNC: 142 MMOL/L — SIGNIFICANT CHANGE UP (ref 135–145)
TSH SERPL-MCNC: 1.96 UU/ML — SIGNIFICANT CHANGE UP (ref 0.36–3.74)
WBC # BLD: 4.54 K/UL — SIGNIFICANT CHANGE UP (ref 3.8–10.5)
WBC # FLD AUTO: 4.54 K/UL — SIGNIFICANT CHANGE UP (ref 3.8–10.5)

## 2018-06-07 RX ADMIN — RISPERIDONE 2 MILLIGRAM(S): 4 TABLET ORAL at 09:17

## 2018-06-07 RX ADMIN — RISPERIDONE 2 MILLIGRAM(S): 4 TABLET ORAL at 20:13

## 2018-06-07 RX ADMIN — Medication 2 MILLIGRAM(S): at 20:14

## 2018-06-07 RX ADMIN — DIVALPROEX SODIUM 500 MILLIGRAM(S): 500 TABLET, DELAYED RELEASE ORAL at 20:13

## 2018-06-07 RX ADMIN — DIVALPROEX SODIUM 250 MILLIGRAM(S): 500 TABLET, DELAYED RELEASE ORAL at 09:17

## 2018-06-08 RX ORDER — RISPERIDONE 4 MG/1
3 TABLET ORAL AT BEDTIME
Qty: 0 | Refills: 0 | Status: DISCONTINUED | OUTPATIENT
Start: 2018-06-08 | End: 2018-06-11

## 2018-06-08 RX ORDER — RISPERIDONE 4 MG/1
2 TABLET ORAL DAILY
Qty: 0 | Refills: 0 | Status: DISCONTINUED | OUTPATIENT
Start: 2018-06-08 | End: 2018-06-12

## 2018-06-08 RX ADMIN — RISPERIDONE 3 MILLIGRAM(S): 4 TABLET ORAL at 21:58

## 2018-06-08 RX ADMIN — RISPERIDONE 2 MILLIGRAM(S): 4 TABLET ORAL at 09:05

## 2018-06-08 RX ADMIN — DIVALPROEX SODIUM 500 MILLIGRAM(S): 500 TABLET, DELAYED RELEASE ORAL at 21:58

## 2018-06-08 RX ADMIN — DIVALPROEX SODIUM 250 MILLIGRAM(S): 500 TABLET, DELAYED RELEASE ORAL at 09:05

## 2018-06-08 NOTE — PROGRESS NOTE BEHAVIORAL HEALTH - NSBHFUPINTERVALHXFT_PSY_A_CORE
Pt with continued talking to himself. and being isolative eye contact remains fair.   Denies any suicidal ideation or plan.

## 2018-06-09 LAB — VALPROATE SERPL-MCNC: 70 UG/ML — SIGNIFICANT CHANGE UP (ref 50–100)

## 2018-06-09 RX ADMIN — DIVALPROEX SODIUM 500 MILLIGRAM(S): 500 TABLET, DELAYED RELEASE ORAL at 21:23

## 2018-06-09 RX ADMIN — RISPERIDONE 2 MILLIGRAM(S): 4 TABLET ORAL at 09:21

## 2018-06-09 RX ADMIN — RISPERIDONE 3 MILLIGRAM(S): 4 TABLET ORAL at 21:23

## 2018-06-09 RX ADMIN — DIVALPROEX SODIUM 250 MILLIGRAM(S): 500 TABLET, DELAYED RELEASE ORAL at 09:21

## 2018-06-09 NOTE — PROGRESS NOTE BEHAVIORAL HEALTH - NSBHFUPINTERVALHXFT_PSY_A_CORE
Mr. Juarez was encountered at the bedside, denying acute distress, concern, or medication side effect.  He denied current hallucinations, paranoid ideation, or ideations to harm self or others.  He acknowledged his recent experience of auditory hallucinations, expressing a belief that his inpatient treatment is "helping."   Per the other unit staff, no behavioral disturbances noted this morning, and he has been intermittently observed to be talking to himself.   He demonstrated adherence with his treatment yesterday.

## 2018-06-10 RX ADMIN — RISPERIDONE 2 MILLIGRAM(S): 4 TABLET ORAL at 09:46

## 2018-06-10 RX ADMIN — DIVALPROEX SODIUM 500 MILLIGRAM(S): 500 TABLET, DELAYED RELEASE ORAL at 20:40

## 2018-06-10 RX ADMIN — RISPERIDONE 3 MILLIGRAM(S): 4 TABLET ORAL at 20:41

## 2018-06-10 RX ADMIN — DIVALPROEX SODIUM 250 MILLIGRAM(S): 500 TABLET, DELAYED RELEASE ORAL at 09:46

## 2018-06-10 NOTE — PROGRESS NOTE BEHAVIORAL HEALTH - NSBHFUPINTERVALHXFT_PSY_A_CORE
Mr. Juarez was encountered at the bedside, and again noted to deny acute distress, concern, or medication side effect.  He also again denied current hallucinations, paranoid ideation, or ideations to harm self or others.  Per the other unit staff, he demonstrates adherence with his inpatient medication and treatment regimen and no behavioral disturbances were noted this morning, has again been noted to be intermittently observed to be talking to himself (not observed on this morning's assessment).

## 2018-06-11 DIAGNOSIS — F25.0 SCHIZOAFFECTIVE DISORDER, BIPOLAR TYPE: ICD-10-CM

## 2018-06-11 RX ORDER — RISPERIDONE 4 MG/1
3 TABLET ORAL AT BEDTIME
Qty: 0 | Refills: 0 | Status: DISCONTINUED | OUTPATIENT
Start: 2018-06-11 | End: 2018-06-12

## 2018-06-11 RX ORDER — RISPERIDONE 4 MG/1
2 TABLET ORAL AT BEDTIME
Qty: 0 | Refills: 0 | Status: DISCONTINUED | OUTPATIENT
Start: 2018-06-11 | End: 2018-06-11

## 2018-06-11 RX ADMIN — Medication 2 MILLIGRAM(S): at 21:23

## 2018-06-11 RX ADMIN — RISPERIDONE 2 MILLIGRAM(S): 4 TABLET ORAL at 09:18

## 2018-06-11 RX ADMIN — DIVALPROEX SODIUM 250 MILLIGRAM(S): 500 TABLET, DELAYED RELEASE ORAL at 09:18

## 2018-06-11 RX ADMIN — DIVALPROEX SODIUM 500 MILLIGRAM(S): 500 TABLET, DELAYED RELEASE ORAL at 21:23

## 2018-06-11 RX ADMIN — RISPERIDONE 3 MILLIGRAM(S): 4 TABLET ORAL at 21:23

## 2018-06-11 NOTE — PROGRESS NOTE BEHAVIORAL HEALTH - NSBHFUPINTERVALHXFT_PSY_A_CORE
Mr. Juarez was encountered at the bedside, and again noted to deny acute distress, concern, or medication side effect.  He also again denied current hallucinations, paranoid ideation, or ideations to harm self or others.  Per the other unit staff, he demonstrates adherence with his inpatient medication and treatment regimen and no behavioral disturbances were noted this morning, has again been noted to be intermittently observed to be talking to himself (not observed on this morning's assessment). Pt denying any continued hallucinations.  Pt with return of sleep quality.  He claimed that he was "tired a lot" .  However nursing indicated that in the early evening the pt is out of his room and not appearing to be tired.  Also, nursing still indicating that he has periods of talking to himself. He is not appearing to be doing so at this time     Va;proic acid level of 70

## 2018-06-12 VITALS
OXYGEN SATURATION: 100 % | SYSTOLIC BLOOD PRESSURE: 120 MMHG | DIASTOLIC BLOOD PRESSURE: 69 MMHG | HEART RATE: 75 BPM | TEMPERATURE: 98 F | RESPIRATION RATE: 16 BRPM

## 2018-06-12 DIAGNOSIS — F10.10 ALCOHOL ABUSE, UNCOMPLICATED: ICD-10-CM

## 2018-06-12 DIAGNOSIS — Z91.19 PATIENT'S NONCOMPLIANCE WITH OTHER MEDICAL TREATMENT AND REGIMEN: ICD-10-CM

## 2018-06-12 DIAGNOSIS — F12.150 CANNABIS ABUSE WITH PSYCHOTIC DISORDER WITH DELUSIONS: ICD-10-CM

## 2018-06-12 RX ORDER — DIVALPROEX SODIUM 500 MG/1
1 TABLET, DELAYED RELEASE ORAL
Qty: 45 | Refills: 1 | OUTPATIENT
Start: 2018-06-12 | End: 2018-07-11

## 2018-06-12 RX ORDER — RISPERIDONE 4 MG/1
1 TABLET ORAL
Qty: 30 | Refills: 1 | OUTPATIENT
Start: 2018-06-12 | End: 2018-07-11

## 2018-06-12 RX ADMIN — RISPERIDONE 2 MILLIGRAM(S): 4 TABLET ORAL at 09:37

## 2018-06-12 RX ADMIN — DIVALPROEX SODIUM 250 MILLIGRAM(S): 500 TABLET, DELAYED RELEASE ORAL at 09:36

## 2018-06-12 RX ADMIN — Medication 2 MILLIGRAM(S): at 15:58

## 2018-06-12 NOTE — DISCHARGE NOTE BEHAVIORAL HEALTH - NSBHDCMEDSFT_PSY_A_CORE
PATIENT EDUCATED TO NOT DISCONTINUE ANY MEDICATIONS UNLESS OTHERWISE TOLD TO DO SO BY OUTPATIENT PROVIDER MEDICATIONS  (STANDING):  diVALproex  milliGRAM(s) Oral at bedtime  diVALproex  milliGRAM(s) Oral daily  risperiDONE   Tablet 2 milliGRAM(s) Oral daily  risperiDONE   Tablet 3 milliGRAM(s) Oral at bedtime  Pt with good eye contact Denies any hallucination Pt with denial of any anxiety or depressive symptoms. pt able to sleep and speech is now goal directed      PATIENT EDUCATED TO NOT DISCONTINUE ANY MEDICATIONS UNLESS OTHERWISE TOLD TO DO SO BY OUTPATIENT PROVIDER

## 2018-06-12 NOTE — PROGRESS NOTE BEHAVIORAL HEALTH - NSBHPTASSESSDT_PSY_A_CORE
08-Jun-2018 16:38
12-Jun-2018 14:27
08-Jun-2018 16:38
06-Jun-2018 18:28
11-Jun-2018 15:54
08-Jun-2018 16:38

## 2018-06-12 NOTE — PROGRESS NOTE BEHAVIORAL HEALTH - NSBHADMITDANGERSELF_PSY_A_CORE
unable to care for self
suicidal ideation with plan and means
unable to care for self
unable to care for self

## 2018-06-12 NOTE — DISCHARGE NOTE BEHAVIORAL HEALTH - MEDICATION SUMMARY - MEDICATIONS TO TAKE
I will START or STAY ON the medications listed below when I get home from the hospital:    divalproex sodium 250 mg oral delayed release tablet  -- 1 tab(s) by mouth 3 times a day  -- Indication: For Schizoaffective disorder, bipolar type    risperiDONE 3 mg oral tablet, disintegrating  -- 1 tab(s) by mouth 2 times a day  -- Indication: For Schizoaffective disorder, bipolar type

## 2018-06-12 NOTE — PROGRESS NOTE BEHAVIORAL HEALTH - RISK ASSESSMENT
Patient demonstrates acute psychosis symptoms, noted on admission to display internal preoccupation, pacing, vague thought process with thought blocking.  Patient admitted voluntarily and requires continued inpt. level treatment to get back on meds and be stabilized.
Patient demonstrates acute psychosis symptoms, noted on admission to display internal preoccupation, pacing, vague thought process with thought blocking.  Patient admitted voluntarily and requires continued inpt. level treatment to get back on meds and be stabilized.
Patient is internally preoccupied, pacing, vague thought process with thought blocking.  Patient will sign in voluntarily and pt. requires inpt. admission to get back on meds and be stabilized.
Patient is internally preoccupied, pacing, vague thought process with thought blocking.  Patient will sign in voluntarily and pt. requires inpt. admission to get back on meds and be stabilized.
Patient demonstrates acute psychosis symptoms, noted on admission to display internal preoccupation, pacing, vague thought process with thought blocking.  Patient admitted voluntarily and requires continued inpt. level treatment to get back on meds and be stabilized.
Patient demonstrates acute psychosis symptoms, noted on admission to display internal preoccupation, pacing, vague thought process with thought blocking.  Patient admitted voluntarily and requires continued inpt. level treatment to get back on meds and be stabilized.

## 2018-06-12 NOTE — PROGRESS NOTE BEHAVIORAL HEALTH - THOUGHT PROCESS
Impaired reasoning
Linear
Impaired reasoning
Impaired reasoning

## 2018-06-12 NOTE — PROGRESS NOTE BEHAVIORAL HEALTH - THOUGHT CONTENT
Preoccupations/Ruminations
Preoccupations/Ruminations
Other/Unremarkable
Unremarkable
Unremarkable/Other
Unremarkable/Other

## 2018-06-12 NOTE — PROGRESS NOTE BEHAVIORAL HEALTH - NSBHADMITIPOBSFT_PSY_A_CORE
For symptom stabilization and inpatient-level treatment

## 2018-06-12 NOTE — DISCHARGE NOTE BEHAVIORAL HEALTH - CONDITIONS AT DISCHARGE
Patient alert, oriented x3, pleasant and cooperative.  Pt denies any thoughts to harm himself or others.  Pt can have a clear conversation and understands his discharge plan.  Nurse and  reviewed plan including meds with patient.  Pt was given a copy of d/c plan.  Pt provided with a cab to his residence to ensure safe arrival.  All belongings returned to patient.

## 2018-06-12 NOTE — DISCHARGE NOTE BEHAVIORAL HEALTH - HPI (INCLUDE ILLNESS QUALITY, SEVERITY, DURATION, TIMING, CONTEXT, MODIFYING FACTORS, ASSOCIATED SIGNS AND SYMPTOMS)
Patient is a 21 y/o AA male with h/o Schizophrenia, marijuana abuse, post-traumatic stress disorder, with two past psych hospitalizations, bib Aunt (legal guardian) with Aunt complaining of pt. hearing voices and being internally preoccupied.  Patient. with 1 arrest, no violence, no suicide attempts, no self injury.    Patient reports that he is in ER because he has been pacing lately, not sleeping well.  He has extra energy and he has been taking very long distance walks.  He endorses not being on psych meds for a few years.  He use to be on antipsychotics a few years ago.  He remembers being on Seroquel and Risperdal.  But he remembers Seroquel gave him nightmares and made him very tired.  He most recently got fired from his job that he has had since this past summer.  He did not know why he was fired.  Patient. was vague in some of his answers and he laughed and seemed to be responding to internal stimuli.  But pt. denied that he was hearing voices.  He admitted to smoking weed atleast 2-3 times per week.   Patient reports he will sign in voluntarily to get back on meds.  Patient does not have any friends or romantic relationships. Patient is a 21 y/o AA male with h/o Schizophrenia, marijuana abuse, post-traumatic stress disorder, with two past psych hospitalizations, bib Aunt (legal guardian) with Aunt complaining of pt. hearing voices and being internally preoccupied.  Patient. with 1 arrest, no violence, no suicide attempts, no self injury.    Patient reports that he is in ER because he has been pacing lately, not sleeping well.  He has extra energy and he has been taking very long distance walks.  He endorses not being on psych meds for a few years.  He use to be on antipsychotics a few years ago.  He remembers being on Seroquel and Risperdal.  But he remembers Seroquel gave him nightmares and made him very tired.  He most recently got fired from his job that he has had since this past summer.  He did not know why he was fired.  Patient. was vague in some of his answers and he laughed and seemed to be responding to internal stimuli.  But pt. denied that he was hearing voices.  He admitted to smoking weed atleast 2-3 times per week.   Patient reports he will sign in voluntarily to get back on meds.  Patient does not have any friends or romantic relationships.  Pt with very limited insight as he returns for another hospitalization with a similar set of symptoms as he returned to using cannabis and was noncompliant with his medications

## 2018-06-12 NOTE — DISCHARGE NOTE BEHAVIORAL HEALTH - NSBHDCSWCOMMENTSFT_PSY_A_CORE
Patient educated to not stop any medications unless otherwise told to do so by outpatient provider.  Pt educated to remain in treatment and to stay at the residential program to ensure stability.

## 2018-06-12 NOTE — PROGRESS NOTE BEHAVIORAL HEALTH - NSBHFUPINTERVALCCFT_PSY_A_CORE
"I'm okay"
"I'm ready to go home "
"I'm feeling alright"
"I'm too tired to talk, I'm fine"
"I'm good"
"I 'm alright"

## 2018-06-12 NOTE — PROGRESS NOTE BEHAVIORAL HEALTH - NSBHCHARTREVIEWVS_PSY_A_CORE FT
Vital Signs Last 24 Hrs  T(C): 36.8 (12 Jun 2018 07:21), Max: 36.8 (12 Jun 2018 07:21)  T(F): 98.3 (12 Jun 2018 07:21), Max: 98.3 (12 Jun 2018 07:21)  HR: 75 (12 Jun 2018 07:21) (75 - 75)  BP: 120/69 (12 Jun 2018 07:21) (120/69 - 120/69)  BP(mean): --  RR: 16 (12 Jun 2018 07:21) (16 - 16)  SpO2: 100% (12 Jun 2018 07:21) (100% - 100%)
Vital Signs Last 24 Hrs  T(C): 36.4 (10 Fili 2018 07:27), Max: 36.4 (10 Fili 2018 07:27)  T(F): 97.6 (10 Fili 2018 07:27), Max: 97.6 (10 Fili 2018 07:27)  HR: 56 (10 Fili 2018 07:27) (56 - 56)  BP: 116/69 (10 Fili 2018 07:27) (116/69 - 116/69)  BP(mean): --  RR: 16 (10 Fili 2018 07:27) (16 - 16)  SpO2: 99% (10 Fili 2018 07:27) (99% - 99%)
Vital Signs Last 24 Hrs  T(C): 36.7 (08 Jun 2018 07:35), Max: 36.7 (08 Jun 2018 07:35)  T(F): 98.1 (08 Jun 2018 07:35), Max: 98.1 (08 Jun 2018 07:35)  HR: 64 (08 Jun 2018 07:35) (64 - 64)  BP: 107/63 (08 Jun 2018 07:35) (107/63 - 107/63)  BP(mean): --  RR: 16 (08 Jun 2018 07:35) (16 - 16)  SpO2: 100% (08 Jun 2018 07:35) (100% - 100%)
Vital Signs Last 24 Hrs  T(C): 36.4 (10 Fili 2018 07:27), Max: 36.4 (10 Fili 2018 07:27)  T(F): 97.6 (10 Fili 2018 07:27), Max: 97.6 (10 Fili 2018 07:27)  HR: 56 (10 Fili 2018 07:27) (56 - 56)  BP: 116/69 (10 Fili 2018 07:27) (116/69 - 116/69)  BP(mean): --  RR: 16 (10 Fili 2018 07:27) (16 - 16)  SpO2: 99% (10 Fili 2018 07:27) (99% - 99%)
Vital Signs Last 24 Hrs  T(C): 36.8 (06 Jun 2018 08:34), Max: 36.8 (06 Jun 2018 08:34)  T(F): 98.2 (06 Jun 2018 08:34), Max: 98.2 (06 Jun 2018 08:34)  HR: 60 (06 Jun 2018 08:34) (60 - 60)  BP: 126/77 (06 Jun 2018 08:34) (126/77 - 126/77)  BP(mean): --  RR: 14 (06 Jun 2018 08:34) (14 - 14)  SpO2: 100% (06 Jun 2018 08:34) (100% - 100%)
Vital Signs Last 24 Hrs  T(C): 36.8 (09 Jun 2018 08:42), Max: 36.8 (09 Jun 2018 08:42)  T(F): 98.2 (09 Jun 2018 08:42), Max: 98.2 (09 Jun 2018 08:42)  HR: 84 (09 Jun 2018 08:42) (84 - 84)  BP: 119/74 (09 Jun 2018 08:42) (119/74 - 119/74)  BP(mean): --  RR: 17 (09 Jun 2018 08:42) (17 - 17)  SpO2: 100% (09 Jun 2018 08:42) (100% - 100%)

## 2018-06-12 NOTE — PROGRESS NOTE BEHAVIORAL HEALTH - NSBHCHARTREVIEWINVESTIGATE_PSY_A_CORE FT
QRS axis to [] ° and NSR at a rate of [] BPM. There was no atrial enlargement. There was no ventricular hypertrophy. There were no ST-T changes and all intervals were normal.

## 2018-06-12 NOTE — DISCHARGE NOTE BEHAVIORAL HEALTH - NSBHDCCRISISPLAN1FT_PSY_A_CORE
Call 911, go to the nearest emergency room, call my MD or therapist, tell a family member or trusted friend

## 2018-06-12 NOTE — DISCHARGE NOTE BEHAVIORAL HEALTH - SECONDARY DIAGNOSIS.
Cannabis abuse with psychotic disorder, with hallucinations Noncompliance with medication treatment due to underuse of medication

## 2018-06-12 NOTE — PROGRESS NOTE BEHAVIORAL HEALTH - SUMMARY
21 y/o AA male with h/o Schizophrenia, marijuana abuse, post-traumatic stress disorder, with two past psych hospitalizations, bib Aunt (legal guardian) with Aunt complaining of pt. hearing voices and being internally preoccupied.  Patient. with 1 arrest, no violence, no suicide attempts, no self injury.    This morning he is again noted to demonstrate euthymic mood, with a constricted affect, acknowledging his recent auditory hallucinations, yet denying them now, and demonstrating adherence with treatment.  He denies current ideations to harm self or others.    # Schizophrenia  # Cannabis use disorder  # Post traumatic stress disorder    [] Continue current treatment plan of care (per current [facility] orders)
23 y/o AA male with h/o Schizophrenia, marijuana abuse, post-traumatic stress disorder, with two past psych hospitalizations, bib Aunt (legal guardian) with Aunt complaining of pt. hearing voices and being internally preoccupied.  Patient. with 1 arrest, no violence, no suicide attempts, no self injury.    This morning he is noted to demonstrate euthymic mood, reactive, mood congruent affect, acknowledge recent auditory hallucinations, denying them now, and demonstrating adherence with treatment.  He denies current ideations to harm self or others.    # Schizophrenia  # Cannabis use disorder  # Post traumatic stress disorder    [] Continue current treatment plan of care (per current [facility] orders)
Patient is a 23 y/o AA male with h/o Schizophrenia, marijuana abuse, post-traumatic stress disorder, with two past psych hospitalizations, bib Aunt (legal guardian) with Aunt complaining of pt. hearing voices and being internally preoccupied.  Patient. with 1 arrest, no violence, no suicide attempts, no self injury.    Patient reports that he is in ER because he has been pacing lately, not sleeping well.  He has extra energy and he has been taking very long distance walks.  He endorses not being on psych meds for a few years.  He use to be on antipsychotics a few years ago.  He remembers being on Seroquel and Risperdal.  But he remembers Seroquel gave him nightmares and made him very tired.  He most recently got fired from his job that he has had since this past summer.  He did not know why he was fired.  Patient. was vague in some of his answers and he laughed and seemed to be responding to internal stimuli.  But pt. denied that he was hearing voices.  He admitted to smoking weed atleast 2-3 times per week.   Patient reports he will sign in voluntarily to get back on meds.  Patient does not have any friends or romantic relationships.
Patient is a 21 y/o AA male with h/o Schizophrenia, marijuana abuse, post-traumatic stress disorder, with two past psych hospitalizations, bib Aunt (legal guardian) with Aunt complaining of pt. hearing voices and being internally preoccupied.  Patient. with 1 arrest, no violence, no suicide attempts, no self injury.    Patient reports that he is in ER because he has been pacing lately, not sleeping well.  He has extra energy and he has been taking very long distance walks.  He endorses not being on psych meds for a few years.  He use to be on antipsychotics a few years ago.  He remembers being on Seroquel and Risperdal.  But he remembers Seroquel gave him nightmares and made him very tired.  He most recently got fired from his job that he has had since this past summer.  He did not know why he was fired.  Patient. was vague in some of his answers and he laughed and seemed to be responding to internal stimuli.  But pt. denied that he was hearing voices.  He admitted to smoking weed atleast 2-3 times per week.   Patient reports he will sign in voluntarily to get back on meds.  Patient does not have any friends or romantic relationships.
23 y/o AA male with h/o Schizophrenia, marijuana abuse, post-traumatic stress disorder, with two past psych hospitalizations, bib Aunt (legal guardian) with Aunt complaining of pt. hearing voices and being internally preoccupied.  Patient. with 1 arrest, no violence, no suicide attempts, no self injury.    This morning he is again noted to demonstrate euthymic mood, with a constricted affect, acknowledging his recent auditory hallucinations, yet denying them now, and demonstrating adherence with treatment.  He denies current ideations to harm self or others.    # Schizophrenia  # Cannabis use disorder  # Post traumatic stress disorder    [] Continue current treatment plan of care (per current [facility] orders)
23 y/o AA male with h/o Schizophrenia, marijuana abuse, post-traumatic stress disorder, with two past psych hospitalizations, bib Aunt (legal guardian) with Aunt complaining of pt. hearing voices and being internally preoccupied.  Patient. with 1 arrest, no violence, no suicide attempts, no self injury.    This morning he is again noted to demonstrate euthymic mood, with a constricted affect, acknowledging his recent auditory hallucinations, yet denying them now, and demonstrating adherence with treatment.  He denies current ideations to harm self or others.    # Schizophrenia  # Cannabis use disorder  # Post traumatic stress disorder    [] Continue current treatment plan of care (per current [facility] orders)

## 2018-06-12 NOTE — DISCHARGE NOTE BEHAVIORAL HEALTH - PAST PSYCHIATRIC HISTORY
1) two prior inpatient hosp to [facility]    3)[facility] 1/17/2018- Feb 25,2018 and the pt was willing at that admissopn to be on invega sustenna 234mg q4 weeks   last injection was on 3/22/18     4)However on this current hospitalization to [facility] he has refused to be on any injectable medication

## 2018-06-12 NOTE — PROGRESS NOTE BEHAVIORAL HEALTH - NSBHFUPINTERVALHXFT_PSY_A_CORE
Valproic acid level of 70    Pt with good eye contact Alert Pt with denial of any side effects Denies any suicidal ideation Pt with goal directed speech Pt with verbalizing willingness to attend aftercare .  Pt refused any restart of long acting injectable medication Pt refused the risperdal consta or the invega sustena,  Pt indicated he would only continue with the oral pills.    Discussed with the pt again about the use of medication and of the potential side effects including dystonia and dyskinesia. and TD

## 2018-06-12 NOTE — DISCHARGE NOTE BEHAVIORAL HEALTH - NSBHDCRESPONSEFT_PSY_A_CORE
It was discussed with the pt about the use of medication and of the potential side effects. Therefore, the  pt is aware of the use of the medication and of the potential side effects including sedation , tremors, dystonia, dyskinesia and of TD and the need for continued monitoring for any signs of side effects, and to inform her treating MD/provider  of any concerns or finding of possible side effects.

## 2018-06-12 NOTE — DISCHARGE NOTE BEHAVIORAL HEALTH - NS AS DC FOLLOWUP STROKE INST
Smoking Cessation/Influenza vaccination (VIS Pub Date: August 7, 2015) Medication information; discharge instructions.

## 2018-06-12 NOTE — DISCHARGE NOTE BEHAVIORAL HEALTH - NSBHDCCONDITIONFT_PSY_A_CORE
MEDICATIONS  (STANDING):  diVALproex  milliGRAM(s) Oral at bedtime  diVALproex  milliGRAM(s) Oral daily  risperiDONE   Tablet 2 milliGRAM(s) Oral daily  risperiDONE   Tablet 3 milliGRAM(s) Oral at bedtime

## 2018-06-12 NOTE — DISCHARGE NOTE BEHAVIORAL HEALTH - NSBHDCADMRISKMITFT_PSY_A_CORE
1. Pt is directed to stop using substances and alcohol.  Pt was also given information on the effects of substances on his thought process and behavior. Pt was also told of the dangers of the effects of substances in causing metabolic changes and can even lead to disablity and death 1. Pt is directed to stop using substances and alcohol.  Pt was also given information on the effects of substances on his thought process and behavior. Pt was also told of the dangers of the effects of substances in causing metabolic changes and can even lead to disability and death  2.Pt is directed to continue with all medications until directed by his MD to change or discontinued with medications  3. pt to develop relapse prevention plan to stay off of substances

## 2018-06-12 NOTE — DISCHARGE NOTE BEHAVIORAL HEALTH - NSBHDCADDFT_PSY_A_CORE
Cholesterol, Serum 178    mg/dL  10 - 199 Final         *LAB RESULTS FROM 2/2018 UNDER HIE TAB -    Triglycerides, Serum 64    mg/dL  10 - 149 Final         HDL Cholesterol, Serum 70    mg/dL  40 - 125 Final       Thyroid Stimulating Hormone, Serum 2.52    uIU/mL  0.27 - 4.20 Final     HDL/Total Cholesterol Ratio Measurement 2.5  Low RATIO  3.4 - 9.6 Final         LDL Calculated 95    mg/dL  <=129 Final  HEMOGLOBIN A1C 5.5  THYROID 1.9

## 2018-06-12 NOTE — PROGRESS NOTE BEHAVIORAL HEALTH - PERCEPTIONS
No abnormalities
Auditory hallucinations
Auditory hallucinations
No abnormalities

## 2018-06-15 DIAGNOSIS — Z91.19 PATIENT'S NONCOMPLIANCE WITH OTHER MEDICAL TREATMENT AND REGIMEN: ICD-10-CM

## 2018-06-15 DIAGNOSIS — F12.150 CANNABIS ABUSE WITH PSYCHOTIC DISORDER WITH DELUSIONS: ICD-10-CM

## 2018-06-15 DIAGNOSIS — F20.9 SCHIZOPHRENIA, UNSPECIFIED: ICD-10-CM

## 2018-06-15 DIAGNOSIS — F25.0 SCHIZOAFFECTIVE DISORDER, BIPOLAR TYPE: ICD-10-CM

## 2018-06-15 DIAGNOSIS — F43.10 POST-TRAUMATIC STRESS DISORDER, UNSPECIFIED: ICD-10-CM

## 2018-06-15 DIAGNOSIS — F10.10 ALCOHOL ABUSE, UNCOMPLICATED: ICD-10-CM

## 2018-10-03 NOTE — PATIENT PROFILE BEHAVIORAL HEALTH - MEDICATION PATCH, PROFILE
October 3, 2018      [facility] [address], [address] [address]  Phone: [phone]  Fax: [phone]       Patient: Monalisa Carson   YOB: 1964  Date of Visit: 10/03/2018    To Whom It May Concern:    Randolph Carson  was at [facility] on 10/03/2018. She may return to work/school on 10/05/2018 with no restrictions. If you have any questions or concerns, or if I can be of further assistance, please do not hesitate to contact me.    Sincerely,        Radha Lennon, NP      none

## 2021-09-09 NOTE — ED ADULT TRIAGE NOTE - SPO2 (%)
Shave lesion    Date/Time: 9/9/2021 5:11 PM  Performed by: Jeancarlos Wong DO  Authorized by: Jeancarlos Wong DO   Universal Protocol:  Patient identity confirmed: verbally with patient      Number of Lesions: 1  Lesion 1:     Body area: head/neck    Head/neck location: forehead (R temporal)    Initial size (mm): 12    Final defect size (mm): 12    Malignancy: malignancy unknown      Destruction method: shave removal      Comments:  Post care instructions given to patient, sample sent  For pathology 97

## 2022-02-14 NOTE — PROGRESS NOTE BEHAVIORAL HEALTH - NS ED B[facility] REVIEW OF ED CHART AVAILABLE IMAGING REVIEWED
Moderna dose 1, 2, and 3
None available

## 2022-04-11 NOTE — PROGRESS NOTE BEHAVIORAL HEALTH - NSBHFUPTYPE_PSY_A_CORE
Follow up in 6 months, sooner if necessary    Please get fasting blood test at least 1 day prior to next appointment. Fast for 10 hours. It is OK to take medications. DO DRINK ENOUGH WATER, but no coffee, tea, or other beverages     Please have your eye doctor send me a report of your eye exam.  [phone] Fax      See Gastroenterology ([facility]). Call [phone] to schedule appointment for colonoscopy with Dr. Pierce   
Inpatient

## 2022-09-13 NOTE — DISCHARGE NOTE BEHAVIORAL HEALTH - MODE OF TRANSPORTATION
Quality 431: Preventive Care And Screening: Unhealthy Alcohol Use - Screening: Patient not screened for unhealthy alcohol use using a systematic screening method
Quality 110: Preventive Care And Screening: Influenza Immunization: Influenza immunization was not ordered or administered, reason not given
Detail Level: Detailed
Quality 402: Tobacco Use And Help With Quitting Among Adolescents: Patient screened for tobacco and never smoked
Quality 130: Documentation Of Current Medications In The Medical Record: Current Medications Documented
Ambulatory

## 2022-10-15 ENCOUNTER — EMERGENCY (EMERGENCY)
Facility: HOSPITAL | Age: 27
LOS: 1 days | Discharge: HOME | End: 2022-10-15
Attending: STUDENT IN AN ORGANIZED HEALTH CARE EDUCATION/TRAINING PROGRAM
Payer: MEDICAID

## 2022-10-15 VITALS
DIASTOLIC BLOOD PRESSURE: 64 MMHG | SYSTOLIC BLOOD PRESSURE: 102 MMHG | HEART RATE: 78 BPM | RESPIRATION RATE: 16 BRPM | HEIGHT: 71 IN | OXYGEN SATURATION: 98 % | TEMPERATURE: 98 F

## 2022-10-15 PROCEDURE — 99285 EMERGENCY DEPT VISIT HI MDM: CPT

## 2022-10-15 NOTE — ED ADULT TRIAGE NOTE - CHIEF COMPLAINT QUOTE
Ambulatory reporting need for medical clearance so her can "get into [facility] for psychiatric help." Patient denies SI/HI, auditory/visual hallucinations, drugs or alcohol. Patient has flight of ideas, talking about a "twitch in his neck that causes him to have white outs that people have witnessed that he doesn't remember what he is doing" and that it causes "severe twitching in his neck." When asked about why he needs a psychiatrist he focuses on talking about the twitch. No history of psych admissions but reports speaking with a psychiatrist frequently.

## 2022-10-16 VITALS
DIASTOLIC BLOOD PRESSURE: 86 MMHG | OXYGEN SATURATION: 99 % | SYSTOLIC BLOOD PRESSURE: 127 MMHG | RESPIRATION RATE: 17 BRPM | HEART RATE: 66 BPM

## 2022-10-16 PROBLEM — F20.9 SCHIZOPHRENIA, UNSPECIFIED: Chronic | Status: ACTIVE | Noted: 2018-06-06

## 2022-10-16 LAB
ANION GAP SERPL CALC-SCNC: 10 MMOL/L — SIGNIFICANT CHANGE UP (ref 5–17)
APAP SERPL-MCNC: <3 UG/ML — LOW (ref 10–26)
BASOPHILS # BLD AUTO: 0.03 K/UL — SIGNIFICANT CHANGE UP (ref 0–0.2)
BASOPHILS NFR BLD AUTO: 0.4 % — SIGNIFICANT CHANGE UP (ref 0–2)
BUN SERPL-MCNC: 11.3 MG/DL — SIGNIFICANT CHANGE UP (ref 8–20)
CALCIUM SERPL-MCNC: 8.7 MG/DL — SIGNIFICANT CHANGE UP (ref 8.4–10.5)
CHLORIDE SERPL-SCNC: 103 MMOL/L — SIGNIFICANT CHANGE UP (ref 98–107)
CO2 SERPL-SCNC: 25 MMOL/L — SIGNIFICANT CHANGE UP (ref 22–29)
CREAT SERPL-MCNC: 0.67 MG/DL — SIGNIFICANT CHANGE UP (ref 0.5–1.3)
EGFR: 132 ML/MIN/1.73M2 — SIGNIFICANT CHANGE UP
EOSINOPHIL # BLD AUTO: 0.57 K/UL — HIGH (ref 0–0.5)
EOSINOPHIL NFR BLD AUTO: 7.7 % — HIGH (ref 0–6)
ETHANOL SERPL-MCNC: <10 MG/DL — SIGNIFICANT CHANGE UP (ref 0–9)
GLUCOSE SERPL-MCNC: 126 MG/DL — HIGH (ref 70–99)
HCT VFR BLD CALC: 42.3 % — SIGNIFICANT CHANGE UP (ref 39–50)
HGB BLD-MCNC: 14.1 G/DL — SIGNIFICANT CHANGE UP (ref 13–17)
IMM GRANULOCYTES NFR BLD AUTO: 0.3 % — SIGNIFICANT CHANGE UP (ref 0–0.9)
LYMPHOCYTES # BLD AUTO: 3.15 K/UL — SIGNIFICANT CHANGE UP (ref 1–3.3)
LYMPHOCYTES # BLD AUTO: 42.6 % — SIGNIFICANT CHANGE UP (ref 13–44)
MCHC RBC-ENTMCNC: 28.3 PG — SIGNIFICANT CHANGE UP (ref 27–34)
MCHC RBC-ENTMCNC: 33.3 GM/DL — SIGNIFICANT CHANGE UP (ref 32–36)
MCV RBC AUTO: 84.9 FL — SIGNIFICANT CHANGE UP (ref 80–100)
MONOCYTES # BLD AUTO: 0.44 K/UL — SIGNIFICANT CHANGE UP (ref 0–0.9)
MONOCYTES NFR BLD AUTO: 5.9 % — SIGNIFICANT CHANGE UP (ref 2–14)
NEUTROPHILS # BLD AUTO: 3.19 K/UL — SIGNIFICANT CHANGE UP (ref 1.8–7.4)
NEUTROPHILS NFR BLD AUTO: 43.1 % — SIGNIFICANT CHANGE UP (ref 43–77)
PLATELET # BLD AUTO: 246 K/UL — SIGNIFICANT CHANGE UP (ref 150–400)
POTASSIUM SERPL-MCNC: 3.9 MMOL/L — SIGNIFICANT CHANGE UP (ref 3.5–5.3)
POTASSIUM SERPL-SCNC: 3.9 MMOL/L — SIGNIFICANT CHANGE UP (ref 3.5–5.3)
RBC # BLD: 4.98 M/UL — SIGNIFICANT CHANGE UP (ref 4.2–5.8)
RBC # FLD: 13.8 % — SIGNIFICANT CHANGE UP (ref 10.3–14.5)
SALICYLATES SERPL-MCNC: <0.6 MG/DL — LOW (ref 10–20)
SARS-COV-2 RNA SPEC QL NAA+PROBE: SIGNIFICANT CHANGE UP
SODIUM SERPL-SCNC: 138 MMOL/L — SIGNIFICANT CHANGE UP (ref 135–145)
WBC # BLD: 7.4 K/UL — SIGNIFICANT CHANGE UP (ref 3.8–10.5)
WBC # FLD AUTO: 7.4 K/UL — SIGNIFICANT CHANGE UP (ref 3.8–10.5)

## 2022-10-16 PROCEDURE — 99283 EMERGENCY DEPT VISIT LOW MDM: CPT

## 2022-10-16 PROCEDURE — U0005: CPT

## 2022-10-16 PROCEDURE — 85025 COMPLETE CBC W/AUTO DIFF WBC: CPT

## 2022-10-16 PROCEDURE — 36415 COLL VENOUS BLD VENIPUNCTURE: CPT

## 2022-10-16 PROCEDURE — 71045 X-RAY EXAM CHEST 1 VIEW: CPT | Mod: 26

## 2022-10-16 PROCEDURE — 80307 DRUG TEST PRSMV CHEM ANLYZR: CPT

## 2022-10-16 PROCEDURE — 71045 X-RAY EXAM CHEST 1 VIEW: CPT

## 2022-10-16 PROCEDURE — 80048 BASIC METABOLIC PNL TOTAL CA: CPT

## 2022-10-16 PROCEDURE — U0003: CPT

## 2022-10-16 NOTE — ED ADULT NURSE NOTE - CHIEF COMPLAINT QUOTE
Ambulatory reporting need for medical clearance so her can "get into [facility] for psychiatric help." Patient denies SI/HI, auditory/visual hallucinations, drugs or alcohol. Patient has flight of ideas, talking about a "twitch in his neck that causes him to have white outs that people have witnessed that he doesn't remember what he is doing" and that it causes "severe twitching in his neck." When asked about why he needs a psychiatrist he focuses on talking about the twitch. No history of psych admissions but reports speaking with a psychiatrist frequently.

## 2022-10-16 NOTE — ED PROVIDER NOTE - OBJECTIVE STATEMENT
27yM with pmh schizophrenia presenting for psychiatric evaluation. Patient is resistant to discuss why he wants to speak to psychiatry. He states " you wound not understand if I explained it to you". Attempted a number of times to redirect. Patient appear to be fixated on a "twinge" in the side of his neck. Denies difficulties swallowing, dyspnea, fever, cough, chill, or chest pain. Denies currently taking medications for his schizophrenia. He reports that he lives with his cousin. Denies AH/VH and denies SIIP/HIIP.

## 2022-10-16 NOTE — ED PROVIDER NOTE - CLINICAL SUMMARY MEDICAL DECISION MAKING FREE TEXT BOX
27yM with pmh schizophrenia presenting for psychiatric evaluation. Patient not clear on why he wants to see psychiatry but reports he want to be seen for his "mental issue". Denies SIIP/HIIP and AH/VH. Low suspicion for dystonic reaction with respect to neck "twinge" given he is not currently taking medications. No extra-pyramidal symptoms on exam. Will obtain clearance labs and reassess for now.

## 2022-10-16 NOTE — ED ADULT NURSE NOTE - OBJECTIVE STATEMENT
pt states he has a twitch in his ear and his neck and sometimes he has "white outs" and doesn't remember what happens. pt states at times he can be aggressive but denies any SI/HI. pt wants to speak to psych. respirations even and unlabored.

## 2022-10-16 NOTE — ED PROVIDER NOTE - ATTENDING CONTRIBUTION TO CARE
Damion LOPES: I performed a face to face evaluation of this patient and performed a full history and physical examination on the patient.  I agree with the resident's history, physical examination, and plan of the patient unless otherwise noted. My brief assessment is as follows:    27y M w/ hx schizophrenia, presents requesting medical evaluation. Pt withdrawn and not forthcoming with information. Initially told triage that he wanted a psychiatric eval. No SI/HI/AVH. No signs of psychosis. Workup unremarkable. No indication for psych eval at this time. Will discharge.

## 2022-10-16 NOTE — ED PROVIDER NOTE - NSFOLLOWUPINSTRUCTIONS_ED_ALL_ED_FT
- Follow up with your primary care doctor  - Take your home medications as prescribed  - Return to the emergency room for any new or worsening issues

## 2022-10-16 NOTE — ED ADULT NURSE NOTE - CAS ELECT INFOMATION PROVIDED
Fluconazole Counseling:  Patient counseled regarding adverse effects of fluconazole including but not limited to headache, diarrhea, nausea, upset stomach, liver function test abnormalities, taste disturbance, and stomach pain.  There is a rare possibility of liver failure that can occur when taking fluconazole.  The patient understands that monitoring of LFTs and kidney function test may be required, especially at baseline. The patient verbalized understanding of the proper use and possible adverse effects of fluconazole.  All of the patient's questions and concerns were addressed. Hydroxychloroquine Counseling:  I discussed with the patient that a baseline ophthalmologic exam is needed at the start of therapy and every year thereafter while on therapy. A CBC may also be warranted for monitoring.  The side effects of this medication were discussed with the patient, including but not limited to agranulocytosis, aplastic anemia, seizures, rashes, retinopathy, and liver toxicity. Patient instructed to call the office should any adverse effect occur.  The patient verbalized understanding of the proper use and possible adverse effects of Plaquenil.  All the patient's questions and concerns were addressed. Oxybutynin Pregnancy And Lactation Text: This medication is Pregnancy Category B and is considered safe during pregnancy. It is unknown if it is excreted in breast milk. Terbinafine Pregnancy And Lactation Text: This medication is Pregnancy Category B and is considered safe during pregnancy. It is also excreted in breast milk and breast feeding isn't recommended. Eucrisa Counseling: Patient may experience a mild burning sensation during topical application. Eucrisa is not approved in children less than 2 years of age. Benzoyl Peroxide Pregnancy And Lactation Text: This medication is Pregnancy Category C. It is unknown if benzoyl peroxide is excreted in breast milk. Infliximab Pregnancy And Lactation Text: This medication is Pregnancy Category B and is considered safe during pregnancy. It is unknown if this medication is excreted in breast milk. Topical Clindamycin Counseling: Patient counseled that this medication may cause skin irritation or allergic reactions.  In the event of skin irritation, the patient was advised to reduce the amount of the drug applied or use it less frequently.   The patient verbalized understanding of the proper use and possible adverse effects of clindamycin.  All of the patient's questions and concerns were addressed. Dupixent Counseling: I discussed with the patient the risks of dupilumab including but not limited to eye infection and irritation, cold sores, injection site reactions, worsening of asthma, allergic reactions and increased risk of parasitic infection.  Live vaccines should be avoided while taking dupilumab. Dupilumab will also interact with certain medications such as warfarin and cyclosporine. The patient understands that monitoring is required and they must alert us or the primary physician if symptoms of infection or other concerning signs are noted. Cyclophosphamide Pregnancy And Lactation Text: This medication is Pregnancy Category D and it isn't considered safe during pregnancy. This medication is excreted in breast milk. Valtrex Counseling: I discussed with the patient the risks of valacyclovir including but not limited to kidney damage, nausea, vomiting and severe allergy.  The patient understands that if the infection seems to be worsening or is not improving, they are to call. Clindamycin Counseling: I counseled the patient regarding use of clindamycin as an antibiotic for prophylactic and/or therapeutic purposes. Clindamycin is active against numerous classes of bacteria, including skin bacteria. Side effects may include nausea, diarrhea, gastrointestinal upset, rash, hives, yeast infections, and in rare cases, colitis. Picato Pregnancy And Lactation Text: This medication is Pregnancy Category C. It is unknown if this medication is excreted in breast milk. Dr Bruno Include Pregnancy/Lactation Warning?: No Topical Sulfur Applications Counseling: Topical Sulfur Counseling: Patient counseled that this medication may cause skin irritation or allergic reactions.  In the event of skin irritation, the patient was advised to reduce the amount of the drug applied or use it less frequently.   The patient verbalized understanding of the proper use and possible adverse effects of topical sulfur application.  All of the patient's questions and concerns were addressed. Clindamycin Pregnancy And Lactation Text: This medication can be used in pregnancy if certain situations. Clindamycin is also present in breast milk. Topical Sulfur Applications Pregnancy And Lactation Text: This medication is Pregnancy Category C and has an unknown safety profile during pregnancy. It is unknown if this topical medication is excreted in breast milk. Quinolones Counseling:  I discussed with the patient the risks of fluoroquinolones including but not limited to GI upset, allergic reaction, drug rash, diarrhea, dizziness, photosensitivity, yeast infections, liver function test abnormalities, tendonitis/tendon rupture. Acitretin Pregnancy And Lactation Text: This medication is Pregnancy Category X and should not be given to women who are pregnant or may become pregnant in the future. This medication is excreted in breast milk. Xeljlz Pregnancy And Lactation Text: This medication is Pregnancy Category D and is not considered safe during pregnancy.  The risk during breast feeding is also uncertain. Albendazole Pregnancy And Lactation Text: This medication is Pregnancy Category C and it isn't known if it is safe during pregnancy. It is also excreted in breast milk. Colchicine Pregnancy And Lactation Text: This medication is Pregnancy Category C and isn't considered safe during pregnancy. It is excreted in breast milk. Dapsone Counseling: I discussed with the patient the risks of dapsone including but not limited to hemolytic anemia, agranulocytosis, rashes, methemoglobinemia, kidney failure, peripheral neuropathy, headaches, GI upset, and liver toxicity.  Patients who start dapsone require monitoring including baseline LFTs and weekly CBCs for the first month, then every month thereafter.  The patient verbalized understanding of the proper use and possible adverse effects of dapsone.  All of the patient's questions and concerns were addressed. Xolair Counseling:  Patient informed of potential adverse effects including but not limited to fever, muscle aches, rash and allergic reactions.  The patient verbalized understanding of the proper use and possible adverse effects of Xolair.  All of the patient's questions and concerns were addressed. Fluconazole Pregnancy And Lactation Text: This medication is Pregnancy Category C and it isn't know if it is safe during pregnancy. It is also excreted in breast milk. Carac Counseling:  I discussed with the patient the risks of Carac including but not limited to erythema, scaling, itching, weeping, crusting, and pain. Birth Control Pills Counseling: Birth Control Pill Counseling: I discussed with the patient the potential side effects of OCPs including but not limited to increased risk of stroke, heart attack, thrombophlebitis, deep venous thrombosis, hepatic adenomas, breast changes, GI upset, headaches, and depression.  The patient verbalized understanding of the proper use and possible adverse effects of OCPs. All of the patient's questions and concerns were addressed. Hydroxychloroquine Pregnancy And Lactation Text: This medication has been shown to cause fetal harm but it isn't assigned a Pregnancy Risk Category. There are small amounts excreted in breast milk. Protopic Counseling: Patient may experience a mild burning sensation during topical application. Protopic is not approved in children less than 2 years of age. There have been case reports of hematologic and skin malignancies in patients using topical calcineurin inhibitors although causality is questionable. Rituxan Counseling:  I discussed with the patient the risks of Rituxan infusions. Side effects can include infusion reactions, severe drug rashes including mucocutaneous reactions, reactivation of latent hepatitis and other infections and rarely progressive multifocal leukoencephalopathy.  All of the patient's questions and concerns were addressed. Eucrisa Pregnancy And Lactation Text: This medication has not been assigned a Pregnancy Risk Category but animal studies failed to show danger with the topical medication. It is unknown if the medication is excreted in breast milk. Dupixent Pregnancy And Lactation Text: This medication likely crosses the placenta but the risk for the fetus is uncertain. This medication is excreted in breast milk. Valtrex Pregnancy And Lactation Text: this medication is Pregnancy Category B and is considered safe during pregnancy. This medication is not directly found in breast milk but it's metabolite acyclovir is present. Cyclosporine Counseling:  I discussed with the patient the risks of cyclosporine including but not limited to hypertension, gingival hyperplasia,myelosuppression, immunosuppression, liver damage, kidney damage, neurotoxicity, lymphoma, and serious infections. The patient understands that monitoring is required including baseline blood pressure, CBC, CMP, lipid panel and uric acid, and then 1-2 times monthly CMP and blood pressure. Cimetidine Counseling:  I discussed with the patient the risks of Cimetidine including but not limited to gynecomastia, headache, diarrhea, nausea, drowsiness, arrhythmias, pancreatitis, skin rashes, psychosis, bone marrow suppression and kidney toxicity. Doxycycline Counseling:  Patient counseled regarding possible photosensitivity and increased risk for sunburn.  Patient instructed to avoid sunlight, if possible.  When exposed to sunlight, patients should wear protective clothing, sunglasses, and sunscreen.  The patient was instructed to call the office immediately if the following severe adverse effects occur:  hearing changes, easy bruising/bleeding, severe headache, or vision changes.  The patient verbalized understanding of the proper use and possible adverse effects of doxycycline.  All of the patient's questions and concerns were addressed. Zyclara Counseling:  I discussed with the patient the risks of imiquimod including but not limited to erythema, scaling, itching, weeping, crusting, and pain.  Patient understands that the inflammatory response to imiquimod is variable from person to person and was educated regarded proper titration schedule.  If flu-like symptoms develop, patient knows to discontinue the medication and contact us. Bexarotene Counseling:  I discussed with the patient the risks of bexarotene including but not limited to hair loss, dry lips/skin/eyes, liver abnormalities, hyperlipidemia, pancreatitis, depression/suicidal ideation, photosensitivity, drug rash/allergic reactions, hypothyroidism, anemia, leukopenia, infection, cataracts, and teratogenicity.  Patient understands that they will need regular blood tests to check lipid profile, liver function tests, white blood cell count, thyroid function tests and pregnancy test if applicable. Skyrizi Pregnancy And Lactation Text: The risk during pregnancy and breastfeeding is uncertain with this medication. Ivermectin Counseling:  Patient instructed to take medication on an empty stomach with a full glass of water.  Patient informed of potential adverse effects including but not limited to nausea, diarrhea, dizziness, itching, and swelling of the extremities or lymph nodes.  The patient verbalized understanding of the proper use and possible adverse effects of ivermectin.  All of the patient's questions and concerns were addressed. Stelara Counseling:  I discussed with the patient the risks of ustekinumab including but not limited to immunosuppression, malignancy, posterior leukoencephalopathy syndrome, and serious infections.  The patient understands that monitoring is required including a PPD at baseline and must alert us or the primary physician if symptoms of infection or other concerning signs are noted. Nsaids Counseling: NSAID Counseling: I discussed with the patient that NSAIDs should be taken with food. Prolonged use of NSAIDs can result in the development of stomach ulcers.  Patient advised to stop taking NSAIDs if abdominal pain occurs.  The patient verbalized understanding of the proper use and possible adverse effects of NSAIDs.  All of the patient's questions and concerns were addressed. Griseofulvin Counseling:  I discussed with the patient the risks of griseofulvin including but not limited to photosensitivity, cytopenia, liver damage, nausea/vomiting and severe allergy.  The patient understands that this medication is best absorbed when taken with a fatty meal (e.g., ice cream or french fries). Enbrel Counseling:  I discussed with the patient the risks of etanercept including but not limited to myelosuppression, immunosuppression, autoimmune hepatitis, demyelinating diseases, lymphoma, and infections.  The patient understands that monitoring is required including a PPD at baseline and must alert us or the primary physician if symptoms of infection or other concerning signs are noted. Rituxan Pregnancy And Lactation Text: This medication is Pregnancy Category C and it isn't know if it is safe during pregnancy. It is unknown if this medication is excreted in breast milk but similar antibodies are known to be excreted. Birth Control Pills Pregnancy And Lactation Text: This medication should be avoided if pregnant and for the first 30 days post-partum. Carac Pregnancy And Lactation Text: This medication is Pregnancy Category X and contraindicated in pregnancy and in women who may become pregnant. It is unknown if this medication is excreted in breast milk. Cyclosporine Pregnancy And Lactation Text: This medication is Pregnancy Category C and it isn't know if it is safe during pregnancy. This medication is excreted in breast milk. Dapsone Pregnancy And Lactation Text: This medication is Pregnancy Category C and is not considered safe during pregnancy or breast feeding. Protopic Pregnancy And Lactation Text: This medication is Pregnancy Category C. It is unknown if this medication is excreted in breast milk when applied topically. Hydroquinone Counseling:  Patient advised that medication may result in skin irritation, lightening (hypopigmentation), dryness, and burning.  In the event of skin irritation, the patient was advised to reduce the amount of the drug applied or use it less frequently.  Rarely, spots that are treated with hydroquinone can become darker (pseudoochronosis).  Should this occur, patient instructed to stop medication and call the office. The patient verbalized understanding of the proper use and possible adverse effects of hydroquinone.  All of the patient's questions and concerns were addressed. Methotrexate Counseling:  Patient counseled regarding adverse effects of methotrexate including but not limited to nausea, vomiting, abnormalities in liver function tests. Patients may develop mouth sores, rash, diarrhea, and abnormalities in blood counts. The patient understands that monitoring is required including LFT's and blood counts.  There is a rare possibility of scarring of the liver and lung problems that can occur when taking methotrexate. Persistent nausea, loss of appetite, pale stools, dark urine, cough, and shortness of breath should be reported immediately. Patient advised to discontinue methotrexate treatment at least three months before attempting to become pregnant.  I discussed the need for folate supplements while taking methotrexate.  These supplements can decrease side effects during methotrexate treatment. The patient verbalized understanding of the proper use and possible adverse effects of methotrexate.  All of the patient's questions and concerns were addressed. Solaraze Counseling:  I discussed with the patient the risks of Solaraze including but not limited to erythema, scaling, itching, weeping, crusting, and pain. Spironolactone Counseling: Patient advised regarding risks of diarrhea, abdominal pain, hyperkalemia, birth defects (for female patients), liver toxicity and renal toxicity. The patient may need blood work to monitor liver and kidney function and potassium levels while on therapy. The patient verbalized understanding of the proper use and possible adverse effects of spironolactone.  All of the patient's questions and concerns were addressed. Doxycycline Pregnancy And Lactation Text: This medication is Pregnancy Category D and not consider safe during pregnancy. It is also excreted in breast milk but is considered safe for shorter treatment courses. Rifampin Counseling: I discussed with the patient the risks of rifampin including but not limited to liver damage, kidney damage, red-orange body fluids, nausea/vomiting and severe allergy. Bexarotene Pregnancy And Lactation Text: This medication is Pregnancy Category X and should not be given to women who are pregnant or may become pregnant. This medication should not be used if you are breast feeding. Xolair Pregnancy And Lactation Text: This medication is Pregnancy Category B and is considered safe during pregnancy. This medication is excreted in breast milk. Rifampin Pregnancy And Lactation Text: This medication is Pregnancy Category C and it isn't know if it is safe during pregnancy. It is also excreted in breast milk and should not be used if you are breast feeding. Isotretinoin Counseling: Patient should get monthly blood tests, not donate blood, not drive at night if vision affected, not share medication, and not undergo elective surgery for 6 months after tx completed. Side effects reviewed, pt to contact office should one occur. Griseofulvin Pregnancy And Lactation Text: This medication is Pregnancy Category X and is known to cause serious birth defects. It is unknown if this medication is excreted in breast milk but breast feeding should be avoided. 5-Fu Counseling: 5-Fluorouracil Counseling:  I discussed with the patient the risks of 5-fluorouracil including but not limited to erythema, scaling, itching, weeping, crusting, and pain. Nsaids Pregnancy And Lactation Text: These medications are considered safe up to 30 weeks gestation. It is excreted in breast milk. Siliq Counseling:  I discussed with the patient the risks of Siliq including but not limited to new or worsening depression, suicidal thoughts and behavior, immunosuppression, malignancy, posterior leukoencephalopathy syndrome, and serious infections.  The patient understands that monitoring is required including a PPD at baseline and must alert us or the primary physician if symptoms of infection or other concerning signs are noted. There is also a special program designed to monitor depression which is required with Siliq. Erivedge Counseling- I discussed with the patient the risks of Erivedge including but not limited to nausea, vomiting, diarrhea, constipation, weight loss, changes in the sense of taste, decreased appetite, muscle spasms, and hair loss.  The patient verbalized understanding of the proper use and possible adverse effects of Erivedge.  All of the patient's questions and concerns were addressed. Solaraze Pregnancy And Lactation Text: This medication is Pregnancy Category B and is considered safe. There is some data to suggest avoiding during the third trimester. It is unknown if this medication is excreted in breast milk. Spironolactone Pregnancy And Lactation Text: This medication can cause feminization of the male fetus and should be avoided during pregnancy. The active metabolite is also found in breast milk. Imiquimod Counseling:  I discussed with the patient the risks of imiquimod including but not limited to erythema, scaling, itching, weeping, crusting, and pain.  Patient understands that the inflammatory response to imiquimod is variable from person to person and was educated regarded proper titration schedule.  If flu-like symptoms develop, patient knows to discontinue the medication and contact us. Odomzo Counseling- I discussed with the patient the risks of Odomzo including but not limited to nausea, vomiting, diarrhea, constipation, weight loss, changes in the sense of taste, decreased appetite, muscle spasms, and hair loss.  The patient verbalized understanding of the proper use and possible adverse effects of Odomzo.  All of the patient's questions and concerns were addressed. Doxepin Counseling:  Patient advised that the medication is sedating and not to drive a car after taking this medication. Patient informed of potential adverse effects including but not limited to dry mouth, urinary retention, and blurry vision.  The patient verbalized understanding of the proper use and possible adverse effects of doxepin.  All of the patient's questions and concerns were addressed. Azathioprine Counseling:  I discussed with the patient the risks of azathioprine including but not limited to myelosuppression, immunosuppression, hepatotoxicity, lymphoma, and infections.  The patient understands that monitoring is required including baseline LFTs, Creatinine, possible TPMP genotyping and weekly CBCs for the first month and then every 2 weeks thereafter.  The patient verbalized understanding of the proper use and possible adverse effects of azathioprine.  All of the patient's questions and concerns were addressed. Humira Counseling:  I discussed with the patient the risks of adalimumab including but not limited to myelosuppression, immunosuppression, autoimmune hepatitis, demyelinating diseases, lymphoma, and serious infections.  The patient understands that monitoring is required including a PPD at baseline and must alert us or the primary physician if symptoms of infection or other concerning signs are noted. Methotrexate Pregnancy And Lactation Text: This medication is Pregnancy Category X and is known to cause fetal harm. This medication is excreted in breast milk. Erythromycin Counseling:  I discussed with the patient the risks of erythromycin including but not limited to GI upset, allergic reaction, drug rash, diarrhea, increase in liver enzymes, and yeast infections. Isotretinoin Pregnancy And Lactation Text: This medication is Pregnancy Category X and is considered extremely dangerous during pregnancy. It is unknown if it is excreted in breast milk. Erythromycin Pregnancy And Lactation Text: This medication is Pregnancy Category B and is considered safe during pregnancy. It is also excreted in breast milk. Taltz Counseling: I discussed with the patient the risks of ixekizumab including but not limited to immunosuppression, serious infections, worsening of inflammatory bowel disease and drug reactions.  The patient understands that monitoring is required including a PPD at baseline and must alert us or the primary physician if symptoms of infection or other concerning signs are noted. Itraconazole Counseling:  I discussed with the patient the risks of itraconazole including but not limited to liver damage, nausea/vomiting, neuropathy, and severe allergy.  The patient understands that this medication is best absorbed when taken with acidic beverages such as non-diet cola or ginger ale.  The patient understands that monitoring is required including baseline LFTs and repeat LFTs at intervals.  The patient understands that they are to contact us or the primary physician if concerning signs are noted. Arava Counseling:  Patient counseled regarding adverse effects of Arava including but not limited to nausea, vomiting, abnormalities in liver function tests. Patients may develop mouth sores, rash, diarrhea, and abnormalities in blood counts. The patient understands that monitoring is required including LFTs and blood counts.  There is a rare possibility of scarring of the liver and lung problems that can occur when taking methotrexate. Persistent nausea, loss of appetite, pale stools, dark urine, cough, and shortness of breath should be reported immediately. Patient advised to discontinue Arava treatment and consult with a physician prior to attempting conception. The patient will have to undergo a treatment to eliminate Arava from the body prior to conception. Erivedge Pregnancy And Lactation Text: This medication is Pregnancy Category X and is absolutely contraindicated during pregnancy. It is unknown if it is excreted in breast milk. Gabapentin Counseling: I discussed with the patient the risks of gabapentin including but not limited to dizziness, somnolence, fatigue and ataxia. Simponi Counseling:  I discussed with the patient the risks of golimumab including but not limited to myelosuppression, immunosuppression, autoimmune hepatitis, demyelinating diseases, lymphoma, and serious infections.  The patient understands that monitoring is required including a PPD at baseline and must alert us or the primary physician if symptoms of infection or other concerning signs are noted. Drysol Counseling:  I discussed with the patient the risks of drysol/aluminum chloride including but not limited to skin rash, itching, irritation, burning. SSKI Counseling:  I discussed with the patient the risks of SSKI including but not limited to thyroid abnormalities, metallic taste, GI upset, fever, headache, acne, arthralgias, paraesthesias, lymphadenopathy, easy bleeding, arrhythmias, and allergic reaction. Prednisone Counseling:  I discussed with the patient the risks of prolonged use of prednisone including but not limited to weight gain, insomnia, osteoporosis, mood changes, diabetes, susceptibility to infection, glaucoma and high blood pressure.  In cases where prednisone use is prolonged, patients should be monitored with blood pressure checks, serum glucose levels and an eye exam.  Additionally, the patient may need to be placed on GI prophylaxis, PCP prophylaxis, and calcium and vitamin D supplementation and/or a bisphosphonate.  The patient verbalized understanding of the proper use and the possible adverse effects of prednisone.  All of the patient's questions and concerns were addressed. Azathioprine Pregnancy And Lactation Text: This medication is Pregnancy Category D and isn't considered safe during pregnancy. It is unknown if this medication is excreted in breast milk. Azithromycin Counseling:  I discussed with the patient the risks of azithromycin including but not limited to GI upset, allergic reaction, drug rash, diarrhea, and yeast infections. Topical Retinoid counseling:  Patient advised to apply a pea-sized amount only at bedtime and wait 30 minutes after washing their face before applying.  If too drying, patient may add a non-comedogenic moisturizer. The patient verbalized understanding of the proper use and possible adverse effects of retinoids.  All of the patient's questions and concerns were addressed. Cimzia Counseling:  I discussed with the patient the risks of Cimzia including but not limited to immunosuppression, allergic reactions and infections.  The patient understands that monitoring is required including a PPD at baseline and must alert us or the primary physician if symptoms of infection or other concerning signs are noted. Sarecycline Counseling: Patient advised regarding possible photosensitivity and discoloration of the teeth, skin, lips, tongue and gums.  Patient instructed to avoid sunlight, if possible.  When exposed to sunlight, patients should wear protective clothing, sunglasses, and sunscreen.  The patient was instructed to call the office immediately if the following severe adverse effects occur:  hearing changes, easy bruising/bleeding, severe headache, or vision changes.  The patient verbalized understanding of the proper use and possible adverse effects of sarecycline.  All of the patient's questions and concerns were addressed. Doxepin Pregnancy And Lactation Text: This medication is Pregnancy Category C and it isn't known if it is safe during pregnancy. It is also excreted in breast milk and breast feeding isn't recommended. Hydroxyzine Counseling: Patient advised that the medication is sedating and not to drive a car after taking this medication.  Patient informed of potential adverse effects including but not limited to dry mouth, urinary retention, and blurry vision.  The patient verbalized understanding of the proper use and possible adverse effects of hydroxyzine.  All of the patient's questions and concerns were addressed. Metronidazole Counseling:  I discussed with the patient the risks of metronidazole including but not limited to seizures, nausea/vomiting, a metallic taste in the mouth, nausea/vomiting and severe allergy. High Dose Vitamin A Counseling: Side effects reviewed, pt to contact office should one occur. Sarecycline Pregnancy And Lactation Text: This medication is Pregnancy Category D and not consider safe during pregnancy. It is also excreted in breast milk. Tremfya Counseling: I discussed with the patient the risks of guselkumab including but not limited to immunosuppression, serious infections, and drug reactions.  The patient understands that monitoring is required including a PPD at baseline and must alert us or the primary physician if symptoms of infection or other concerning signs are noted. Clofazimine Counseling:  I discussed with the patient the risks of clofazimine including but not limited to skin and eye pigmentation, liver damage, nausea/vomiting, gastrointestinal bleeding and allergy. Otezla Counseling: The side effects of Otezla were discussed with the patient, including but not limited to worsening or new depression, weight loss, diarrhea, nausea, upper respiratory tract infection, and headache. Patient instructed to call the office should any adverse effect occur.  The patient verbalized understanding of the proper use and possible adverse effects of Otezla.  All the patient's questions and concerns were addressed. Ketoconazole Counseling:   Patient counseled regarding improving absorption with orange juice.  Adverse effects include but are not limited to breast enlargement, headache, diarrhea, nausea, upset stomach, liver function test abnormalities, taste disturbance, and stomach pain.  There is a rare possibility of liver failure that can occur when taking ketoconazole. The patient understands that monitoring of LFTs may be required, especially at baseline. The patient verbalized understanding of the proper use and possible adverse effects of ketoconazole.  All of the patient's questions and concerns were addressed. Ilumya Counseling: I discussed with the patient the risks of tildrakizumab including but not limited to immunosuppression, malignancy, posterior leukoencephalopathy syndrome, and serious infections.  The patient understands that monitoring is required including a PPD at baseline and must alert us or the primary physician if symptoms of infection or other concerning signs are noted. Drysol Pregnancy And Lactation Text: This medication is considered safe during pregnancy and breast feeding. Cimzia Pregnancy And Lactation Text: This medication crosses the placenta but can be considered safe in certain situations. Cimzia may be excreted in breast milk. Sski Pregnancy And Lactation Text: This medication is Pregnancy Category D and isn't considered safe during pregnancy. It is excreted in breast milk. Azithromycin Pregnancy And Lactation Text: This medication is considered safe during pregnancy and is also secreted in breast milk. Minoxidil Counseling: Minoxidil is a topical medication which can increase blood flow where it is applied. It is uncertain how this medication increases hair growth. Side effects are uncommon and include stinging and allergic reactions. Cellcept Counseling:  I discussed with the patient the risks of mycophenolate mofetil including but not limited to infection/immunosuppression, GI upset, hypokalemia, hypercholesterolemia, bone marrow suppression, lymphoproliferative disorders, malignancy, GI ulceration/bleed/perforation, colitis, interstitial lung disease, kidney failure, progressive multifocal leukoencephalopathy, and birth defects.  The patient understands that monitoring is required including a baseline creatinine and regular CBC testing. In addition, patient must alert us immediately if symptoms of infection or other concerning signs are noted. Hydroxyzine Pregnancy And Lactation Text: This medication is not safe during pregnancy and should not be taken. It is also excreted in breast milk and breast feeding isn't recommended. Cosentyx Counseling:  I discussed with the patient the risks of Cosentyx including but not limited to worsening of Crohn's disease, immunosuppression, allergic reactions and infections.  The patient understands that monitoring is required including a PPD at baseline and must alert us or the primary physician if symptoms of infection or other concerning signs are noted. Detail Level: Detailed Cephalexin Counseling: I counseled the patient regarding use of cephalexin as an antibiotic for prophylactic and/or therapeutic purposes. Cephalexin (commonly prescribed under brand name Keflex) is a cephalosporin antibiotic which is active against numerous classes of bacteria, including most skin bacteria. Side effects may include nausea, diarrhea, gastrointestinal upset, rash, hives, yeast infections, and in rare cases, hepatitis, kidney disease, seizures, fever, confusion, neurologic symptoms, and others. Patients with severe allergies to penicillin medications are cautioned that there is about a 10% incidence of cross-reactivity with cephalosporins. When possible, patients with penicillin allergies should use alternatives to cephalosporins for antibiotic therapy. Metronidazole Pregnancy And Lactation Text: This medication is Pregnancy Category B and considered safe during pregnancy.  It is also excreted in breast milk. Tetracycline Counseling: Patient counseled regarding possible photosensitivity and increased risk for sunburn.  Patient instructed to avoid sunlight, if possible.  When exposed to sunlight, patients should wear protective clothing, sunglasses, and sunscreen.  The patient was instructed to call the office immediately if the following severe adverse effects occur:  hearing changes, easy bruising/bleeding, severe headache, or vision changes.  The patient verbalized understanding of the proper use and possible adverse effects of tetracycline.  All of the patient's questions and concerns were addressed. Patient understands to avoid pregnancy while on therapy due to potential birth defects. High Dose Vitamin A Pregnancy And Lactation Text: High dose vitamin A therapy is contraindicated during pregnancy and breast feeding. Ketoconazole Pregnancy And Lactation Text: This medication is Pregnancy Category C and it isn't know if it is safe during pregnancy. It is also excreted in breast milk and breast feeding isn't recommended. Thalidomide Counseling: I discussed with the patient the risks of thalidomide including but not limited to birth defects, anxiety, weakness, chest pain, dizziness, cough and severe allergy. Tazorac Counseling:  Patient advised that medication is irritating and drying.  Patient may need to apply sparingly and wash off after an hour before eventually leaving it on overnight.  The patient verbalized understanding of the proper use and possible adverse effects of tazorac.  All of the patient's questions and concerns were addressed. Bactrim Counseling:  I discussed with the patient the risks of sulfa antibiotics including but not limited to GI upset, allergic reaction, drug rash, diarrhea, dizziness, photosensitivity, and yeast infections.  Rarely, more serious reactions can occur including but not limited to aplastic anemia, agranulocytosis, methemoglobinemia, blood dyscrasias, liver or kidney failure, lung infiltrates or desquamative/blistering drug rashes. Elidel Counseling: Patient may experience a mild burning sensation during topical application. Elidel is not approved in children less than 2 years of age. There have been case reports of hematologic and skin malignancies in patients using topical calcineurin inhibitors although causality is questionable. Otezla Pregnancy And Lactation Text: This medication is Pregnancy Category C and it isn't known if it is safe during pregnancy. It is unknown if it is excreted in breast milk. Skyrizi Counseling: I discussed with the patient the risks of risankizumab-rzaa including but not limited to immunosuppression, and serious infections.  The patient understands that monitoring is required including a PPD at baseline and must alert us or the primary physician if symptoms of infection or other concerning signs are noted. Glycopyrrolate Counseling:  I discussed with the patient the risks of glycopyrrolate including but not limited to skin rash, drowsiness, dry mouth, difficulty urinating, and blurred vision. Cyclophosphamide Counseling:  I discussed with the patient the risks of cyclophosphamide including but not limited to hair loss, hormonal abnormalities, decreased fertility, abdominal pain, diarrhea, nausea and vomiting, bone marrow suppression and infection. The patient understands that monitoring is required while taking this medication. Tazorac Pregnancy And Lactation Text: This medication is not safe during pregnancy. It is unknown if this medication is excreted in breast milk. Bactrim Pregnancy And Lactation Text: This medication is Pregnancy Category D and is known to cause fetal risk.  It is also excreted in breast milk. Picato Counseling:  I discussed with the patient the risks of Picato including but not limited to erythema, scaling, itching, weeping, crusting, and pain. Oxybutynin Counseling:  I discussed with the patient the risks of oxybutynin including but not limited to skin rash, drowsiness, dry mouth, difficulty urinating, and blurred vision. Cephalexin Pregnancy And Lactation Text: This medication is Pregnancy Category B and considered safe during pregnancy.  It is also excreted in breast milk but can be used safely for shorter doses. Minocycline Counseling: Patient advised regarding possible photosensitivity and discoloration of the teeth, skin, lips, tongue and gums.  Patient instructed to avoid sunlight, if possible.  When exposed to sunlight, patients should wear protective clothing, sunglasses, and sunscreen.  The patient was instructed to call the office immediately if the following severe adverse effects occur:  hearing changes, easy bruising/bleeding, severe headache, or vision changes.  The patient verbalized understanding of the proper use and possible adverse effects of minocycline.  All of the patient's questions and concerns were addressed. Acitretin Counseling:  I discussed with the patient the risks of acitretin including but not limited to hair loss, dry lips/skin/eyes, liver damage, hyperlipidemia, depression/suicidal ideation, photosensitivity.  Serious rare side effects can include but are not limited to pancreatitis, pseudotumor cerebri, bony changes, clot formation/stroke/heart attack.  Patient understands that alcohol is contraindicated since it can result in liver toxicity and significantly prolong the elimination of the drug by many years. Xeljanz Counseling: I discussed with the patient the risks of Xeljanz therapy including increased risk of infection, liver issues, headache, diarrhea, or cold symptoms. Live vaccines should be avoided. They were instructed to call if they have any problems. Albendazole Counseling:  I discussed with the patient the risks of albendazole including but not limited to cytopenia, kidney damage, nausea/vomiting and severe allergy.  The patient understands that this medication is being used in an off-label manner. Terbinafine Counseling: Patient counseling regarding adverse effects of terbinafine including but not limited to headache, diarrhea, rash, upset stomach, liver function test abnormalities, itching, taste/smell disturbance, nausea, abdominal pain, and flatulence.  There is a rare possibility of liver failure that can occur when taking terbinafine.  The patient understands that a baseline LFT and kidney function test may be required. The patient verbalized understanding of the proper use and possible adverse effects of terbinafine.  All of the patient's questions and concerns were addressed. Infliximab Counseling:  I discussed with the patient the risks of infliximab including but not limited to myelosuppression, immunosuppression, autoimmune hepatitis, demyelinating diseases, lymphoma, and serious infections.  The patient understands that monitoring is required including a PPD at baseline and must alert us or the primary physician if symptoms of infection or other concerning signs are noted. Benzoyl Peroxide Counseling: Patient counseled that medicine may cause skin irritation and bleach clothing.  In the event of skin irritation, the patient was advised to reduce the amount of the drug applied or use it less frequently.   The patient verbalized understanding of the proper use and possible adverse effects of benzoyl peroxide.  All of the patient's questions and concerns were addressed. Glycopyrrolate Pregnancy And Lactation Text: This medication is Pregnancy Category B and is considered safe during pregnancy. It is unknown if it is excreted breast milk. Colchicine Counseling:  Patient counseled regarding adverse effects including but not limited to stomach upset (nausea, vomiting, stomach pain, or diarrhea).  Patient instructed to limit alcohol consumption while taking this medication.  Colchicine may reduce blood counts especially with prolonged use.  The patient understands that monitoring of kidney function and blood counts may be required, especially at baseline. The patient verbalized understanding of the proper use and possible adverse effects of colchicine.  All of the patient's questions and concerns were addressed.

## 2022-10-16 NOTE — ED PROVIDER NOTE - PHYSICAL EXAMINATION
Gen: no acute distress  Head: normocephalic, atraumatic  EENT: EOMI  Lung: no increased work of breathing, CTABL, no wheezing  MSK: no visible deformities, full range of motion in all 4 extremities  Neuro: A&Ox4; No focal neurologic deficits

## 2022-10-16 NOTE — ED ADULT NURSE REASSESSMENT NOTE - NS ED NURSE REASSESS COMMENT FT1
pt is resting in hallway, awaiting chest xray, pt updated on plan of care. respirations even and unlabored. pt shows no s/s of acute distress at this time. safety precautions maintained.

## 2023-01-25 ENCOUNTER — INPATIENT (INPATIENT)
Facility: HOSPITAL | Age: 28
LOS: 15 days | Discharge: HOME | DRG: 750 | End: 2023-02-10
Attending: PSYCHIATRY & NEUROLOGY | Admitting: PSYCHIATRY & NEUROLOGY
Payer: MEDICAID

## 2023-01-25 VITALS — WEIGHT: 181 LBS | OXYGEN SATURATION: 99 % | RESPIRATION RATE: 18 BRPM | TEMPERATURE: 98 F | HEIGHT: 73 IN

## 2023-01-25 DIAGNOSIS — G47.00 INSOMNIA, UNSPECIFIED: ICD-10-CM

## 2023-01-25 DIAGNOSIS — E55.9 VITAMIN D DEFICIENCY, UNSPECIFIED: ICD-10-CM

## 2023-01-25 DIAGNOSIS — F43.10 POST-TRAUMATIC STRESS DISORDER, UNSPECIFIED: ICD-10-CM

## 2023-01-25 DIAGNOSIS — Z59.00 HOMELESSNESS UNSPECIFIED: ICD-10-CM

## 2023-01-25 DIAGNOSIS — F29 UNSPECIFIED PSYCHOSIS NOT DUE TO A SUBSTANCE OR KNOWN PHYSIOLOGICAL CONDITION: ICD-10-CM

## 2023-01-25 DIAGNOSIS — J98.11 ATELECTASIS: ICD-10-CM

## 2023-01-25 DIAGNOSIS — F25.1 SCHIZOAFFECTIVE DISORDER, DEPRESSIVE TYPE: ICD-10-CM

## 2023-01-25 DIAGNOSIS — F22 DELUSIONAL DISORDERS: ICD-10-CM

## 2023-01-25 DIAGNOSIS — R94.31 ABNORMAL ELECTROCARDIOGRAM [ECG] [EKG]: ICD-10-CM

## 2023-01-25 DIAGNOSIS — F12.10 CANNABIS ABUSE, UNCOMPLICATED: ICD-10-CM

## 2023-01-25 DIAGNOSIS — R73.03 PREDIABETES: ICD-10-CM

## 2023-01-25 DIAGNOSIS — F31.5 BIPOLAR DISORDER, CURRENT EPISODE DEPRESSED, SEVERE, WITH PSYCHOTIC FEATURES: ICD-10-CM

## 2023-01-25 PROCEDURE — 83880 ASSAY OF NATRIURETIC PEPTIDE: CPT

## 2023-01-25 PROCEDURE — 71045 X-RAY EXAM CHEST 1 VIEW: CPT

## 2023-01-25 PROCEDURE — 82746 ASSAY OF FOLIC ACID SERUM: CPT

## 2023-01-25 PROCEDURE — 83735 ASSAY OF MAGNESIUM: CPT

## 2023-01-25 PROCEDURE — 82140 ASSAY OF AMMONIA: CPT

## 2023-01-25 PROCEDURE — 83036 HEMOGLOBIN GLYCOSYLATED A1C: CPT

## 2023-01-25 PROCEDURE — 85025 COMPLETE CBC W/AUTO DIFF WBC: CPT

## 2023-01-25 PROCEDURE — 36415 COLL VENOUS BLD VENIPUNCTURE: CPT

## 2023-01-25 PROCEDURE — 84443 ASSAY THYROID STIM HORMONE: CPT

## 2023-01-25 PROCEDURE — 82607 VITAMIN B-12: CPT

## 2023-01-25 PROCEDURE — 80164 ASSAY DIPROPYLACETIC ACD TOT: CPT

## 2023-01-25 PROCEDURE — 80061 LIPID PANEL: CPT

## 2023-01-25 PROCEDURE — 84484 ASSAY OF TROPONIN QUANT: CPT

## 2023-01-25 PROCEDURE — 80053 COMPREHEN METABOLIC PANEL: CPT

## 2023-01-25 PROCEDURE — 86140 C-REACTIVE PROTEIN: CPT

## 2023-01-25 PROCEDURE — 70450 CT HEAD/BRAIN W/O DYE: CPT

## 2023-01-25 PROCEDURE — 93005 ELECTROCARDIOGRAM TRACING: CPT

## 2023-01-25 PROCEDURE — 82306 VITAMIN D 25 HYDROXY: CPT

## 2023-01-25 PROCEDURE — 82550 ASSAY OF CK (CPK): CPT

## 2023-01-25 PROCEDURE — 99223 1ST HOSP IP/OBS HIGH 75: CPT

## 2023-01-25 PROCEDURE — 84100 ASSAY OF PHOSPHORUS: CPT

## 2023-01-25 RX ORDER — HALOPERIDOL DECANOATE 100 MG/ML
5 INJECTION INTRAMUSCULAR ONCE
Refills: 0 | Status: DISCONTINUED | OUTPATIENT
Start: 2023-01-25 | End: 2023-02-10

## 2023-01-25 RX ORDER — HALOPERIDOL DECANOATE 100 MG/ML
2 INJECTION INTRAMUSCULAR EVERY 6 HOURS
Refills: 0 | Status: DISCONTINUED | OUTPATIENT
Start: 2023-01-25 | End: 2023-02-10

## 2023-01-25 RX ORDER — RISPERIDONE 4 MG/1
0.5 TABLET ORAL
Refills: 0 | Status: DISCONTINUED | OUTPATIENT
Start: 2023-01-25 | End: 2023-01-25

## 2023-01-25 RX ORDER — TRAZODONE HCL 50 MG
50 TABLET ORAL AT BEDTIME
Refills: 0 | Status: DISCONTINUED | OUTPATIENT
Start: 2023-01-25 | End: 2023-02-09

## 2023-01-25 RX ORDER — HALOPERIDOL DECANOATE 100 MG/ML
5 INJECTION INTRAMUSCULAR
Refills: 0 | Status: DISCONTINUED | OUTPATIENT
Start: 2023-01-25 | End: 2023-01-26

## 2023-01-25 RX ADMIN — HALOPERIDOL DECANOATE 5 MILLIGRAM(S): 100 INJECTION INTRAMUSCULAR at 20:53

## 2023-01-25 SDOH — ECONOMIC STABILITY - HOUSING INSECURITY: HOMELESSNESS UNSPECIFIED: Z59.00

## 2023-01-25 NOTE — BH INPATIENT PSYCHIATRY ASSESSMENT NOTE - NSBHTIMEACTIVITIESPERFORMED_PSY_A_CORE
1. interviewed the pt to assess current mental status  2. reviewed medications and increased the haldol to 5mg po tid   3. reviewed lab results and some results pending  4. conferred with social work concerning pt with being a poor historian , most of background information is lacking

## 2023-01-25 NOTE — BH INPATIENT PSYCHIATRY ASSESSMENT NOTE - NSBHASSESSSUMMFT_PSY_ALL_CORE
26 y/o single male, homeless, unemployed, past psychiatric hx pf Schizophrenia/SAD, PTSD, Cannabis abuse, no prior SA, no violence, no medical issues was BIB/Self due to increasing paranoia.     Patient presented to [facility] [place], on arrival he was oddly related, guarded, unable to elaborate linear events prior to the hospital. Patient reports that he is "depressed" and "emotions are bad" and that he is "lagging" and "breaking out" . Patient states that he feels more paranoid and feels "danger and eeriness". Reports that he is pacing and walking prior to presenting to the hospital. There is notable latency to his answers and suspect that pt. is internally stimulated as he presents distracted. When asked about a/h replies "normally yeah" and does not answer when asked about now. Denies v/H, denies HI. he smokes 1 cigarette a day. uses Cannabis regularly, last use yesterday. He denies Alcohol or other substances. Denies on any Psychiatric medications or being engaged in any Psychiatric care. Reports he has no family/friends that we can reach.    Plan: To start Risperdal 0.5 mg BID           PRN Meds  28 y/o single male, homeless, unemployed, past psychiatric hx pf Schizophrenia/SAD, PTSD, Cannabis abuse, no prior SA, no violence, no medical issues was BIB/Self due to increasing paranoia.     [place] [facility] [place] he was oddly related, guarded, unable to elaborate linear events prior to the hospital. Patient reports that he is "depressed" and "emotions are bad" and that he is "lagging" and "breaking out" . Patient states that he feels more paranoid and feels "danger and eeriness". Reports that he is pacing and walking prior to presenting to the hospital. There is notable latency to his answers and suspect that pt. is internally stimulated as he presents distracted. When asked about a/h replies "normally yeah" and does not answer when asked about now. Denies v/H, denies HI. he smokes 1 cigarette a day. uses Cannabis regularly, last use yesterday. He denies Alcohol or other substances. Denies on any Psychiatric medications or being engaged in any Psychiatric care. Reports he has no family/friends that we can reach.    Pt refusing to get out of bed today. Pt with paranoid delusions and appears wary about getting out of bed and was staying under the bed covers.  Pt was seen talking and gesturing to the window

## 2023-01-25 NOTE — BH INPATIENT PSYCHIATRY ASSESSMENT NOTE - CURRENT MEDICATION
MEDICATIONS  (STANDING):    MEDICATIONS  (PRN):   MEDICATIONS  (STANDING):  haloperidol     Tablet 5 milliGRAM(s) Oral two times a day  haloperidol    Injectable 5 milliGRAM(s) IntraMuscular once    MEDICATIONS  (PRN):  haloperidol     Tablet 2 milliGRAM(s) Oral every 6 hours PRN Mod Agitation  LORazepam     Tablet 1 milliGRAM(s) Oral every 6 hours PRN Mod Agitation  traZODone 50 milliGRAM(s) Oral at bedtime PRN insomnia

## 2023-01-25 NOTE — BH INPATIENT PSYCHIATRY ASSESSMENT NOTE - HPI (INCLUDE ILLNESS QUALITY, SEVERITY, DURATION, TIMING, CONTEXT, MODIFYING FACTORS, ASSOCIATED SIGNS AND SYMPTOMS)
28 y/o single male, homeless, unemployed, past psychiatric hx pf Schizophrenia/SAD, PTSD, Cannabis abuse, no prior SA, no violence, no medical issues was BIB/Self due to increasing paranoia.     Patient presented to [facility] [place], on arrival he was oddly related, guarded, unable to elaborate linear events prior to the hospital. Patient reports that he is "depressed" and "emotions are bad" and that he is "lagging" and "breaking out" . Patient states that he feels more paranoid and feels "danger and eeriness". Reports that he is pacing and walking prior to presenting to the hospital. There is notable latency to his answers and suspect that pt. is internally stimulated as he presents distracted. When asked about a/h replies "normally yeah" and does not answer when asked about now. Denies v/H, denies HI. he smokes 1 cigarette a day. uses Cannabis regularly, last use yesterday. He denies Alcohol or other substances. Denies on any Psychiatric medications or being engaged in any Psychiatric care. Reports he has no family/friends that we can reach.

## 2023-01-25 NOTE — BH INPATIENT PSYCHIATRY ASSESSMENT NOTE - OTHER PAST PSYCHIATRIC HISTORY (INCLUDE DETAILS REGARDING ONSET, COURSE OF ILLNESS, INPATIENT/OUTPATIENT TREATMENT)
See HPI-Multiple past Psychiatric Admission at [facility]. he has been voluntarily admitted in the past for similar presentation.

## 2023-01-25 NOTE — BH INPATIENT PSYCHIATRY ASSESSMENT NOTE - NSTXDCOTHRGOAL_PSY_ALL_CORE
Patient will be referred to the appropriate level of outpatient treatment and have appointments within 3-5 days of discharge.  Patient will be discharged to safe housing either back home or DSS emergency housing.  Patient will be seen by the medicaid specialist for benefits if needed   will explore need for duel diagnosis tx with appointments if needed.

## 2023-01-25 NOTE — BH INPATIENT PSYCHIATRY ASSESSMENT NOTE - NSBHCHARTREVIEWVS_PSY_A_CORE FT
Vital Signs Last 24 Hrs  T(C): --  T(F): --  HR: --  BP: --  BP(mean): --  RR: --  SpO2: --     Vital Signs Last 24 Hrs  T(C): 36.6 (01-25-23 @ 15:42), Max: 36.6 (01-25-23 @ 15:42)  T(F): 97.8 (01-25-23 @ 15:42), Max: 97.8 (01-25-23 @ 15:42)  HR: --  BP: --  BP(mean): --  RR: 18 (01-25-23 @ 15:42) (18 - 18)  SpO2: 99% (01-25-23 @ 15:42) (99% - 99%)    Orthostatic VS  01-25-23 @ 15:42  Lying BP: --/-- HR: --  Sitting BP: 123/79 HR: 69  Standing BP: 142/87 HR: 74  Site: upper left arm  Mode: electronic   Vital Signs Last 24 Hrs  T(C): 36.3 (01-26-23 @ 07:48), Max: 36.6 (01-25-23 @ 15:42)  T(F): 97.3 (01-26-23 @ 07:48), Max: 97.8 (01-25-23 @ 15:42)  HR: --  BP: --  BP(mean): --  RR: 16 (01-26-23 @ 07:48) (16 - 18)  SpO2: 100% (01-26-23 @ 07:48) (99% - 100%)    Orthostatic VS  01-26-23 @ 07:48  Lying BP: --/-- HR: --  Sitting BP: 116/6 HR: 50  Standing BP: 120/85 HR: 70  Site: upper right arm  Mode: electronic  Orthostatic VS  01-25-23 @ 15:42  Lying BP: --/-- HR: --  Sitting BP: 123/79 HR: 69  Standing BP: 142/87 HR: 74  Site: upper left arm  Mode: electronic

## 2023-01-25 NOTE — BH INPATIENT PSYCHIATRY ASSESSMENT NOTE - PAST PSYCHOTROPIC MEDICATION
Risperdal; Depakote; Seroquel; Invega Sustenna . he had a bead reaction to Seroquel, feels tired andf

## 2023-01-25 NOTE — BH INPATIENT PSYCHIATRY ASSESSMENT NOTE - NSTREATMENTCERTY_PSY_ALL_CORE

## 2023-01-26 LAB
A1C WITH ESTIMATED AVERAGE GLUCOSE RESULT: 5.7 % — HIGH (ref 4–5.6)
ALBUMIN SERPL ELPH-MCNC: 3.4 G/DL — SIGNIFICANT CHANGE UP (ref 3.3–5)
ALP SERPL-CCNC: 68 U/L — SIGNIFICANT CHANGE UP (ref 40–120)
ALT FLD-CCNC: 29 U/L — SIGNIFICANT CHANGE UP (ref 12–78)
ANION GAP SERPL CALC-SCNC: 3 MMOL/L — LOW (ref 5–17)
AST SERPL-CCNC: 25 U/L — SIGNIFICANT CHANGE UP (ref 15–37)
BASOPHILS # BLD AUTO: 0.02 K/UL — SIGNIFICANT CHANGE UP (ref 0–0.2)
BASOPHILS NFR BLD AUTO: 0.4 % — SIGNIFICANT CHANGE UP (ref 0–2)
BILIRUB SERPL-MCNC: 0.6 MG/DL — SIGNIFICANT CHANGE UP (ref 0.2–1.2)
BUN SERPL-MCNC: 16 MG/DL — SIGNIFICANT CHANGE UP (ref 7–23)
CALCIUM SERPL-MCNC: 8.9 MG/DL — SIGNIFICANT CHANGE UP (ref 8.5–10.1)
CHLORIDE SERPL-SCNC: 110 MMOL/L — HIGH (ref 96–108)
CHOLEST SERPL-MCNC: 154 MG/DL — SIGNIFICANT CHANGE UP
CO2 SERPL-SCNC: 26 MMOL/L — SIGNIFICANT CHANGE UP (ref 22–31)
CREAT SERPL-MCNC: 0.74 MG/DL — SIGNIFICANT CHANGE UP (ref 0.5–1.3)
EGFR: 128 ML/MIN/1.73M2 — SIGNIFICANT CHANGE UP
EOSINOPHIL # BLD AUTO: 0.34 K/UL — SIGNIFICANT CHANGE UP (ref 0–0.5)
EOSINOPHIL NFR BLD AUTO: 7.5 % — HIGH (ref 0–6)
ESTIMATED AVERAGE GLUCOSE: 117 MG/DL — HIGH (ref 68–114)
GLUCOSE SERPL-MCNC: 95 MG/DL — SIGNIFICANT CHANGE UP (ref 70–99)
HCT VFR BLD CALC: 45.4 % — SIGNIFICANT CHANGE UP (ref 39–50)
HDLC SERPL-MCNC: 49 MG/DL — SIGNIFICANT CHANGE UP
HGB BLD-MCNC: 15 G/DL — SIGNIFICANT CHANGE UP (ref 13–17)
IMM GRANULOCYTES NFR BLD AUTO: 0.2 % — SIGNIFICANT CHANGE UP (ref 0–0.9)
LIPID PNL WITH DIRECT LDL SERPL: 90 MG/DL — SIGNIFICANT CHANGE UP
LYMPHOCYTES # BLD AUTO: 2 K/UL — SIGNIFICANT CHANGE UP (ref 1–3.3)
LYMPHOCYTES # BLD AUTO: 44.1 % — HIGH (ref 13–44)
MCHC RBC-ENTMCNC: 28.4 PG — SIGNIFICANT CHANGE UP (ref 27–34)
MCHC RBC-ENTMCNC: 33 GM/DL — SIGNIFICANT CHANGE UP (ref 32–36)
MCV RBC AUTO: 85.8 FL — SIGNIFICANT CHANGE UP (ref 80–100)
MONOCYTES # BLD AUTO: 0.35 K/UL — SIGNIFICANT CHANGE UP (ref 0–0.9)
MONOCYTES NFR BLD AUTO: 7.7 % — SIGNIFICANT CHANGE UP (ref 2–14)
NEUTROPHILS # BLD AUTO: 1.82 K/UL — SIGNIFICANT CHANGE UP (ref 1.8–7.4)
NEUTROPHILS NFR BLD AUTO: 40.1 % — LOW (ref 43–77)
NON HDL CHOLESTEROL: 106 MG/DL — SIGNIFICANT CHANGE UP
PLATELET # BLD AUTO: 229 K/UL — SIGNIFICANT CHANGE UP (ref 150–400)
POTASSIUM SERPL-MCNC: 3.9 MMOL/L — SIGNIFICANT CHANGE UP (ref 3.5–5.3)
POTASSIUM SERPL-SCNC: 3.9 MMOL/L — SIGNIFICANT CHANGE UP (ref 3.5–5.3)
PROT SERPL-MCNC: 6.5 GM/DL — SIGNIFICANT CHANGE UP (ref 6–8.3)
RBC # BLD: 5.29 M/UL — SIGNIFICANT CHANGE UP (ref 4.2–5.8)
RBC # FLD: 13.8 % — SIGNIFICANT CHANGE UP (ref 10.3–14.5)
SODIUM SERPL-SCNC: 139 MMOL/L — SIGNIFICANT CHANGE UP (ref 135–145)
TRIGL SERPL-MCNC: 79 MG/DL — SIGNIFICANT CHANGE UP
TSH SERPL-MCNC: 1.08 UU/ML — SIGNIFICANT CHANGE UP (ref 0.34–4.82)
WBC # BLD: 4.54 K/UL — SIGNIFICANT CHANGE UP (ref 3.8–10.5)
WBC # FLD AUTO: 4.54 K/UL — SIGNIFICANT CHANGE UP (ref 3.8–10.5)

## 2023-01-26 PROCEDURE — 93010 ELECTROCARDIOGRAM REPORT: CPT

## 2023-01-26 RX ORDER — HALOPERIDOL DECANOATE 100 MG/ML
5 INJECTION INTRAMUSCULAR THREE TIMES A DAY
Refills: 0 | Status: DISCONTINUED | OUTPATIENT
Start: 2023-01-26 | End: 2023-01-30

## 2023-01-26 RX ORDER — BENZTROPINE MESYLATE 1 MG
0.5 TABLET ORAL THREE TIMES A DAY
Refills: 0 | Status: DISCONTINUED | OUTPATIENT
Start: 2023-01-26 | End: 2023-02-10

## 2023-01-26 RX ADMIN — Medication 0.5 MILLIGRAM(S): at 20:49

## 2023-01-26 RX ADMIN — HALOPERIDOL DECANOATE 5 MILLIGRAM(S): 100 INJECTION INTRAMUSCULAR at 16:59

## 2023-01-26 RX ADMIN — Medication 50 MILLIGRAM(S): at 20:49

## 2023-01-26 RX ADMIN — HALOPERIDOL DECANOATE 5 MILLIGRAM(S): 100 INJECTION INTRAMUSCULAR at 20:49

## 2023-01-26 RX ADMIN — HALOPERIDOL DECANOATE 5 MILLIGRAM(S): 100 INJECTION INTRAMUSCULAR at 09:52

## 2023-01-26 NOTE — BH SOCIAL WORK INITIAL PSYCHOSOCIAL EVALUATION - NSBHCHILDEVENTS_PSY_ALL_CORE
Per EMR, pt grew up living with his Mother. Father minimally involved in his life. Pt has many siblings from both sides of his family. Per EMR, pt grew up living with his Mother. Father minimally involved in his life. Pt has many siblings from both sides of his family. Per EMR, pt's aunt has been a support system for pt but unknown if she is currently involved in pt's life.

## 2023-01-26 NOTE — BH SOCIAL WORK INITIAL PSYCHOSOCIAL EVALUATION - NSPROGENSOURCEINFOFT_PSY_ALL_CORE
EMR EMR, SW attempted to meet with pt numerous times but pt was sleeping and did not want to participate in assessment.

## 2023-01-26 NOTE — BH SOCIAL WORK INITIAL PSYCHOSOCIAL EVALUATION - NSHIGHRISKBEHFT_PSY_ALL_CORE
Per EMR, "smokes 1 cigarette a day. uses Cannabis regularly. Multiple past Psychiatric Admission at , [facility]. he has been voluntarily admitted in the past for similar presentation".  Per EMR, "smokes 1 cigarette a day. uses Cannabis regularly. Multiple past Psychiatric Admission at , [facility]. he has been voluntarily admitted in the past for similar presentation".   Pt admitted x2 to  in 2018.

## 2023-01-27 DIAGNOSIS — F31.9 BIPOLAR DISORDER, UNSPECIFIED: ICD-10-CM

## 2023-01-27 PROCEDURE — 99233 SBSQ HOSP IP/OBS HIGH 50: CPT

## 2023-01-27 RX ORDER — DIVALPROEX SODIUM 500 MG/1
500 TABLET, DELAYED RELEASE ORAL AT BEDTIME
Refills: 0 | Status: DISCONTINUED | OUTPATIENT
Start: 2023-01-27 | End: 2023-01-30

## 2023-01-27 RX ADMIN — Medication 0.5 MILLIGRAM(S): at 09:25

## 2023-01-27 RX ADMIN — HALOPERIDOL DECANOATE 5 MILLIGRAM(S): 100 INJECTION INTRAMUSCULAR at 09:25

## 2023-01-27 RX ADMIN — HALOPERIDOL DECANOATE 5 MILLIGRAM(S): 100 INJECTION INTRAMUSCULAR at 12:40

## 2023-01-27 RX ADMIN — DIVALPROEX SODIUM 500 MILLIGRAM(S): 500 TABLET, DELAYED RELEASE ORAL at 21:38

## 2023-01-27 RX ADMIN — Medication 0.5 MILLIGRAM(S): at 21:37

## 2023-01-27 RX ADMIN — HALOPERIDOL DECANOATE 5 MILLIGRAM(S): 100 INJECTION INTRAMUSCULAR at 21:37

## 2023-01-27 RX ADMIN — Medication 50 MILLIGRAM(S): at 22:55

## 2023-01-27 RX ADMIN — Medication 0.5 MILLIGRAM(S): at 12:40

## 2023-01-27 NOTE — BH INPATIENT PSYCHIATRY PROGRESS NOTE - NSBHCHARTREVIEWVS_PSY_A_CORE FT
Vital Signs Last 24 Hrs  T(C): 36.7 (01-27-23 @ 07:04), Max: 36.7 (01-27-23 @ 07:04)  T(F): 98 (01-27-23 @ 07:04), Max: 98 (01-27-23 @ 07:04)  HR: --  BP: --  BP(mean): --  RR: 19 (01-27-23 @ 07:04) (19 - 19)  SpO2: 99% (01-27-23 @ 07:04) (99% - 99%)    Orthostatic VS  01-27-23 @ 07:04  Lying BP: --/-- HR: --  Sitting BP: 125/80 HR: 77  Standing BP: 128/78 HR: 79  Site: upper right arm  Mode: electronic  Orthostatic VS  01-26-23 @ 07:48  Lying BP: --/-- HR: --  Sitting BP: 116/6 HR: 50  Standing BP: 120/85 HR: 70  Site: upper right arm  Mode: electronic

## 2023-01-27 NOTE — BH INPATIENT PSYCHIATRY PROGRESS NOTE - NSBHTIMEACTIVITIESPERFORMED_PSY_A_CORE
1. interviewed the pt to assess current mental status  2. reviewed medications added Depakote for mood and affect lability   3. reviewed lab results   4. conferred with  nursing for pt overall behavioral function   5. conferred with social work for any possible added information to locate past hx and collaterals,

## 2023-01-27 NOTE — BH INPATIENT PSYCHIATRY PROGRESS NOTE - CURRENT MEDICATION
MEDICATIONS  (STANDING):  benztropine 0.5 milliGRAM(s) Oral three times a day  divalproex  milliGRAM(s) Oral at bedtime  haloperidol     Tablet 5 milliGRAM(s) Oral three times a day  haloperidol    Injectable 5 milliGRAM(s) IntraMuscular once  LORazepam   Injectable 2 milliGRAM(s) IntraMuscular once    MEDICATIONS  (PRN):  haloperidol     Tablet 2 milliGRAM(s) Oral every 6 hours PRN Mod Agitation  LORazepam     Tablet 1 milliGRAM(s) Oral every 6 hours PRN Mod Agitation  traZODone 50 milliGRAM(s) Oral at bedtime PRN insomnia

## 2023-01-27 NOTE — BH INPATIENT PSYCHIATRY PROGRESS NOTE - NSBHASSESSSUMMFT_PSY_ALL_CORE
Pt remaining psychotic and responding to internal stimuli. Pt is impulsive , unpredictable , with auditory hallucinations. He remains guarded suspicious and impaired in judgement.  Unclear if the pt is refractory to haldol and if so may need trial with Clozaril? Unable to obtain collateral information as the pt is very symptomatic and unable to give added information

## 2023-01-27 NOTE — BH INPATIENT PSYCHIATRY PROGRESS NOTE - PRN MEDS
MEDICATIONS  (PRN):  haloperidol     Tablet 2 milliGRAM(s) Oral every 6 hours PRN Mod Agitation  LORazepam     Tablet 1 milliGRAM(s) Oral every 6 hours PRN Mod Agitation  traZODone 50 milliGRAM(s) Oral at bedtime PRN insomnia

## 2023-01-28 PROCEDURE — 99222 1ST HOSP IP/OBS MODERATE 55: CPT

## 2023-01-28 PROCEDURE — 99231 SBSQ HOSP IP/OBS SF/LOW 25: CPT

## 2023-01-28 RX ADMIN — HALOPERIDOL DECANOATE 5 MILLIGRAM(S): 100 INJECTION INTRAMUSCULAR at 13:08

## 2023-01-28 RX ADMIN — HALOPERIDOL DECANOATE 5 MILLIGRAM(S): 100 INJECTION INTRAMUSCULAR at 20:44

## 2023-01-28 RX ADMIN — DIVALPROEX SODIUM 500 MILLIGRAM(S): 500 TABLET, DELAYED RELEASE ORAL at 20:43

## 2023-01-28 RX ADMIN — HALOPERIDOL DECANOATE 5 MILLIGRAM(S): 100 INJECTION INTRAMUSCULAR at 09:42

## 2023-01-28 RX ADMIN — Medication 0.5 MILLIGRAM(S): at 13:08

## 2023-01-28 RX ADMIN — Medication 0.5 MILLIGRAM(S): at 20:43

## 2023-01-28 RX ADMIN — Medication 50 MILLIGRAM(S): at 23:39

## 2023-01-28 RX ADMIN — Medication 0.5 MILLIGRAM(S): at 09:41

## 2023-01-28 NOTE — BH INPATIENT PSYCHIATRY PROGRESS NOTE - NSBHCHARTREVIEWVS_PSY_A_CORE FT
Vital Signs Last 24 Hrs  T(C): 36.4 (01-28-23 @ 07:40), Max: 36.4 (01-28-23 @ 07:40)  T(F): 97.6 (01-28-23 @ 07:40), Max: 97.6 (01-28-23 @ 07:40)  HR: --  BP: --  BP(mean): --  RR: 16 (01-28-23 @ 07:40) (16 - 16)  SpO2: 99% (01-28-23 @ 07:40) (99% - 99%)    Orthostatic VS  01-28-23 @ 07:40  Lying BP: --/-- HR: --  Sitting BP: 120/62 HR: 80  Standing BP: 111/67 HR: 103  Site: upper right arm  Mode: electronic  Orthostatic VS  01-27-23 @ 07:04  Lying BP: --/-- HR: --  Sitting BP: 125/80 HR: 77  Standing BP: 128/78 HR: 79  Site: upper right arm  Mode: electronic

## 2023-01-28 NOTE — H&P ADULT - NSHPPHYSICALEXAM_GEN_ALL_CORE
Vital Signs Last 24 Hrs  T(C): 36.4 (28 Jan 2023 07:40), Max: 36.4 (28 Jan 2023 07:40)  T(F): 97.6 (28 Jan 2023 07:40), Max: 97.6 (28 Jan 2023 07:40)  HR: 82  BP: 120/62  RR: 16 (28 Jan 2023 07:40) (16 - 16)  SpO2: 99% (28 Jan 2023 07:40) (99% - 99%)    Parameters below as of 28 Jan 2023 07:40  Patient On (Oxygen Delivery Method): room air

## 2023-01-28 NOTE — H&P ADULT - NS ATTEND AMEND GEN_ALL_CORE FT
26 y/o M PMHx as noted above admitted to inpatient Psychiatry for further management of increasing paranoia/progression of schizophrenia.    #Schizophrenia   -cont. management per inpatient Psychiatry    # Abnormal EKG  -as noted patient is asymptomatic at this time   -EKG reviewed  -will repeat EKG in the am  -may be medication induced     # Hyperglycemia/ prediabetes   -cont. Low carb diet  -encouraged lifestyle modification     #Vte ppx  -patient is ambulatory, encourage ambulation

## 2023-01-28 NOTE — H&P ADULT - HISTORY OF PRESENT ILLNESS
26 y/o male with no significant PMHx and a past psychiatric hx pf Schizophrenia/SAD, PTSD, Cannabis abuse, admitted to inpatient psychiatry due to increasing paranoia. Per chart, patient presented to [facility] [place], on arrival he was oddly related, guarded, unable to elaborate linear events prior to the hospital. Patient reports that he is "depressed" and "emotions are bad" and that he is "lagging" and "breaking out" . Patient states that he feels more paranoid and feels "danger and eeriness". Patient was transferred to  for continued evaluation and management of paranoia/ schizophrenia.    Medical consult was called for medical management.. At time of exam, pt denies any active medical issues. No significant PMHx. Denies fever, chills, CP, SOB, wheezing, palpitations, abd pain, n/v/d    Labs reviewed, hgb 5.7, average glucose 1117  EKG: Junctional rhythm @ 45 bpm

## 2023-01-28 NOTE — BH INPATIENT PSYCHIATRY PROGRESS NOTE - NSBHASSESSSUMMFT_PSY_ALL_CORE
Pt remaining psychotic and responding to internal stimuli. Pt is impulsive , unpredictable , with auditory hallucinations. He remains guarded suspicious and impaired in judgement.      No interval changes.  Continue management as per treatment team.  MEDICATIONS  (STANDING):  benztropine 0.5 milliGRAM(s) Oral three times a day  divalproex  milliGRAM(s) Oral at bedtime  haloperidol     Tablet 5 milliGRAM(s) Oral three times a day    MEDICATIONS  (PRN):  haloperidol     Tablet 2 milliGRAM(s) Oral every 6 hours PRN Mod Agitation  LORazepam     Tablet 1 milliGRAM(s) Oral every 6 hours PRN Mod Agitation  traZODone 50 milliGRAM(s) Oral at bedtime PRN insomnia

## 2023-01-28 NOTE — H&P ADULT - ASSESSMENT
28 y/o male with no significant PMHx and a past psychiatric hx pf Schizophrenia/SAD, PTSD, Cannabis abuse admitted to  for increasing paranoia.     # Schizophrenia   - Management per psych  - Patient is medically stable for psychiatry admission     # Abnormal EKG  Asymptomatic   Junctional @ 45 bpm   - Repeat EKG in am     # Hyperglycemia/ prediabetes   - low carb diet  - Lifestyle modification     DVT ppx  Pt is ambulatory, encourage ambulation     Will follow EKG result    D/w Dr. Lazo

## 2023-01-28 NOTE — H&P ADULT - LYMPHATIC
Problem: Falls - Risk of  Goal: *Absence of Falls  Document Moy Fall Risk and appropriate interventions in the flowsheet.    Outcome: Progressing Towards Goal  Fall Risk Interventions:  Mobility Interventions: Bed/chair exit alarm, Communicate number of staff needed for ambulation/transfer, Patient to call before getting OOB, PT Consult for mobility concerns         Medication Interventions: Bed/chair exit alarm, Patient to call before getting OOB, Teach patient to arise slowly    Elimination Interventions: Bed/chair exit alarm, Call light in reach, Patient to call for help with toileting needs, Toileting schedule/hourly rounds No lymphadedenopathy

## 2023-01-29 LAB
24R-OH-CALCIDIOL SERPL-MCNC: 27.8 NG/ML — LOW (ref 30–80)
ADD ON TEST-SPECIMEN IN LAB: SIGNIFICANT CHANGE UP
CRP SERPL-MCNC: <3 MG/L — SIGNIFICANT CHANGE UP
MAGNESIUM SERPL-MCNC: 2 MG/DL — SIGNIFICANT CHANGE UP (ref 1.6–2.6)
PHOSPHATE SERPL-MCNC: 4.6 MG/DL — HIGH (ref 2.5–4.5)
TSH SERPL-MCNC: 2.58 UU/ML — SIGNIFICANT CHANGE UP (ref 0.34–4.82)

## 2023-01-29 PROCEDURE — 93010 ELECTROCARDIOGRAM REPORT: CPT

## 2023-01-29 PROCEDURE — 99232 SBSQ HOSP IP/OBS MODERATE 35: CPT

## 2023-01-29 PROCEDURE — 71045 X-RAY EXAM CHEST 1 VIEW: CPT | Mod: 26

## 2023-01-29 PROCEDURE — 99231 SBSQ HOSP IP/OBS SF/LOW 25: CPT

## 2023-01-29 PROCEDURE — 70450 CT HEAD/BRAIN W/O DYE: CPT | Mod: 26

## 2023-01-29 RX ADMIN — HALOPERIDOL DECANOATE 5 MILLIGRAM(S): 100 INJECTION INTRAMUSCULAR at 09:53

## 2023-01-29 RX ADMIN — DIVALPROEX SODIUM 500 MILLIGRAM(S): 500 TABLET, DELAYED RELEASE ORAL at 20:42

## 2023-01-29 RX ADMIN — Medication 1 MILLIGRAM(S): at 23:35

## 2023-01-29 RX ADMIN — Medication 0.5 MILLIGRAM(S): at 20:42

## 2023-01-29 RX ADMIN — Medication 0.5 MILLIGRAM(S): at 09:53

## 2023-01-29 RX ADMIN — HALOPERIDOL DECANOATE 5 MILLIGRAM(S): 100 INJECTION INTRAMUSCULAR at 20:42

## 2023-01-29 RX ADMIN — Medication 0.5 MILLIGRAM(S): at 13:31

## 2023-01-29 RX ADMIN — HALOPERIDOL DECANOATE 5 MILLIGRAM(S): 100 INJECTION INTRAMUSCULAR at 13:31

## 2023-01-29 RX ADMIN — Medication 50 MILLIGRAM(S): at 20:42

## 2023-01-29 NOTE — PROGRESS NOTE ADULT - ASSESSMENT
28 y/o male with no significant PMHx and a past psychiatric hx pf Schizophrenia/SAD, PTSD, Cannabis abuse admitted to N for increasing paranoia.     # Schizophrenia   - Management per psych  - Patient is medically stable for psychiatry admission     # Abnormal EKG- Junctional rhythm resolved   Asymptomatic   Junctional @ 45 bpm   - Repeat EKG - 65 sinus     # Hyperglycemia due to prediabetes   - low carb diet, A1C 5.7   - Lifestyle modification     Dizziness '  - can be related to antipsychotics but will r/o cardiac or neurologic etiology   - CT head - neg for acute pathology   - check B12, TSH, vitamin D , folate , check Ck, BNP,  troponin  - orthostatics     DVT ppx  Pt is ambulatory, encourage ambulation     Dispo - will follow tests

## 2023-01-29 NOTE — BH INPATIENT PSYCHIATRY PROGRESS NOTE - NSBHCHARTREVIEWINVESTIGATE_PSY_A_CORE FT
Ventricular Rate 45 BPM    QRS Duration 90 ms    Q-T Interval 388 ms    QTC Calculation(Bazett) 335 ms    R Axis 85 degrees    T Axis 56 degrees    Diagnosis Line Junctional rhythm  Abnormal ECG  No previous ECGs available  Confirmed by ERIC CAMPOS PAUL [id number]) on 1/26/2023 3:30:47 PM  
Ventricular Rate 45 BPM    QRS Duration 90 ms    Q-T Interval 388 ms    QTC Calculation(Bazett) 335 ms    R Axis 85 degrees    T Axis 56 degrees    Diagnosis Line Junctional rhythm  Abnormal ECG  No previous ECGs available  Confirmed by ERIC CAMPOS PAUL ([id number]) on 1/26/2023 3:30:47 PM

## 2023-01-29 NOTE — PROGRESS NOTE ADULT - SUBJECTIVE AND OBJECTIVE BOX
Subjective:  Chief complain :  dizziness     HPI:     28 y/o male with no significant PMHx and a past psychiatric hx pf Schizophrenia/SAD, PTSD, Cannabis abuse, admitted to inpatient psychiatry due to increasing paranoia. Per chart, patient presented to [facility] [place], on arrival he was oddly related, guarded, unable to elaborate linear events prior to the hospital. Patient reports that he is "depressed" and "emotions are bad" and that he is "lagging" and "breaking out" . Patient states that he feels more paranoid and feels "danger and eeriness". Patient was transferred to  for continued evaluation and management of paranoia/ schizophrenia.  Medical consult was called for medical management.. Labs reviewed, hgb 5.7, average glucose 1117  EKG: Junctional rhythm @ 45 bpm      - Patient seen and examined at bedside earlier today, reports dizziness, mild headache, unsteady gait, wear feeling in the chest , denies palpitations, abdominal pain, urinary symptoms    Review of system- Rest of the review of system are negative except mentioned in HPI     Vital sings reviewed for last 24 h  T(C): 36.3 (23 @ 07:20), Max: 36.3 (23 @ 07:20)  HR: --  BP: --  RR: 16 (23 @ 07:20) (16 - 16)  SpO2: 100% (23 @ 07:20) (100% - 100%)  Wt(kg): --  Daily     Daily Weight in k.1 (2023 07:20)  CAPILLARY BLOOD GLUCOSE      Physical exam:   General : NAD, appear to be of stated age , well groomed   NERVOUS SYSTEM:  Alert & Oriented X3, non- focal exam  HEAD:  Atraumatic, Normocephalic  EYES: EOMI, PERRLA, conjunctiva and sclera clear  HEENT: Moist mucous membranes, Supple neck , No JVD  CHEST: Clear to auscultation bilaterally; No rales, no rhonchi, no wheezing  HEART: Regular rate and rhythm; No murmurs, no rubs or gallops  ABDOMEN: Soft, Non-tender, Non-distended; Bowel sounds present, no guarding , no peritoneal irritation   GENITOURINARY- Voiding, no suprapubic tenderness  EXTREMITIES:  2+ Peripheral Pulses, No clubbing, cyanosis,   edema  MUSCULOSKELETAL:- No muscle tenderness, Muscle tone normal, No joint tenderness, no Joint swelling,  Joint ROM –normal   SKIN-no rash, no lesion    Labs radiologic and other test : all reviewed and interpret       < from: CT Head No Cont (23 @ 17:33) >  IMPRESSION:    No acute intracranial hemorrhage, hydrocephalus or extra-axial fluid   collection.  No CT evidence for acute transcortical infarction. Please note that MR   imaging is a more sensitive imaging modality for detection of acute  infarction and may be obtained as clinically warranted.    < end of copied text >    Phos  4.6       Mg     2.0               RECENT CULTURES:      Cardiac testing : reviewed   EKG  - NSR at 65 bpm, no ischemic changes    Procedures :     Devices:     Current medications:  benztropine 0.5 milliGRAM(s) Oral three times a day  divalproex  milliGRAM(s) Oral at bedtime  haloperidol     Tablet 5 milliGRAM(s) Oral three times a day  haloperidol     Tablet 2 milliGRAM(s) Oral every 6 hours PRN  haloperidol    Injectable 5 milliGRAM(s) IntraMuscular once  LORazepam     Tablet 1 milliGRAM(s) Oral every 6 hours PRN  LORazepam   Injectable 2 milliGRAM(s) IntraMuscular once  traZODone 50 milliGRAM(s) Oral at bedtime PRN

## 2023-01-29 NOTE — BH INPATIENT PSYCHIATRY PROGRESS NOTE - NSBHCHARTREVIEWVS_PSY_A_CORE FT
Vital Signs Last 24 Hrs  T(C): 36.3 (01-29-23 @ 07:20), Max: 36.3 (01-29-23 @ 07:20)  T(F): 97.3 (01-29-23 @ 07:20), Max: 97.3 (01-29-23 @ 07:20)  HR: --  BP: --  BP(mean): --  RR: 16 (01-29-23 @ 07:20) (16 - 16)  SpO2: 100% (01-29-23 @ 07:20) (100% - 100%)    Orthostatic VS  01-29-23 @ 07:20  Lying BP: --/-- HR: --  Sitting BP: 121/68 HR: 65  Standing BP: 131/85 HR: 92  Site: upper right arm  Mode: electronic  Orthostatic VS  01-28-23 @ 07:40  Lying BP: --/-- HR: --  Sitting BP: 120/62 HR: 80  Standing BP: 111/67 HR: 103  Site: upper right arm  Mode: electronic

## 2023-01-29 NOTE — BH INPATIENT PSYCHIATRY PROGRESS NOTE - NSBHASSESSSUMMFT_PSY_ALL_CORE
Pt remaining psychotic and responding to internal stimuli. He remains guarded suspicious and impaired in judgement.      No interval changes.  Continue management as per treatment team.  MEDICATIONS  (STANDING):  benztropine 0.5 milliGRAM(s) Oral three times a day  divalproex  milliGRAM(s) Oral at bedtime  haloperidol     Tablet 5 milliGRAM(s) Oral three times a day    MEDICATIONS  (PRN):  haloperidol     Tablet 2 milliGRAM(s) Oral every 6 hours PRN Mod Agitation  LORazepam     Tablet 1 milliGRAM(s) Oral every 6 hours PRN Mod Agitation  traZODone 50 milliGRAM(s) Oral at bedtime PRN insomnia

## 2023-01-30 LAB
ALBUMIN SERPL ELPH-MCNC: 3.7 G/DL — SIGNIFICANT CHANGE UP (ref 3.3–5)
ALP SERPL-CCNC: 67 U/L — SIGNIFICANT CHANGE UP (ref 40–120)
ALT FLD-CCNC: 27 U/L — SIGNIFICANT CHANGE UP (ref 12–78)
AMMONIA BLD-MCNC: 50 UMOL/L — HIGH (ref 11–32)
ANION GAP SERPL CALC-SCNC: 5 MMOL/L — SIGNIFICANT CHANGE UP (ref 5–17)
AST SERPL-CCNC: 19 U/L — SIGNIFICANT CHANGE UP (ref 15–37)
BASOPHILS # BLD AUTO: 0.03 K/UL — SIGNIFICANT CHANGE UP (ref 0–0.2)
BASOPHILS NFR BLD AUTO: 0.5 % — SIGNIFICANT CHANGE UP (ref 0–2)
BILIRUB SERPL-MCNC: 0.9 MG/DL — SIGNIFICANT CHANGE UP (ref 0.2–1.2)
BUN SERPL-MCNC: 18 MG/DL — SIGNIFICANT CHANGE UP (ref 7–23)
CALCIUM SERPL-MCNC: 9 MG/DL — SIGNIFICANT CHANGE UP (ref 8.5–10.1)
CHLORIDE SERPL-SCNC: 105 MMOL/L — SIGNIFICANT CHANGE UP (ref 96–108)
CK SERPL-CCNC: 188 U/L — SIGNIFICANT CHANGE UP (ref 26–308)
CO2 SERPL-SCNC: 28 MMOL/L — SIGNIFICANT CHANGE UP (ref 22–31)
CREAT SERPL-MCNC: 0.88 MG/DL — SIGNIFICANT CHANGE UP (ref 0.5–1.3)
CRP SERPL-MCNC: <3 MG/L — SIGNIFICANT CHANGE UP
EGFR: 122 ML/MIN/1.73M2 — SIGNIFICANT CHANGE UP
EOSINOPHIL # BLD AUTO: 0.24 K/UL — SIGNIFICANT CHANGE UP (ref 0–0.5)
EOSINOPHIL NFR BLD AUTO: 4 % — SIGNIFICANT CHANGE UP (ref 0–6)
FOLATE SERPL-MCNC: 11.3 NG/ML — SIGNIFICANT CHANGE UP
GLUCOSE SERPL-MCNC: 92 MG/DL — SIGNIFICANT CHANGE UP (ref 70–99)
HCT VFR BLD CALC: 44 % — SIGNIFICANT CHANGE UP (ref 39–50)
HGB BLD-MCNC: 14.5 G/DL — SIGNIFICANT CHANGE UP (ref 13–17)
IMM GRANULOCYTES NFR BLD AUTO: 0.2 % — SIGNIFICANT CHANGE UP (ref 0–0.9)
LYMPHOCYTES # BLD AUTO: 2.59 K/UL — SIGNIFICANT CHANGE UP (ref 1–3.3)
LYMPHOCYTES # BLD AUTO: 43 % — SIGNIFICANT CHANGE UP (ref 13–44)
MAGNESIUM SERPL-MCNC: 2 MG/DL — SIGNIFICANT CHANGE UP (ref 1.6–2.6)
MCHC RBC-ENTMCNC: 28.3 PG — SIGNIFICANT CHANGE UP (ref 27–34)
MCHC RBC-ENTMCNC: 33 GM/DL — SIGNIFICANT CHANGE UP (ref 32–36)
MCV RBC AUTO: 85.9 FL — SIGNIFICANT CHANGE UP (ref 80–100)
MONOCYTES # BLD AUTO: 0.41 K/UL — SIGNIFICANT CHANGE UP (ref 0–0.9)
MONOCYTES NFR BLD AUTO: 6.8 % — SIGNIFICANT CHANGE UP (ref 2–14)
NEUTROPHILS # BLD AUTO: 2.74 K/UL — SIGNIFICANT CHANGE UP (ref 1.8–7.4)
NEUTROPHILS NFR BLD AUTO: 45.5 % — SIGNIFICANT CHANGE UP (ref 43–77)
NT-PROBNP SERPL-SCNC: <5 PG/ML — SIGNIFICANT CHANGE UP (ref 0–125)
PHOSPHATE SERPL-MCNC: 4.3 MG/DL — SIGNIFICANT CHANGE UP (ref 2.5–4.5)
PLATELET # BLD AUTO: 236 K/UL — SIGNIFICANT CHANGE UP (ref 150–400)
POTASSIUM SERPL-MCNC: 4.1 MMOL/L — SIGNIFICANT CHANGE UP (ref 3.5–5.3)
POTASSIUM SERPL-SCNC: 4.1 MMOL/L — SIGNIFICANT CHANGE UP (ref 3.5–5.3)
PROT SERPL-MCNC: 6.6 GM/DL — SIGNIFICANT CHANGE UP (ref 6–8.3)
RBC # BLD: 5.12 M/UL — SIGNIFICANT CHANGE UP (ref 4.2–5.8)
RBC # FLD: 13.6 % — SIGNIFICANT CHANGE UP (ref 10.3–14.5)
SODIUM SERPL-SCNC: 138 MMOL/L — SIGNIFICANT CHANGE UP (ref 135–145)
TROPONIN I, HIGH SENSITIVITY RESULT: 6.94 NG/L — SIGNIFICANT CHANGE UP
TSH SERPL-MCNC: 1.17 UU/ML — SIGNIFICANT CHANGE UP (ref 0.34–4.82)
VALPROATE SERPL-MCNC: 35 UG/ML — LOW (ref 50–100)
VIT B12 SERPL-MCNC: 619 PG/ML — SIGNIFICANT CHANGE UP (ref 232–1245)
WBC # BLD: 6.02 K/UL — SIGNIFICANT CHANGE UP (ref 3.8–10.5)
WBC # FLD AUTO: 6.02 K/UL — SIGNIFICANT CHANGE UP (ref 3.8–10.5)

## 2023-01-30 PROCEDURE — 99231 SBSQ HOSP IP/OBS SF/LOW 25: CPT

## 2023-01-30 PROCEDURE — 99233 SBSQ HOSP IP/OBS HIGH 50: CPT

## 2023-01-30 RX ORDER — DIVALPROEX SODIUM 500 MG/1
750 TABLET, DELAYED RELEASE ORAL AT BEDTIME
Refills: 0 | Status: DISCONTINUED | OUTPATIENT
Start: 2023-01-30 | End: 2023-02-10

## 2023-01-30 RX ORDER — HALOPERIDOL DECANOATE 100 MG/ML
2 INJECTION INTRAMUSCULAR
Refills: 0 | Status: DISCONTINUED | OUTPATIENT
Start: 2023-01-30 | End: 2023-01-30

## 2023-01-30 RX ORDER — CHOLECALCIFEROL (VITAMIN D3) 125 MCG
2000 CAPSULE ORAL AT BEDTIME
Refills: 0 | Status: DISCONTINUED | OUTPATIENT
Start: 2023-01-30 | End: 2023-02-10

## 2023-01-30 RX ORDER — HALOPERIDOL DECANOATE 100 MG/ML
10 INJECTION INTRAMUSCULAR
Refills: 0 | Status: DISCONTINUED | OUTPATIENT
Start: 2023-01-30 | End: 2023-02-10

## 2023-01-30 RX ADMIN — HALOPERIDOL DECANOATE 5 MILLIGRAM(S): 100 INJECTION INTRAMUSCULAR at 09:09

## 2023-01-30 RX ADMIN — Medication 0.5 MILLIGRAM(S): at 09:09

## 2023-01-30 RX ADMIN — DIVALPROEX SODIUM 750 MILLIGRAM(S): 500 TABLET, DELAYED RELEASE ORAL at 20:50

## 2023-01-30 RX ADMIN — HALOPERIDOL DECANOATE 10 MILLIGRAM(S): 100 INJECTION INTRAMUSCULAR at 20:50

## 2023-01-30 RX ADMIN — Medication 2000 UNIT(S): at 20:51

## 2023-01-30 RX ADMIN — Medication 0.5 MILLIGRAM(S): at 20:50

## 2023-01-30 RX ADMIN — Medication 0.5 MILLIGRAM(S): at 15:12

## 2023-01-30 NOTE — BH INPATIENT PSYCHIATRY PROGRESS NOTE - CURRENT MEDICATION
MEDICATIONS  (STANDING):  benztropine 0.5 milliGRAM(s) Oral three times a day  cholecalciferol 2000 Unit(s) Oral at bedtime  divalproex  milliGRAM(s) Oral at bedtime  haloperidol     Tablet 2 milliGRAM(s) Oral two times a day  haloperidol    Injectable 5 milliGRAM(s) IntraMuscular once  LORazepam   Injectable 2 milliGRAM(s) IntraMuscular once    MEDICATIONS  (PRN):  haloperidol     Tablet 2 milliGRAM(s) Oral every 6 hours PRN Mod Agitation  LORazepam     Tablet 1 milliGRAM(s) Oral every 6 hours PRN Mod Agitation  traZODone 50 milliGRAM(s) Oral at bedtime PRN insomnia

## 2023-01-30 NOTE — BH INPATIENT PSYCHIATRY PROGRESS NOTE - NSBHCHARTREVIEWVS_PSY_A_CORE FT
Vital Signs Last 24 Hrs  T(C): 36.5 (01-30-23 @ 08:18), Max: 36.5 (01-30-23 @ 08:18)  T(F): 97.7 (01-30-23 @ 08:18), Max: 97.7 (01-30-23 @ 08:18)  HR: --  BP: --  BP(mean): --  RR: 18 (01-30-23 @ 08:18) (18 - 18)  SpO2: 100% (01-30-23 @ 08:18) (100% - 100%)    Orthostatic VS  01-30-23 @ 08:18  Lying BP: --/-- HR: --  Sitting BP: 116/70 HR: 74  Standing BP: 128/71 HR: 95  Site: upper right arm  Mode: electronic  Orthostatic VS  01-29-23 @ 07:20  Lying BP: --/-- HR: --  Sitting BP: 121/68 HR: 65  Standing BP: 131/85 HR: 92  Site: upper right arm  Mode: electronic

## 2023-01-30 NOTE — BH INPATIENT PSYCHIATRY PROGRESS NOTE - NSBHASSESSSUMMFT_PSY_ALL_CORE
Pt with continued response to internal stimuli .  Pt with limited insight and judgement.  Pt still not divulging information and access to collateral

## 2023-01-30 NOTE — CHART NOTE - NSCHARTNOTEFT_GEN_A_CORE
Labs, CT head -normal, CXR - mild RUL atelectasis   labs all normal except vitamin D deficiency   start vitamin D daily  incentive spirometry   increase activity     will sign off

## 2023-01-30 NOTE — BH INPATIENT PSYCHIATRY PROGRESS NOTE - NSBHTIMEACTIVITIESPERFORMED_PSY_A_CORE
1. interviewed the pt to assess current mental status  2. reviewed medications    3. reviewed lab results   4. conferred with nursing concerning pt behavior during the day    5. conferred with social work concerning aftercare planning

## 2023-01-31 LAB — VALPROATE SERPL-MCNC: 45 UG/ML — LOW (ref 50–100)

## 2023-01-31 PROCEDURE — 99232 SBSQ HOSP IP/OBS MODERATE 35: CPT

## 2023-01-31 RX ADMIN — HALOPERIDOL DECANOATE 10 MILLIGRAM(S): 100 INJECTION INTRAMUSCULAR at 09:35

## 2023-01-31 RX ADMIN — Medication 0.5 MILLIGRAM(S): at 13:26

## 2023-01-31 RX ADMIN — DIVALPROEX SODIUM 750 MILLIGRAM(S): 500 TABLET, DELAYED RELEASE ORAL at 20:58

## 2023-01-31 RX ADMIN — HALOPERIDOL DECANOATE 10 MILLIGRAM(S): 100 INJECTION INTRAMUSCULAR at 20:59

## 2023-01-31 RX ADMIN — Medication 0.5 MILLIGRAM(S): at 09:35

## 2023-01-31 RX ADMIN — Medication 1 MILLIGRAM(S): at 21:03

## 2023-01-31 RX ADMIN — Medication 50 MILLIGRAM(S): at 00:16

## 2023-01-31 RX ADMIN — Medication 2000 UNIT(S): at 20:59

## 2023-01-31 RX ADMIN — Medication 0.5 MILLIGRAM(S): at 21:00

## 2023-01-31 RX ADMIN — Medication 1 MILLIGRAM(S): at 00:16

## 2023-01-31 NOTE — BH INPATIENT PSYCHIATRY PROGRESS NOTE - NSBHCHARTREVIEWVS_PSY_A_CORE FT
Vital Signs Last 24 Hrs  T(C): 36.7   T(F): 98   HR: --  BP: --  BP(mean): --  RR: 16     Orthostatic VS    Lying BP: --/-- HR: --  Sitting BP: 110/67 HR: 75  Standing BP: 113/62 HR: 100  Site: upper right arm  Mode: electronic  Orthostatic VS  01-31-23 @ 07:17  Lying BP: --/-- HR: --  Sitting BP: 118/71 HR: 70  Standing BP: 109/72 HR: 100  Site: upper right arm  Mode: electronic

## 2023-01-31 NOTE — PSYCHIATRIC REHAB INITIAL EVALUATION - NSPROEDALEARNPREF_GEN_A_NUR
individual instruction Suturegard Intro: Intraoperative tissue expansion was performed, utilizing the SUTUREGARD device, in order to reduce wound tension.

## 2023-01-31 NOTE — BH INPATIENT PSYCHIATRY PROGRESS NOTE - NSBHCHARTREVIEWVS_PSY_A_CORE FT
Vital Signs Last 24 Hrs  T(C): 36.5 (01-31-23 @ 07:17), Max: 36.5 (01-31-23 @ 07:17)  T(F): 97.7 (01-31-23 @ 07:17), Max: 97.7 (01-31-23 @ 07:17)  HR: --  BP: --  BP(mean): --  RR: 16 (01-31-23 @ 07:17) (16 - 16)  SpO2: 100% (01-31-23 @ 07:17) (100% - 100%)    Orthostatic VS  01-31-23 @ 07:17  Lying BP: --/-- HR: --  Sitting BP: 118/71 HR: 70  Standing BP: 109/72 HR: 100  Site: upper right arm  Mode: electronic  Orthostatic VS  01-30-23 @ 08:18  Lying BP: --/-- HR: --  Sitting BP: 116/70 HR: 74  Standing BP: 128/71 HR: 95  Site: upper right arm  Mode: electronic

## 2023-01-31 NOTE — BH INPATIENT PSYCHIATRY PROGRESS NOTE - NSBHASSESSSUMMFT_PSY_ALL_CORE
Pt with decreased response to internal stimuli . Pt is apparently with no distress concerning any of his hallucination .

## 2023-01-31 NOTE — BH INPATIENT PSYCHIATRY PROGRESS NOTE - CURRENT MEDICATION
MEDICATIONS  (STANDING):  benztropine 0.5 milliGRAM(s) Oral three times a day  cholecalciferol 2000 Unit(s) Oral at bedtime  divalproex  milliGRAM(s) Oral at bedtime  haloperidol     Tablet 10 milliGRAM(s) Oral two times a day  haloperidol    Injectable 5 milliGRAM(s) IntraMuscular once  LORazepam   Injectable 2 milliGRAM(s) IntraMuscular once    MEDICATIONS  (PRN):  haloperidol     Tablet 2 milliGRAM(s) Oral every 6 hours PRN Mod Agitation  LORazepam     Tablet 1 milliGRAM(s) Oral every 6 hours PRN Mod Agitation  traZODone 50 milliGRAM(s) Oral at bedtime PRN insomnia

## 2023-01-31 NOTE — BH INPATIENT PSYCHIATRY PROGRESS NOTE - NSBHTIMEACTIVITIESPERFORMED_PSY_A_CORE
28 y/o single male, homeless, past psychiatric hx pf Schizophrenia/SAD, PTSD, Cannabis abuseBIB/Self due to increasing paranoia.

## 2023-02-01 PROCEDURE — 99233 SBSQ HOSP IP/OBS HIGH 50: CPT

## 2023-02-01 RX ADMIN — Medication 50 MILLIGRAM(S): at 23:28

## 2023-02-01 RX ADMIN — HALOPERIDOL DECANOATE 10 MILLIGRAM(S): 100 INJECTION INTRAMUSCULAR at 21:40

## 2023-02-01 RX ADMIN — Medication 1 MILLIGRAM(S): at 23:29

## 2023-02-01 RX ADMIN — DIVALPROEX SODIUM 750 MILLIGRAM(S): 500 TABLET, DELAYED RELEASE ORAL at 21:39

## 2023-02-01 RX ADMIN — Medication 0.5 MILLIGRAM(S): at 21:41

## 2023-02-01 RX ADMIN — HALOPERIDOL DECANOATE 10 MILLIGRAM(S): 100 INJECTION INTRAMUSCULAR at 10:38

## 2023-02-01 RX ADMIN — Medication 2000 UNIT(S): at 21:40

## 2023-02-01 RX ADMIN — Medication 0.5 MILLIGRAM(S): at 15:03

## 2023-02-01 RX ADMIN — Medication 0.5 MILLIGRAM(S): at 10:38

## 2023-02-01 NOTE — BH INPATIENT PSYCHIATRY PROGRESS NOTE - NSBHCHARTREVIEWVS_PSY_A_CORE FT
Vital Signs Last 24 Hrs  T(C): 36.7 (02-01-23 @ 07:01), Max: 36.7 (02-01-23 @ 07:01)  T(F): 98 (02-01-23 @ 07:01), Max: 98 (02-01-23 @ 07:01)  HR: --  BP: --  BP(mean): --  RR: 16 (02-01-23 @ 07:01) (16 - 16)  SpO2: 100% (02-01-23 @ 07:01) (100% - 100%)    Orthostatic VS  02-01-23 @ 07:01  Lying BP: --/-- HR: --  Sitting BP: 110/67 HR: 75  Standing BP: 113/62 HR: 100  Site: upper right arm  Mode: electronic  Orthostatic VS  01-31-23 @ 07:17  Lying BP: --/-- HR: --  Sitting BP: 118/71 HR: 70  Standing BP: 109/72 HR: 100  Site: upper right arm  Mode: electronic

## 2023-02-01 NOTE — BH INPATIENT PSYCHIATRY PROGRESS NOTE - NSBHTIMEACTIVITIESPERFORMED_PSY_A_CORE
1. interviewed the pt to assess current mental status  2. spoke with the pt about the potential benefit with us of decanoate but pt declined.     3. reviewed lab results   4. conferred with social work concerning aftercare planning

## 2023-02-01 NOTE — BH INPATIENT PSYCHIATRY PROGRESS NOTE - NSBHASSESSSUMMFT_PSY_ALL_CORE
Pt with gradual decreased intensity of the response to internal stimuli .  Pt with limited insight .

## 2023-02-02 PROCEDURE — 99233 SBSQ HOSP IP/OBS HIGH 50: CPT

## 2023-02-02 RX ADMIN — Medication 50 MILLIGRAM(S): at 20:42

## 2023-02-02 RX ADMIN — Medication 0.5 MILLIGRAM(S): at 12:36

## 2023-02-02 RX ADMIN — DIVALPROEX SODIUM 750 MILLIGRAM(S): 500 TABLET, DELAYED RELEASE ORAL at 20:42

## 2023-02-02 RX ADMIN — Medication 2000 UNIT(S): at 20:43

## 2023-02-02 RX ADMIN — Medication 0.5 MILLIGRAM(S): at 09:10

## 2023-02-02 RX ADMIN — HALOPERIDOL DECANOATE 10 MILLIGRAM(S): 100 INJECTION INTRAMUSCULAR at 20:41

## 2023-02-02 RX ADMIN — HALOPERIDOL DECANOATE 10 MILLIGRAM(S): 100 INJECTION INTRAMUSCULAR at 09:10

## 2023-02-02 RX ADMIN — Medication 0.5 MILLIGRAM(S): at 20:43

## 2023-02-02 NOTE — BH INPATIENT PSYCHIATRY PROGRESS NOTE - PRN MEDS
MEDICATIONS  (PRN):  haloperidol     Tablet 2 milliGRAM(s) Oral every 6 hours PRN Mod Agitation  traZODone 50 milliGRAM(s) Oral at bedtime PRN insomnia

## 2023-02-02 NOTE — BH INPATIENT PSYCHIATRY PROGRESS NOTE - NSBHCHARTREVIEWVS_PSY_A_CORE FT
Vital Signs Last 24 Hrs  T(C): 36.5 (02-02-23 @ 07:01), Max: 36.5 (02-02-23 @ 07:01)  T(F): 97.7 (02-02-23 @ 07:01), Max: 97.7 (02-02-23 @ 07:01)  HR: --  BP: --  BP(mean): --  RR: 16 (02-02-23 @ 07:01) (16 - 16)  SpO2: 100% (02-02-23 @ 07:01) (100% - 100%)    Orthostatic VS  02-02-23 @ 07:01  Lying BP: --/-- HR: --  Sitting BP: 119/70 HR: 73  Standing BP: 108/75 HR: 99  Site: upper right arm  Mode: electronic  Orthostatic VS  02-01-23 @ 07:01  Lying BP: --/-- HR: --  Sitting BP: 110/67 HR: 75  Standing BP: 113/62 HR: 100  Site: upper right arm  Mode: electronic

## 2023-02-02 NOTE — BH INPATIENT PSYCHIATRY PROGRESS NOTE - NSBHTIMEACTIVITIESPERFORMED_PSY_A_CORE
1. interviewed the pt to assess current mental status  2. reviewed medications and reviewed information about the use of medication and potential side effect pt declined need for injectabl    3. . conferred with  social work ,pt homeless

## 2023-02-02 NOTE — BH INPATIENT PSYCHIATRY PROGRESS NOTE - CURRENT MEDICATION
MEDICATIONS  (STANDING):  benztropine 0.5 milliGRAM(s) Oral three times a day  cholecalciferol 2000 Unit(s) Oral at bedtime  divalproex  milliGRAM(s) Oral at bedtime  haloperidol     Tablet 10 milliGRAM(s) Oral two times a day  haloperidol    Injectable 5 milliGRAM(s) IntraMuscular once  LORazepam   Injectable 2 milliGRAM(s) IntraMuscular once    MEDICATIONS  (PRN):  haloperidol     Tablet 2 milliGRAM(s) Oral every 6 hours PRN Mod Agitation  traZODone 50 milliGRAM(s) Oral at bedtime PRN insomnia

## 2023-02-03 PROCEDURE — 99233 SBSQ HOSP IP/OBS HIGH 50: CPT

## 2023-02-03 RX ORDER — SENNA PLUS 8.6 MG/1
1 TABLET ORAL AT BEDTIME
Refills: 0 | Status: DISCONTINUED | OUTPATIENT
Start: 2023-02-03 | End: 2023-02-10

## 2023-02-03 RX ADMIN — Medication 0.5 MILLIGRAM(S): at 13:27

## 2023-02-03 RX ADMIN — SENNA PLUS 1 TABLET(S): 8.6 TABLET ORAL at 20:22

## 2023-02-03 RX ADMIN — Medication 0.5 MILLIGRAM(S): at 09:23

## 2023-02-03 RX ADMIN — Medication 0.5 MILLIGRAM(S): at 20:23

## 2023-02-03 RX ADMIN — HALOPERIDOL DECANOATE 10 MILLIGRAM(S): 100 INJECTION INTRAMUSCULAR at 20:22

## 2023-02-03 RX ADMIN — Medication 50 MILLIGRAM(S): at 20:23

## 2023-02-03 RX ADMIN — DIVALPROEX SODIUM 750 MILLIGRAM(S): 500 TABLET, DELAYED RELEASE ORAL at 20:22

## 2023-02-03 RX ADMIN — HALOPERIDOL DECANOATE 10 MILLIGRAM(S): 100 INJECTION INTRAMUSCULAR at 09:23

## 2023-02-03 RX ADMIN — Medication 2000 UNIT(S): at 20:23

## 2023-02-03 NOTE — BH INPATIENT PSYCHIATRY PROGRESS NOTE - NSBHASSESSSUMMFT_PSY_ALL_CORE
Pt with decreasing psychosis, as the pt with decreased frequency of response to internal stimuli . Pt with increased goal directed  linear speech .

## 2023-02-03 NOTE — BH INPATIENT PSYCHIATRY PROGRESS NOTE - NSBHCHARTREVIEWVS_PSY_A_CORE FT
Vital Signs Last 24 Hrs  T(C): 36.3 (02-03-23 @ 06:58), Max: 36.3 (02-03-23 @ 06:58)  T(F): 97.4 (02-03-23 @ 06:58), Max: 97.4 (02-03-23 @ 06:58)  HR: --  BP: --  BP(mean): --  RR: 16 (02-03-23 @ 06:58) (16 - 16)  SpO2: 99% (02-03-23 @ 06:58) (99% - 99%)    Orthostatic VS  02-03-23 @ 06:58  Lying BP: --/-- HR: --  Sitting BP: 108/69 HR: 80  Standing BP: 131/81 HR: 83  Site: upper right arm  Mode: electronic  Orthostatic VS  02-02-23 @ 07:01  Lying BP: --/-- HR: --  Sitting BP: 119/70 HR: 73  Standing BP: 108/75 HR: 99  Site: upper right arm  Mode: electronic

## 2023-02-03 NOTE — BH INPATIENT PSYCHIATRY PROGRESS NOTE - CURRENT MEDICATION
MEDICATIONS  (STANDING):  benztropine 0.5 milliGRAM(s) Oral three times a day  cholecalciferol 2000 Unit(s) Oral at bedtime  divalproex  milliGRAM(s) Oral at bedtime  haloperidol     Tablet 10 milliGRAM(s) Oral two times a day  haloperidol    Injectable 5 milliGRAM(s) IntraMuscular once  LORazepam   Injectable 2 milliGRAM(s) IntraMuscular once  senna 1 Tablet(s) Oral at bedtime    MEDICATIONS  (PRN):  haloperidol     Tablet 2 milliGRAM(s) Oral every 6 hours PRN Mod Agitation  traZODone 50 milliGRAM(s) Oral at bedtime PRN insomnia

## 2023-02-03 NOTE — BH INPATIENT PSYCHIATRY PROGRESS NOTE - NSBHTIMEACTIVITIESPERFORMED_PSY_A_CORE
1. interviewed the pt to assess current mental status  2. reviewed medications   , spoke again with pt about use of haldol decanoate but pt declined   3. reviewed lab results   4. conferred with nursing concerning pt overall behavior    5. conferred with socal work for aftercare planning , pt homeless

## 2023-02-04 RX ADMIN — Medication 50 MILLIGRAM(S): at 21:00

## 2023-02-04 RX ADMIN — HALOPERIDOL DECANOATE 10 MILLIGRAM(S): 100 INJECTION INTRAMUSCULAR at 21:00

## 2023-02-04 RX ADMIN — SENNA PLUS 1 TABLET(S): 8.6 TABLET ORAL at 21:00

## 2023-02-04 RX ADMIN — DIVALPROEX SODIUM 750 MILLIGRAM(S): 500 TABLET, DELAYED RELEASE ORAL at 21:00

## 2023-02-04 RX ADMIN — HALOPERIDOL DECANOATE 10 MILLIGRAM(S): 100 INJECTION INTRAMUSCULAR at 09:18

## 2023-02-04 RX ADMIN — Medication 50 MILLIGRAM(S): at 00:14

## 2023-02-04 RX ADMIN — Medication 2000 UNIT(S): at 21:00

## 2023-02-04 RX ADMIN — HALOPERIDOL DECANOATE 2 MILLIGRAM(S): 100 INJECTION INTRAMUSCULAR at 00:14

## 2023-02-04 RX ADMIN — Medication 0.5 MILLIGRAM(S): at 09:17

## 2023-02-04 RX ADMIN — Medication 0.5 MILLIGRAM(S): at 21:00

## 2023-02-04 RX ADMIN — Medication 0.5 MILLIGRAM(S): at 13:32

## 2023-02-05 RX ADMIN — HALOPERIDOL DECANOATE 10 MILLIGRAM(S): 100 INJECTION INTRAMUSCULAR at 09:09

## 2023-02-05 RX ADMIN — Medication 0.5 MILLIGRAM(S): at 21:15

## 2023-02-05 RX ADMIN — Medication 50 MILLIGRAM(S): at 21:14

## 2023-02-05 RX ADMIN — Medication 0.5 MILLIGRAM(S): at 09:09

## 2023-02-05 RX ADMIN — Medication 0.5 MILLIGRAM(S): at 12:29

## 2023-02-05 RX ADMIN — DIVALPROEX SODIUM 750 MILLIGRAM(S): 500 TABLET, DELAYED RELEASE ORAL at 21:15

## 2023-02-05 RX ADMIN — HALOPERIDOL DECANOATE 10 MILLIGRAM(S): 100 INJECTION INTRAMUSCULAR at 21:15

## 2023-02-05 RX ADMIN — Medication 2000 UNIT(S): at 21:14

## 2023-02-05 RX ADMIN — SENNA PLUS 1 TABLET(S): 8.6 TABLET ORAL at 21:14

## 2023-02-06 PROCEDURE — 99232 SBSQ HOSP IP/OBS MODERATE 35: CPT

## 2023-02-06 RX ORDER — DIPHENHYDRAMINE HYDROCHLORIDE AND LIDOCAINE HYDROCHLORIDE AND ALUMINUM HYDROXIDE AND MAGNESIUM HYDRO
20 KIT
Refills: 0 | Status: DISCONTINUED | OUTPATIENT
Start: 2023-02-06 | End: 2023-02-10

## 2023-02-06 RX ORDER — ACETAMINOPHEN 500 MG
650 TABLET ORAL EVERY 6 HOURS
Refills: 0 | Status: DISCONTINUED | OUTPATIENT
Start: 2023-02-06 | End: 2023-02-10

## 2023-02-06 RX ADMIN — HALOPERIDOL DECANOATE 10 MILLIGRAM(S): 100 INJECTION INTRAMUSCULAR at 09:07

## 2023-02-06 RX ADMIN — Medication 0.5 MILLIGRAM(S): at 09:07

## 2023-02-06 RX ADMIN — Medication 2000 UNIT(S): at 21:46

## 2023-02-06 RX ADMIN — SENNA PLUS 1 TABLET(S): 8.6 TABLET ORAL at 21:46

## 2023-02-06 RX ADMIN — Medication 0.5 MILLIGRAM(S): at 21:47

## 2023-02-06 RX ADMIN — DIVALPROEX SODIUM 750 MILLIGRAM(S): 500 TABLET, DELAYED RELEASE ORAL at 21:47

## 2023-02-06 RX ADMIN — Medication 50 MILLIGRAM(S): at 21:46

## 2023-02-06 RX ADMIN — HALOPERIDOL DECANOATE 10 MILLIGRAM(S): 100 INJECTION INTRAMUSCULAR at 21:46

## 2023-02-06 RX ADMIN — Medication 0.5 MILLIGRAM(S): at 12:37

## 2023-02-06 NOTE — BH INPATIENT PSYCHIATRY PROGRESS NOTE - CURRENT MEDICATION
MEDICATIONS  (STANDING):  benztropine 0.5 milliGRAM(s) Oral three times a day  cholecalciferol 2000 Unit(s) Oral at bedtime  divalproex  milliGRAM(s) Oral at bedtime  haloperidol     Tablet 10 milliGRAM(s) Oral two times a day  haloperidol    Injectable 5 milliGRAM(s) IntraMuscular once  senna 1 Tablet(s) Oral at bedtime    MEDICATIONS  (PRN):  haloperidol     Tablet 2 milliGRAM(s) Oral every 6 hours PRN Mod Agitation  traZODone 50 milliGRAM(s) Oral at bedtime PRN insomnia

## 2023-02-06 NOTE — BH INPATIENT PSYCHIATRY PROGRESS NOTE - NSBHASSESSSUMMFT_PSY_ALL_CORE
Pt continues to decline to give information and consent to contact any collaterals. Pt continues to indicate that he is homeless.  Pt working with CSW on aftercare planning

## 2023-02-06 NOTE — BH INPATIENT PSYCHIATRY PROGRESS NOTE - NSBHCHARTREVIEWVS_PSY_A_CORE FT
Vital Signs Last 24 Hrs  T(C): 36.4 (02-06-23 @ 07:27), Max: 36.4 (02-06-23 @ 07:27)  T(F): 97.5 (02-06-23 @ 07:27), Max: 97.5 (02-06-23 @ 07:27)  HR: --  BP: --  BP(mean): --  RR: 16 (02-06-23 @ 07:27) (16 - 16)  SpO2: 100% (02-06-23 @ 07:27) (100% - 100%)    Orthostatic VS  02-06-23 @ 07:27  Lying BP: --/-- HR: --  Sitting BP: 109/69 HR: 76  Standing BP: 122/72 HR: 92  Site: upper right arm  Mode: electronic  Orthostatic VS  02-05-23 @ 07:18  Lying BP: --/-- HR: --  Sitting BP: 135/65 HR: 65  Standing BP: 128/72 HR: 81  Site: upper right arm  Mode: electronic

## 2023-02-07 PROCEDURE — 99233 SBSQ HOSP IP/OBS HIGH 50: CPT

## 2023-02-07 RX ADMIN — HALOPERIDOL DECANOATE 10 MILLIGRAM(S): 100 INJECTION INTRAMUSCULAR at 10:05

## 2023-02-07 RX ADMIN — Medication 50 MILLIGRAM(S): at 20:30

## 2023-02-07 RX ADMIN — Medication 0.5 MILLIGRAM(S): at 20:30

## 2023-02-07 RX ADMIN — DIVALPROEX SODIUM 750 MILLIGRAM(S): 500 TABLET, DELAYED RELEASE ORAL at 20:29

## 2023-02-07 RX ADMIN — Medication 2000 UNIT(S): at 20:29

## 2023-02-07 RX ADMIN — DIPHENHYDRAMINE HYDROCHLORIDE AND LIDOCAINE HYDROCHLORIDE AND ALUMINUM HYDROXIDE AND MAGNESIUM HYDRO 20 MILLILITER(S): KIT at 01:07

## 2023-02-07 RX ADMIN — HALOPERIDOL DECANOATE 10 MILLIGRAM(S): 100 INJECTION INTRAMUSCULAR at 20:32

## 2023-02-07 RX ADMIN — Medication 0.5 MILLIGRAM(S): at 13:42

## 2023-02-07 RX ADMIN — SENNA PLUS 1 TABLET(S): 8.6 TABLET ORAL at 20:30

## 2023-02-07 RX ADMIN — Medication 0.5 MILLIGRAM(S): at 10:04

## 2023-02-07 RX ADMIN — DIPHENHYDRAMINE HYDROCHLORIDE AND LIDOCAINE HYDROCHLORIDE AND ALUMINUM HYDROXIDE AND MAGNESIUM HYDRO 20 MILLILITER(S): KIT at 20:30

## 2023-02-07 NOTE — BH INPATIENT PSYCHIATRY PROGRESS NOTE - PRN MEDS
MEDICATIONS  (PRN):  acetaminophen     Tablet .. 650 milliGRAM(s) Oral every 6 hours PRN Temp greater or equal to 38C (100.4F), Mild Pain (1 - 3)  FIRST- Mouthwash  BLM 20 milliLiter(s) Swish and Spit two times a day PRN Mouth Care  haloperidol     Tablet 2 milliGRAM(s) Oral every 6 hours PRN Mod Agitation  traZODone 50 milliGRAM(s) Oral at bedtime PRN insomnia

## 2023-02-07 NOTE — BH INPATIENT PSYCHIATRY PROGRESS NOTE - NSBHTIMEACTIVITIESPERFORMED_PSY_A_CORE
1. interviewed the pt to assess current mental status  2. reviewed medications  and educated pt about the use of medication and potential side effects  3. reviewed lab results   4. conferred with therapist about pt attendance and participation  5. conferred with social work for aftercare plans

## 2023-02-07 NOTE — BH INPATIENT PSYCHIATRY PROGRESS NOTE - CURRENT MEDICATION
MEDICATIONS  (STANDING):  benztropine 0.5 milliGRAM(s) Oral three times a day  cholecalciferol 2000 Unit(s) Oral at bedtime  divalproex  milliGRAM(s) Oral at bedtime  haloperidol     Tablet 10 milliGRAM(s) Oral two times a day  haloperidol    Injectable 5 milliGRAM(s) IntraMuscular once  senna 1 Tablet(s) Oral at bedtime    MEDICATIONS  (PRN):  acetaminophen     Tablet .. 650 milliGRAM(s) Oral every 6 hours PRN Temp greater or equal to 38C (100.4F), Mild Pain (1 - 3)  FIRST- Mouthwash  BLM 20 milliLiter(s) Swish and Spit two times a day PRN Mouth Care  haloperidol     Tablet 2 milliGRAM(s) Oral every 6 hours PRN Mod Agitation  traZODone 50 milliGRAM(s) Oral at bedtime PRN insomnia

## 2023-02-07 NOTE — BH INPATIENT PSYCHIATRY PROGRESS NOTE - NSBHASSESSSUMMFT_PSY_ALL_CORE
Pt still with no consent for family involvement . Pt still listing himself as homeless. Pt is improved with decreased agitation but still with response to internal stimuli

## 2023-02-08 PROCEDURE — 99233 SBSQ HOSP IP/OBS HIGH 50: CPT

## 2023-02-08 RX ADMIN — HALOPERIDOL DECANOATE 10 MILLIGRAM(S): 100 INJECTION INTRAMUSCULAR at 09:45

## 2023-02-08 RX ADMIN — Medication 650 MILLIGRAM(S): at 21:21

## 2023-02-08 RX ADMIN — Medication 0.5 MILLIGRAM(S): at 21:20

## 2023-02-08 RX ADMIN — Medication 0.5 MILLIGRAM(S): at 12:34

## 2023-02-08 RX ADMIN — SENNA PLUS 1 TABLET(S): 8.6 TABLET ORAL at 21:21

## 2023-02-08 RX ADMIN — Medication 0.5 MILLIGRAM(S): at 09:47

## 2023-02-08 RX ADMIN — Medication 50 MILLIGRAM(S): at 22:13

## 2023-02-08 RX ADMIN — DIVALPROEX SODIUM 750 MILLIGRAM(S): 500 TABLET, DELAYED RELEASE ORAL at 21:20

## 2023-02-08 RX ADMIN — Medication 650 MILLIGRAM(S): at 21:51

## 2023-02-08 RX ADMIN — HALOPERIDOL DECANOATE 10 MILLIGRAM(S): 100 INJECTION INTRAMUSCULAR at 21:20

## 2023-02-08 RX ADMIN — Medication 2000 UNIT(S): at 21:20

## 2023-02-08 NOTE — BH TREATMENT PLAN - NSTXPATIENTPARTICIPATE_PSY_ALL_CORE
Patient participated in defining interventions
Patient participated in identification of needs/problems/goals for treatment
Patient participated in identification of needs/problems/goals for treatment/Patient participated in defining interventions

## 2023-02-08 NOTE — BH TREATMENT PLAN - NSTXPSYCHOINTERMD_PSY_ALL_CORE
continue with haldol for psychosis , pt declined the need for decanoate 
continue with haldol for psychosis , pt declined the need for decanoate 
pt started on haldol

## 2023-02-08 NOTE — BH TREATMENT PLAN - NSBHPRIMARYDX_PSY_ALL_CORE
Schizoaffective disorder, depressive type    

## 2023-02-08 NOTE — BH TREATMENT PLAN - NSTXPLANTHERAPYSESSIONSFT_PSY_ALL_CORE
01-26-23  Type of therapy: N/A  Type of session: N/A  Level of patient participation: Resistance to participation  Duration of participation: 0  Therapy conducted by: Psych rehab  Therapy Summary: Patient sleeping throughout the day and did not attend group programming. Asocial when out on the unit. Observed to be talking to himself quietly as he paced the hallway.    01-27-23  Type of therapy: Self esteem  Type of session: Group  Level of patient participation: Not engaged  Duration of participation: 15 minutes  Therapy conducted by: Psych rehab  Therapy Summary: Patient agreed to attend group therapy today. Appeared distracted. Got up to get a paper towel and rolled it into a cone to clean his ears. Left the group and did not return.    01-29-23  Type of therapy: Leisure development  Type of session: Group  Level of patient participation: Participates  Duration of participation: 60 minutes  Therapy conducted by: Other (specify),Recreation Therapist  Therapy Summary: Patient attended leisure skills based group today where individuals were given the opportunity to select a leisure activity to participate in such as art, music, writing, and puzzles. Barron was observed briefly drawing and appeared engaged in the music that he selected. However, throughout group, frequently appeared to respond to internal stimuli such as talking to himself and laughing.    01-30-23  Type of therapy: Coping skills,Self esteem  Type of session: Group  Level of patient participation: Disruptive,Resistance to participation  Duration of participation: Less than 15 minutes  Therapy conducted by: Psych rehab  Therapy Summary: Patient attended Morning Perspective and negative self talk group and afternoon mindful coping skills group, but did not stay for the full group time. Patient struggled with engaging meaningfully in the group topics and was easily distracted and tangential in thought    01-31-23  Type of therapy: EXCUSED  Type of session: Group,EXCUSED  Level of patient participation: EXCUSED  Duration of participation: 0  Therapy conducted by: Psych rehab  Therapy Summary: Patient was excused from all psych rehab groups due to severity of their symptoms preventing them from meaningfully engaging in group topics.  
  02-03-23  Type of therapy: Coping skills,Self esteem  Type of session: Group  Level of patient participation: Resistance to participation  Duration of participation: Less than 15 minutes  Therapy conducted by: Psych rehab  Therapy Summary: Patient attended first 5 minutes of afternoon creative arts therapy group and then left.    02-04-23  --  --  --  --  --  Therapy Summary: Patient encouraged to participate in psych rehab groups but declined.    02-06-23  Type of therapy: Creative arts therapy,Mindfulness  Type of session: Group  Level of patient participation: Engaged,Participates  Duration of participation: 60 minutes  Therapy conducted by: Psych rehab  Therapy Summary: Patient attended Afternoon Creative Arts Therapy Mindfulness group and required some encouragement from staff to participate in the group topic.    02-07-23  Type of therapy: Coping skills  Type of session: Group  Level of patient participation: Participates  Duration of participation: 60 minutes  Therapy conducted by: Psych rehab  Therapy Summary: Patient attended recreational programming and enjoys listening to music. Declines to attend structured, or discussion based groups. Patient observed to be talking, or laughing to himself at times. Has been more aware of his environment overall and minimally social with writer.

## 2023-02-08 NOTE — BH INPATIENT PSYCHIATRY PROGRESS NOTE - NSBHTIMEACTIVITIESPERFORMED_PSY_A_CORE
1. interviewed the pt to assess current mental status  2. reviewed medications with the pt concerning the use of medication and of potential side effects   3. conferred with psychotherapist and the nursing staff concerning pt 's overall behavior on the unit  pt is less isolated but still with episodic response to internal stimuli  5. conferred with  social work as the pt is homeless and needing support for shelter.

## 2023-02-08 NOTE — BH TREATMENT PLAN - NSTXCOPEINTERPR_PSY_ALL_CORE
Patient sporadically attends parts of psych rehab groups, but is struggling to engage in the group topics and requires constant redirection from staff.
Continue to encourage patient to actively participate in 1-2 psych rehab groups daily to improve coping skills.

## 2023-02-08 NOTE — BH INPATIENT PSYCHIATRY PROGRESS NOTE - NSBHCHARTREVIEWVS_PSY_A_CORE FT
Vital Signs Last 24 Hrs  T(C): 36.7 (02-08-23 @ 06:59), Max: 36.7 (02-08-23 @ 06:59)  T(F): 98 (02-08-23 @ 06:59), Max: 98 (02-08-23 @ 06:59)  HR: --  BP: --  BP(mean): --  RR: 16 (02-08-23 @ 06:59) (16 - 16)  SpO2: 99% (02-08-23 @ 06:59) (99% - 99%)    Orthostatic VS  02-08-23 @ 06:59  Lying BP: --/-- HR: --  Sitting BP: 139/62 HR: 77  Standing BP: 119/66 HR: 87  Site: upper right arm  Mode: electronic  Orthostatic VS  02-07-23 @ 06:44  Lying BP: --/-- HR: --  Sitting BP: 110/62 HR: 67  Standing BP: 111/66 HR: 73  Site: upper right arm  Mode: electronic

## 2023-02-08 NOTE — BH TREATMENT PLAN - NSTXDCOTHRGOAL_PSY_ALL_CORE
Patient will be referred to the appropriate level of outpatient treatment and have appointments within 3-5 days of discharge.  Patient will be discharged to safe housing either back home or DSS emergency housing.  Patient will be seen by the medicaid specialist for benefits if needed   will explore need for duel diagnosis tx with appointments if needed.
Patient will be referred to the appropriate level of outpatient treatment and have appointments within 3-5 days of discharge.  Patient will be discharged to safe housing either back home or DSS emergency housing.  Patient will be seen by the medicaid specialist for benefits if needed   will explore need for duel diagnosis tx with appointments if needed.
O-T Advancement Flap Text: The defect edges were debeveled with a #15 scalpel blade.  Given the location of the defect, shape of the defect and the proximity to free margins an O-T advancement flap was deemed most appropriate.  Using a sterile surgical marker, an appropriate advancement flap was drawn incorporating the defect and placing the expected incisions within the relaxed skin tension lines where possible.    The area thus outlined was incised deep to adipose tissue with a #15 scalpel blade.  The skin margins were undermined to an appropriate distance in all directions utilizing iris scissors.

## 2023-02-08 NOTE — BH TREATMENT PLAN - NSDCCRITERIA_PSY_ALL_CORE
On discharge stable and no S/H/I/P, no delusional beliefs or being depressed

## 2023-02-08 NOTE — BH TREATMENT PLAN - NSTXANXGOAL_PSY_ALL_CORE
Patient Information:  Patient Name:   Address: Marlee Rush, ([id number])  [address] [place] [address] [address] [place] [id number]  [phone] (home)  Sex: female          : 1954   ________________________________________________________________  Referral Information:  Order Date: Mar 27, 2024       Epic Order #: [id number]   Referral Type: Physical Therapy Referral - External Dx: Lumbar spondylolysis (M43.06)  Spinal stenosis of lumbar region without neurogenic claudication (M48.061)   Signed Referral [place]:        Comments: Recommend focus on a multi-modal approach to pain management and functional improvement. Initial sessions will involve a comprehensive evaluation to establish baseline measurements for pain, range of motion, and functional limitations. Subsequent treatments will consist of modalities like heat/cold therapy and TENS for immediate pain relief, as well as targeted manual therapy techniques for muscle relaxation. A tailored exercise regimen will be introduced, prioritizing lumbar stabilization exercises, core strengthening, and flexibility training to improve range of motion. Ergonomic and postural education will be integral to the plan, and patients will be advised on body mechanics for activities of daily living. The ultimate goal will be to transition the patient to a home exercise program that they can sustain independently for long-term relief and prevention. Progress will be periodically reviewed, and the treatment plan will be adjusted accordingly. Sessions are recommended twice a week for the first 4-6 weeks, followed by a re-evaluation for potential tapering based on symptomatic and functional improvement.  Number of Visits: 16           ______________________________________________________________  Scheduling Information:           **REFERRAL REQUEST**     Your physician has referred you to a specialist.  Your physician or the clinic staff will provide you with the phone number you 
should call to schedule your appointment.      If you are confident that your benefit plan will not require a referral or authorization, such as [place] Medicaid, please feel free to schedule your appointment immediately. However, if you are unsure about the requirements for authorization, please wait 5-7 days and then contact your physician's office. At that time, you will be provided with any authorization numbers or be assured that none are required. You can then schedule your appointment. Failure to obtain required authorization numbers can create reimbursement difficulties for you.     _______________________________________________________        Coverage Information:      Active Insurance as of 3/27/2024             Primary Coverage      Payor Plan Insurance Group Employer/Plan Group     MEDICARE MEDICARE PART B ONLY [id number]       Payor Plan Address Payor Plan Phone Number Payor Plan Fax Number Effective Dates     [address]     7/1/2020 - None Entered     [facility] [id number]           Subscriber Name Subscriber Birth Date Member ID          FLORINA OJEDA 7/7/1954 [id number]       Guarantor Name (ID) Guarantor Birth Date Guarantor Address Guarantor Type     FLORINA OJEDA ([id number]) 7/7/1954 [address] Personal/Family         [facility] [id number]                     Secondary Coverage      Payor Plan Insurance Group Employer/Plan Group     AETNA INS AETNA MEDICARE SUPPLEMENT PLAN G       Payor Plan Address Payor Plan Phone Number Payor Plan Fax Number Effective Dates     [address] [phone]   7/1/2020 - None Entered     [facility] [address]           Subscriber Name Subscriber Birth Date Member ID          FLORINA OJEDA 7/7/1954 [id number]       Guarantor Name (ID) Guarantor Birth Date Guarantor Address Guarantor Type     FLORINA OJEDA ([id number]) 7/7/1954 [address] Personal/Family         [facility] [id number]                   Electronically Signed By: Gonzalo Guerra DO 
[NPI: [id number]]  Order Date: Mar 27, 2024 at 12:03 PM          
Be able to participate in activities despite lingering anxiety/panic
Be able to participate in activities despite lingering anxiety/panic

## 2023-02-08 NOTE — BH TREATMENT PLAN - NSTXDCOTHRINTERSW_PSY_ALL_CORE
TAI r'cd aftercare appt with [facility] in [place] for 2/13. TAI had trouble obtaining appt this week due to pt not having a cell phone and clinic not having a way to reach pt. TAI attempted to contact pt's CM @ [facility] but was informed that pt's case was closed in 2018. TAI left [facility] for CM dept @ [facility] to inquire on "releasing" pt so he could be enrolled in health home but no callback was r'cd. DSS referral sent today.

## 2023-02-08 NOTE — BH INPATIENT PSYCHIATRY PROGRESS NOTE - NSBHASSESSSUMMFT_PSY_ALL_CORE
28 y/o single male, homeless, unemployed, past psychiatric hx pf Schizophrenia/SAD, PTSD, Cannabis abuse, no prior SA,  Pt still declinging the need for his family to be involved with his care.  Pt is homeless and requiring medication and monitoring.

## 2023-02-08 NOTE — BH TREATMENT PLAN - NSTXCAREGIVERAGREEMENT_PSY_P_CORE
Patient does not have family/caregiver involved in care
No
Patient does not have family/caregiver involved in care

## 2023-02-09 PROCEDURE — 99232 SBSQ HOSP IP/OBS MODERATE 35: CPT

## 2023-02-09 RX ORDER — TRAZODONE HCL 50 MG
100 TABLET ORAL AT BEDTIME
Refills: 0 | Status: DISCONTINUED | OUTPATIENT
Start: 2023-02-09 | End: 2023-02-10

## 2023-02-09 RX ADMIN — DIVALPROEX SODIUM 750 MILLIGRAM(S): 500 TABLET, DELAYED RELEASE ORAL at 20:44

## 2023-02-09 RX ADMIN — Medication 650 MILLIGRAM(S): at 23:22

## 2023-02-09 RX ADMIN — Medication 0.5 MILLIGRAM(S): at 09:27

## 2023-02-09 RX ADMIN — HALOPERIDOL DECANOATE 10 MILLIGRAM(S): 100 INJECTION INTRAMUSCULAR at 09:27

## 2023-02-09 RX ADMIN — Medication 100 MILLIGRAM(S): at 20:44

## 2023-02-09 RX ADMIN — Medication 0.5 MILLIGRAM(S): at 20:45

## 2023-02-09 RX ADMIN — HALOPERIDOL DECANOATE 10 MILLIGRAM(S): 100 INJECTION INTRAMUSCULAR at 20:45

## 2023-02-09 RX ADMIN — Medication 2000 UNIT(S): at 20:45

## 2023-02-09 RX ADMIN — Medication 650 MILLIGRAM(S): at 05:52

## 2023-02-09 RX ADMIN — SENNA PLUS 1 TABLET(S): 8.6 TABLET ORAL at 20:44

## 2023-02-09 RX ADMIN — Medication 650 MILLIGRAM(S): at 04:15

## 2023-02-09 RX ADMIN — Medication 0.5 MILLIGRAM(S): at 12:29

## 2023-02-09 NOTE — BH INPATIENT PSYCHIATRY PROGRESS NOTE - NSBHCHARTREVIEWVS_PSY_A_CORE FT
Vital Signs Last 24 Hrs  T(C): 36.4 (02-09-23 @ 06:55), Max: 36.4 (02-09-23 @ 06:55)  T(F): 97.6 (02-09-23 @ 06:55), Max: 97.6 (02-09-23 @ 06:55)  HR: --  BP: --  BP(mean): --  RR: 16 (02-09-23 @ 06:55) (16 - 16)  SpO2: 100% (02-09-23 @ 06:55) (100% - 100%)    Orthostatic VS  02-09-23 @ 06:55  Lying BP: --/-- HR: --  Sitting BP: 124/60 HR: 59  Standing BP: 123/74 HR: 65  Site: upper right arm  Mode: electronic  Orthostatic VS  02-08-23 @ 06:59  Lying BP: --/-- HR: --  Sitting BP: 139/62 HR: 77  Standing BP: 119/66 HR: 87  Site: upper right arm  Mode: electronic   Vital Signs Last 24 Hrs  T(C): 36.5 (02-10-23 @ 06:47), Max: 36.5 (02-10-23 @ 06:47)  T(F): 97.7 (02-10-23 @ 06:47), Max: 97.7 (02-10-23 @ 06:47)  HR: --  BP: --  BP(mean): --  RR: 16 (02-10-23 @ 06:47) (16 - 16)  SpO2: 100% (02-10-23 @ 06:47) (100% - 100%)    Orthostatic VS  02-10-23 @ 06:47  Lying BP: --/-- HR: --  Sitting BP: 104/65 HR: 65  Standing BP: 122/70 HR: 81  Site: upper right arm  Mode: electronic  Orthostatic VS  02-09-23 @ 06:55  Lying BP: --/-- HR: --  Sitting BP: 124/60 HR: 59  Standing BP: 123/74 HR: 65  Site: upper right arm  Mode: electronic

## 2023-02-09 NOTE — BH DISCHARGE NOTE NURSING/SOCIAL WORK/PSYCH REHAB - NSCDUDCCRISIS_PSY_A_CORE
[facility] Well  1 (324) [facility]-WELL ([phone])  Text "WELL" to 78599  Website: www.[employer]/.[facility] 1 (525) 621-HOPE (9421) Website: www.safehorizon.org/.  [facility] - DASH – Crisis Care for Children, Adults and Families  [address] [place] [address]  Mobile Crisis Hotline – (706) 809-6056/.National Suicide Prevention Lifeline 5 (829) 815-2512/.  [facility]  1 (557) LIFEN[facility] (038-7338)/.  [facility]  (313) 943-5108/.  [facility] Behavioral Health Helpline / [facility] Mobile Crisis  (496) 834-FHQT (4876)/.  [facility] Response Crisis Hotline  (260) 560-8062  24 hour telephone crisis intervention and suicide prevention hotline concerned with all mental health issues/.  [facility] Behavioral Health Crisis Center  [address] [place] [address] [phone]) [phone]   Hours:  Monday through Friday from 9 AM to 3 PM/Other.../988 Suicide and Crisis Lifeline Pursue DSS for housing assistance as needed:  [facility] [address] [place] [address] ph. [phone] *Arrive early before 8am opening  EMERGENCY AFTER HOURS NUMBER: [phone]    Non-DSS shelter: [facility]  Address: [address] [place] [address]  Phone: (921) 137-8522    Department of Social Service centers:  [facility] [address] [place] [address] ph. [phone] *Arrive early before 8am opening  [facility] [address] [place] [address] Phone: [phone]  [facility] [address] [place] [address] Phone: [phone]  [facility] [address] [place] [address] Phone: [phone]/[facility] Well  1 (275) [facility]-WELL ([phone])  Text "WELL" to 90648  Website: www.[employer]/.[facility] 1 (846) 919-TYKE (9082) Website: www.safehorizon.org/.  [facility] - DASH – Crisis Care for Children, Adults and Families  [address] [place] [address]  Mobile Crisis Hotline – (746) 411-8495/.National Suicide Prevention Lifeline 2 (793) 357-4371/.  [facility]  1 (725) LIFENET (098-7126)/.  [facility]  (106) 348-7642/.  [facility] Behavioral Health Helpline / [facility] Mobile Crisis  (142) 404-RVBC (0296)/.  [facility] Response Crisis Hotline  (761) 389-4643  24 hour telephone crisis intervention and suicide prevention hotline concerned with all mental health issues/.  [facility] Behavioral Health Crisis Center  [address] [place] [address] [phone]   Hours:  Monday through Friday from 9 AM to 3 PM/Other.../988 Suicide and Crisis Lifeline

## 2023-02-09 NOTE — BH DISCHARGE NOTE NURSING/SOCIAL WORK/PSYCH REHAB - NSDCPEWEB_GEN_ALL_CORE
[facility] for Tobacco Control website --- http://[facility]/quitsmoking/[place] website --- www.[facility] Hospital[facility]wirefrnelsy.com

## 2023-02-09 NOTE — BH DISCHARGE NOTE NURSING/SOCIAL WORK/PSYCH REHAB - NSDPDISTO_PSY_ALL_CORE
Pt refused to wait for DSS placement, pt reports he will make his own arrangements for housing/Home Pt refused to wait for DSS placement, pt reports he will make his own arrangements for housing/Against medical advice

## 2023-02-09 NOTE — BH DISCHARGE NOTE NURSING/SOCIAL WORK/PSYCH REHAB - NSDCPLANREVIEWED_PSY_ALL_CORE
The discharge note was reviewed with the patient and the patient has agreed to receive a copy of the discharge note through the patient portal.

## 2023-02-09 NOTE — BH DISCHARGE NOTE NURSING/SOCIAL WORK/PSYCH REHAB - NSDCPEEMAIL_GEN_ALL_CORE
[facility] for Tobacco Control email [email or web address]

## 2023-02-09 NOTE — BH INPATIENT PSYCHIATRY PROGRESS NOTE - NSBHASSESSSUMMFT_PSY_ALL_CORE
26 y/o single male, homeless, unemployed, past psychiatric hx pf Schizophrenia/SAD, PTSD, Cannabis abuse, no prior SA,  Pt still declinging the need for his family to be involved with his care.  Pt is homeless and requiring medication and monitoring.  28 y/o single male, homeless, unemployed, past psychiatric hx pf Schizophrenia/SAD, PTSD, Cannabis abuse, no prior SA,  Pt still declining the need for his family to be involved with his care.  Pt is homeless but indicated he was in contact with an uncle but refused to allow treatment team to speak with family.

## 2023-02-09 NOTE — BH INPATIENT PSYCHIATRY PROGRESS NOTE - CURRENT MEDICATION
MEDICATIONS  (STANDING):  benztropine 0.5 milliGRAM(s) Oral three times a day  cholecalciferol 2000 Unit(s) Oral at bedtime  divalproex  milliGRAM(s) Oral at bedtime  haloperidol     Tablet 10 milliGRAM(s) Oral two times a day  haloperidol    Injectable 5 milliGRAM(s) IntraMuscular once  senna 1 Tablet(s) Oral at bedtime  traZODone 100 milliGRAM(s) Oral at bedtime    MEDICATIONS  (PRN):  acetaminophen     Tablet .. 650 milliGRAM(s) Oral every 6 hours PRN Temp greater or equal to 38C (100.4F), Mild Pain (1 - 3)  FIRST- Mouthwash  BLM 20 milliLiter(s) Swish and Spit two times a day PRN Mouth Care  haloperidol     Tablet 2 milliGRAM(s) Oral every 6 hours PRN Mod Agitation   MEDICATIONS  (STANDING):  benztropine 0.5 milliGRAM(s) Oral three times a day  cholecalciferol 2000 Unit(s) Oral at bedtime  divalproex  milliGRAM(s) Oral at bedtime  haloperidol     Tablet 10 milliGRAM(s) Oral two times a day  traZODone 100 milliGRAM(s) Oral at bedtime    MEDICATIONS  (PRN):

## 2023-02-09 NOTE — BH DISCHARGE NOTE NURSING/SOCIAL WORK/PSYCH REHAB - NSDCADDINFO1FT_PSY_ALL_CORE
You have an in person appt with Shana MAXWELL LMSW on 2/13 @ 1:30pm for an intake. Please call number above if you need to reschedule.

## 2023-02-09 NOTE — BH INPATIENT PSYCHIATRY PROGRESS NOTE - PRN MEDS
MEDICATIONS  (PRN):  acetaminophen     Tablet .. 650 milliGRAM(s) Oral every 6 hours PRN Temp greater or equal to 38C (100.4F), Mild Pain (1 - 3)  FIRST- Mouthwash  BLM 20 milliLiter(s) Swish and Spit two times a day PRN Mouth Care  haloperidol     Tablet 2 milliGRAM(s) Oral every 6 hours PRN Mod Agitation   MEDICATIONS  (PRN):

## 2023-02-09 NOTE — BH DISCHARGE NOTE NURSING/SOCIAL WORK/PSYCH REHAB - PATIENT PORTAL LINK FT
You can access the FollowMyHealth Patient Portal offered by [facility] by registering at the following website: [email or web address]. By joining [employer]’s FollowMyHealth portal, you will also be able to view your health information using other applications (apps) compatible with our system.

## 2023-02-10 VITALS — TEMPERATURE: 98 F | OXYGEN SATURATION: 100 % | RESPIRATION RATE: 16 BRPM

## 2023-02-10 PROCEDURE — 99239 HOSP IP/OBS DSCHRG MGMT >30: CPT

## 2023-02-10 RX ORDER — TRAZODONE HCL 50 MG
1 TABLET ORAL
Qty: 14 | Refills: 1
Start: 2023-02-10 | End: 2023-03-09

## 2023-02-10 RX ORDER — CHOLECALCIFEROL (VITAMIN D3) 125 MCG
2000 CAPSULE ORAL
Qty: 14 | Refills: 1
Start: 2023-02-10 | End: 2023-03-09

## 2023-02-10 RX ORDER — BENZTROPINE MESYLATE 1 MG
1 TABLET ORAL
Qty: 42 | Refills: 1
Start: 2023-02-10 | End: 2023-03-09

## 2023-02-10 RX ORDER — DIVALPROEX SODIUM 500 MG/1
3 TABLET, DELAYED RELEASE ORAL
Qty: 42 | Refills: 1
Start: 2023-02-10 | End: 2023-03-09

## 2023-02-10 RX ORDER — HALOPERIDOL DECANOATE 100 MG/ML
1 INJECTION INTRAMUSCULAR
Qty: 28 | Refills: 1
Start: 2023-02-10 | End: 2023-03-09

## 2023-02-10 RX ADMIN — HALOPERIDOL DECANOATE 10 MILLIGRAM(S): 100 INJECTION INTRAMUSCULAR at 09:12

## 2023-02-10 RX ADMIN — Medication 0.5 MILLIGRAM(S): at 09:13

## 2023-02-10 RX ADMIN — Medication 0.5 MILLIGRAM(S): at 12:43

## 2023-02-10 RX ADMIN — Medication 650 MILLIGRAM(S): at 14:30

## 2023-02-10 NOTE — BH INPATIENT PSYCHIATRY PROGRESS NOTE - NSTXANXDATEEST_PSY_ALL_CORE
25-Jan-2023

## 2023-02-10 NOTE — BH INPATIENT PSYCHIATRY PROGRESS NOTE - NSTXCOPEDATETRGT_PSY_ALL_CORE
01-Feb-2023
07-Feb-2023
01-Feb-2023
07-Feb-2023
07-Feb-2023
14-Feb-2023
07-Feb-2023
14-Feb-2023
07-Feb-2023
07-Feb-2023
14-Feb-2023
14-Feb-2023

## 2023-02-10 NOTE — BH INPATIENT PSYCHIATRY PROGRESS NOTE - NSDCCRITERIA_PSY_ALL_CORE
On discharge stable and no S/H/I/P, no delusional beliefs or being depressed

## 2023-02-10 NOTE — BH INPATIENT PSYCHIATRY DISCHARGE NOTE - REASON FOR ADMISSION
26 y/o single Black  male, homeless, unemployed, hx pf Schizophrenia/SAD, PTSD, Cannabis abuse, no prior SA, BIB/Self due to increasing paranoia.   He self reported past trials with Risperdal; Depakote; Seroquel; Invega Sustenna .Pt currently on haldol and is declining the need for decanoate .  Pt is currently with the three day letter . Although he is still with periods of talking to himself he has maintained behavioral control and is denying any suicidal or homicidal ideation intent or plan .  Pt verbalized willing to attend aftercare treatment,  Pt claiming he has family but declined to allow treatment staff to have any contact with family.  Pt with three day letter started in the weekend and pt was spoken about valproic acid level and pt declined need for next level but verbalized willing to go for follow up to include next serum level .  Pt with no sedation .

## 2023-02-10 NOTE — BH INPATIENT PSYCHIATRY PROGRESS NOTE - NSTXCOPEDATEEST_PSY_ALL_CORE
25-Jan-2023
25-Jan-2023
31-Jan-2023
31-Jan-2023
07-Feb-2023
07-Feb-2023
31-Jan-2023
07-Feb-2023
31-Jan-2023
07-Feb-2023
25-Jan-2023
31-Jan-2023
31-Jan-2023
25-Jan-2023

## 2023-02-10 NOTE — BH INPATIENT PSYCHIATRY PROGRESS NOTE - NSTXCOPEINTERMD_PSY_ALL_CORE
encourage to attend groups

## 2023-02-10 NOTE — BH INPATIENT PSYCHIATRY PROGRESS NOTE - NSBHATTESTBILLBASEDTIME_PSY_A_CORE
Time based billing

## 2023-02-10 NOTE — BH INPATIENT PSYCHIATRY PROGRESS NOTE - NSTXPSYCHOINTERMD_PSY_ALL_CORE
pt started on haldol
continue with haldol for psychosis , pt declined the need for decanoate 
pt started on haldol
continue with haldol for psychosis , pt declined the need for decanoate 
continue with haldol for psychosis , pt declined the need for decanoate 
pt started on haldol
continue with haldol for psychosis , pt declined the need for decanoate 
continue with haldol for psychosis , pt declined the need for decanoate 
pt started on haldol
pt started on haldol

## 2023-02-10 NOTE — BH INPATIENT PSYCHIATRY PROGRESS NOTE - NSICDXBHPRIMARYDX_PSY_ALL_CORE
Schizoaffective disorder, depressive type   F25.1  

## 2023-02-10 NOTE — BH INPATIENT PSYCHIATRY PROGRESS NOTE - NSBHCHARTREVIEWVS_PSY_A_CORE FT
Vital Signs Last 24 Hrs  T(C): 36.5 (02-10-23 @ 06:47), Max: 36.5 (02-10-23 @ 06:47)  T(F): 97.7 (02-10-23 @ 06:47), Max: 97.7 (02-10-23 @ 06:47)  HR: --  BP: --  BP(mean): --  RR: 16 (02-10-23 @ 06:47) (16 - 16)  SpO2: 100% (02-10-23 @ 06:47) (100% - 100%)    Orthostatic VS  02-10-23 @ 06:47  Lying BP: --/-- HR: --  Sitting BP: 104/65 HR: 65  Standing BP: 122/70 HR: 81  Site: upper right arm  Mode: electronic  Orthostatic VS  02-09-23 @ 06:55  Lying BP: --/-- HR: --  Sitting BP: 124/60 HR: 59  Standing BP: 123/74 HR: 65  Site: upper right arm  Mode: electronic

## 2023-02-10 NOTE — BH INPATIENT PSYCHIATRY PROGRESS NOTE - NSTXDCOTHRDATEEST_PSY_ALL_CORE
08-Feb-2023
26-Jan-2023
08-Feb-2023
26-Jan-2023
08-Feb-2023
02-Feb-2023
02-Feb-2023
26-Jan-2023
02-Feb-2023
02-Feb-2023

## 2023-02-10 NOTE — BH INPATIENT PSYCHIATRY DISCHARGE NOTE - NSDCMRMEDTOKEN_GEN_ALL_CORE_FT
benztropine 0.5 mg oral tablet: 1 tab(s) orally 3 times a day  cholecalciferol oral tablet: 2000 unit(s) orally once a day (at bedtime)  divalproex sodium 250 mg oral tablet, extended release: 3 tab(s) orally once a day (at bedtime)  haloperidol 10 mg oral tablet: 1 tab(s) orally 2 times a day  traZODone 100 mg oral tablet: 1 tab(s) orally once a day (at bedtime)

## 2023-02-10 NOTE — BH INPATIENT PSYCHIATRY PROGRESS NOTE - NSBHMSEAFFQUAL_PSY_A_CORE
Euthymic/Anxious
Depressed/Anxious
Depressed/Anxious
Euthymic/Anxious
Depressed/Anxious
Euthymic/Anxious
Depressed/Anxious
Euthymic/Anxious
Depressed/Anxious
Euthymic/Anxious

## 2023-02-10 NOTE — BH INPATIENT PSYCHIATRY DISCHARGE NOTE - HPI (INCLUDE ILLNESS QUALITY, SEVERITY, DURATION, TIMING, CONTEXT, MODIFYING FACTORS, ASSOCIATED SIGNS AND SYMPTOMS)
26 y/o single male, homeless, unemployed, past psychiatric hx pf Schizophrenia/SAD, PTSD, Cannabis abuse, no prior SA, no violence, no medical issues was BIB/Self due to increasing paranoia.     Patient presented to [facility], on arrival he was oddly related, guarded, unable to elaborate linear events prior to the hospital. Patient reports that he is "depressed" and "emotions are bad" and that he is "lagging" and "breaking out" . Patient states that he feels more paranoid and feels "danger and eeriness". Reports that he is pacing and walking prior to presenting to the hospital. There is notable latency to his answers and suspect that pt. is internally stimulated as he presents distracted. When asked about a/h replies "normally yeah" and does not answer when asked about now. Denies v/H, denies HI. he smokes 1 cigarette a day. uses Cannabis regularly, last use yesterday. He denies Alcohol or other substances. Denies on any Psychiatric medications or being engaged in any Psychiatric care. Reports he has no family/friends that we can reach.

## 2023-02-10 NOTE — BH INPATIENT PSYCHIATRY PROGRESS NOTE - NSBHTIMEACTIVITIESPERFORMED_PSY_A_CORE
1. interviewed the pt to assess current mental status, spoke with pt about his three day letter request and pt declined to recind the le  2. reviewed medications , pt declined decanoate, will order for indigent medications    3.  conferred with social work about  shelter and aftercare planning    4. conferred with  nursing concerning overall behavioral function while in the hospital.  1. interviewed the pt to assess current mental status, spoke with pt about his three day letter request and pt declined to recind the le  2. reviewed medications , pt declined decanoate, will order prescriptions   3.  conferred with social work about  shelter and aftercare planning    4. conferred with  nursing concerning overall behavioral function while in the hospital.

## 2023-02-10 NOTE — BH INPATIENT PSYCHIATRY PROGRESS NOTE - NSBHMETABOLIC_PSY_ALL_CORE_FT
BMI:   HbA1c: A1C with Estimated Average Glucose Result: 5.7 % (01-26-23 @ 09:21)    Glucose:   BP: --  Lipid Panel: Date/Time: 01-26-23 @ 09:21  Cholesterol, Serum: 154  Direct LDL: --  HDL Cholesterol, Serum: 49  Total Cholesterol/HDL Ration Measurement: --  Triglycerides, Serum: 79  
BMI: BMI (kg/m2): 25.9 (02-06-23 @ 22:06)  HbA1c: A1C with Estimated Average Glucose Result: 5.7 % (01-26-23 @ 09:21)    Glucose:   BP: --  Lipid Panel: Date/Time: 01-26-23 @ 09:21  Cholesterol, Serum: 154  Direct LDL: --  HDL Cholesterol, Serum: 49  Total Cholesterol/HDL Ration Measurement: --  Triglycerides, Serum: 79  
BMI: BMI (kg/m2): 25.9 (02-06-23 @ 22:06)  HbA1c: A1C with Estimated Average Glucose Result: 5.7 % (01-26-23 @ 09:21)    Glucose:   BP: --  Lipid Panel: Date/Time: 01-26-23 @ 09:21  Cholesterol, Serum: 154  Direct LDL: --  HDL Cholesterol, Serum: 49  Total Cholesterol/HDL Ration Measurement: --  Triglycerides, Serum: 79  
BMI:   HbA1c: A1C with Estimated Average Glucose Result: 5.7 % (01-26-23 @ 09:21)    Glucose:   BP: --  Lipid Panel: Date/Time: 01-26-23 @ 09:21  Cholesterol, Serum: 154  Direct LDL: --  HDL Cholesterol, Serum: 49  Total Cholesterol/HDL Ration Measurement: --  Triglycerides, Serum: 79  
BMI: BMI (kg/m2): 25.9 (02-06-23 @ 22:06)  HbA1c: A1C with Estimated Average Glucose Result: 5.7 % (01-26-23 @ 09:21)    Glucose:   BP: --  Lipid Panel: Date/Time: 01-26-23 @ 09:21  Cholesterol, Serum: 154  Direct LDL: --  HDL Cholesterol, Serum: 49  Total Cholesterol/HDL Ration Measurement: --  Triglycerides, Serum: 79  
BMI: BMI (kg/m2): 25.9 (02-06-23 @ 22:06)  HbA1c: A1C with Estimated Average Glucose Result: 5.7 % (01-26-23 @ 09:21)    Glucose:   BP: --  Lipid Panel: Date/Time: 01-26-23 @ 09:21  Cholesterol, Serum: 154  Direct LDL: --  HDL Cholesterol, Serum: 49  Total Cholesterol/HDL Ration Measurement: --  Triglycerides, Serum: 79  
BMI:   HbA1c: A1C with Estimated Average Glucose Result: 5.7 % (01-26-23 @ 09:21)    Glucose:   BP: --  Lipid Panel: Date/Time: 01-26-23 @ 09:21  Cholesterol, Serum: 154  Direct LDL: --  HDL Cholesterol, Serum: 49  Total Cholesterol/HDL Ration Measurement: --  Triglycerides, Serum: 79

## 2023-02-10 NOTE — BH INPATIENT PSYCHIATRY PROGRESS NOTE - NSICDXBHSECONDARYDX_PSY_ALL_CORE
Psychosis, bipolar affective   F31.9  

## 2023-02-10 NOTE — BH INPATIENT PSYCHIATRY PROGRESS NOTE - NSBHASSESSSUMMFT_PSY_ALL_CORE
27 yr old unemployed homeless Black male  with past psychiatric hx pf Schizophrenia/SAD, PTSD, Cannabis abuse, no prior SA, no violence, no medical issues was BIB self due to increasing paranoia. Pt at this time with improved eye contact, increased goal directed speech.  Pt is less preoccupied and is selectively interactive with peers.  Pt indicated that he has family but declined the need to give consent to contact his family .  P t denies any suicidal or homicidal ideation  intent or plan .  Pt has also declined the need for any long active injectable medication such as haldol decanote

## 2023-02-10 NOTE — BH INPATIENT PSYCHIATRY PROGRESS NOTE - NSBHMSEMOOD_PSY_A_CORE
Depressed/Anxious
Depressed/Anxious
Normal
Depressed/Anxious
Normal
Depressed/Anxious
Normal
Depressed/Anxious

## 2023-02-10 NOTE — BH INPATIENT PSYCHIATRY PROGRESS NOTE - NSBHFUPINTERVALCCFT_PSY_A_CORE
"I like to know when I'm going to be discharged "
 " I haven't been sleeping that well."    On 2/9/2023, the pt submitted a 72 hr letter requesting his discharge from hospital. 
Pt not answering questions, poor eye contact and walked away. 
"I'm doing better"
"I feel fine"
Pt not answering questions,
I am OK
"I'm feeling better"
"I'm doing better "
"I'm fine"
"you think I can now get medication to detox off of marijuana  and other drugs"
"I'm doing well"
"I want to leave today , no I will not stop my three day letter."
"when am I going to leave"

## 2023-02-10 NOTE — BH INPATIENT PSYCHIATRY DISCHARGE NOTE - NSDCCPCAREPLAN_GEN_ALL_CORE_FT
PRINCIPAL DISCHARGE DIAGNOSIS  Diagnosis: Schizoaffective disorder, depressive type  Assessment and Plan of Treatment:       SECONDARY DISCHARGE DIAGNOSES  Diagnosis: Psychosis, bipolar affective  Assessment and Plan of Treatment:

## 2023-02-10 NOTE — BH INPATIENT PSYCHIATRY PROGRESS NOTE - NSBHFUPINTERVALHXFT_PSY_A_CORE
Pt had submitted a three day letter and pt is declining to rescind the letter.  Pt has indicated that he has family but declines to allow any contact with them by treatment  team.  Pt has been compliant with medication and has not demonstrated any lost of behavioral control .  Pt denies any suicidal or homicidal ideation intent or plan, and denies any side effect from medication . He verbalized wiling to continue with medication and will be able to get 2 weeks supply of the haldol on discharge. Pt has improved goal  directed speech and is with improved eye contact and able to be able to make his needs known.  Pt continues with some symptoms as he is occasionally talking to himself he is not needing to remain in the hospital and can continue with aftercare appts. 
Pt with gradual decreased in psychosis .  Pt still with periods of response to internal stimuli but with decreased intensity and pt can be redirected. Speech is goal directed and he is less preoccupied. Pt still declining the need for injectable form of medication . He remains in behavioral control and denies any suicidal ideation intent or plan
Pt is more linear in speech .  Still with response to internal stimuli .  Pt denies any hallucination but still being seen but less frequent to be talking to himself or gesturing . Pt less guarded and suspicious and is able to given more of his own history but still not willing to consent for any contact with collaterals . Pt still despite education about use of medication and the use of decanoate  pt declined 
Pt denied any need for referral to program setting and indicated that he is willing to go to a shelter.  Pt still declining to have any contact with his family . Pt denies any side effects from medication
Pt with improved goal directed speech. Pt unwilling to give collateral information. Pt is homeless.  Pt with deniesl of any suicidal or homicidal ideation intent or plan 
Pt actively seen talking  and smiling to himself and gesturing to the window.  Pt responding to internal stimuli and remains impulsive and paranoid.   added depakote 500mg po hs and continued with haldol 5mg po TID
Covering MD Note ( 2/9/2023)  Pt seen . He remains rather disheveled  with limited attention to his grooming and to his ADLs.  The pt has been med compliant with his Haldol 10 mg po bid and Depakote  mg po qhs to alleviate symptoms of his functionally impairing affective psychosis. denied any need for referral to program setting and indicated that he is willing to go to a shelter.  Pt still declines  to allow hospital staff  to have any  contact with his family . Pt denies any side effects from medication but he has been med compliant while inpatient.  The pt continues to appear distracted and possibly internally preoccupied at times but much less so overall since beginning his current medication regimen. The pt has not reported any current SI /HI  and he denies any current plan to harm either himself or anyone else. 
Pt is improved and is not as intensely  responding to internal stimuli. Pt apparently has family and mentioned an uncl and cousin but claiming not able to stay with them and can not have  contact with them 
Pt still responding to internal stimuli.  Pt with less isolation and is often in the hallways and in the Day room .  Pt actively interacting with his hallucinations . Pt is not opposed to his hallucinations and seems to be enjoying his hallucinations   Pt denies any suicidal ideation intent or plan 
Pt still with response to internal stimuli but with less intensity .  Pt claiming "no hallucinations"  Pt smiling to self but not claiming any distress. 
Pt with decreased frequency of response to internal stimuli.  Pt denies any hallucination but still talking to self. 
 Pt is responding to internal stimuli but does not answer the questions about AH, or about his mood. He lacks spontaneity and makes poor eye contact.  No side effects reported or observed.
Pt with improved eye contact Less preoccupation .  Pt claiming no home or family support.  Pt denies any suicidal ideation intent or plan 
 Pt is responding to internal stimuli smiling and laughing  inappropriately.   When asked , denies  AH, modo is "good". Denies VH.  He lacks spontaneity and makes poor eye contact.

## 2023-02-10 NOTE — BH INPATIENT PSYCHIATRY PROGRESS NOTE - NSBHPSYCHOLCOGORIENT_PSY_A_CORE
Oriented to time, place, person, situation

## 2023-02-10 NOTE — BH INPATIENT PSYCHIATRY DISCHARGE NOTE - NSBHMETABOLIC_PSY_ALL_CORE_FT
BMI: BMI (kg/m2): 25.9 (02-06-23 @ 22:06)  HbA1c: A1C with Estimated Average Glucose Result: 5.7 % (01-26-23 @ 09:21)    Glucose:   BP: --  Lipid Panel: Date/Time: 01-26-23 @ 09:21  Cholesterol, Serum: 154  Direct LDL: --  HDL Cholesterol, Serum: 49  Total Cholesterol/HDL Ration Measurement: --  Triglycerides, Serum: 79

## 2023-02-10 NOTE — BH INPATIENT PSYCHIATRY PROGRESS NOTE - NSBHMSEPERCEPT_PSY_A_CORE
No abnormalities
Auditory hallucinations
No abnormalities
Auditory hallucinations
Auditory hallucinations
No abnormalities
Auditory hallucinations
No abnormalities
Auditory hallucinations

## 2023-02-10 NOTE — BH INPATIENT PSYCHIATRY PROGRESS NOTE - NSBHATTESTTYPEVISIT_PSY_A_CORE
Attending Only

## 2023-02-10 NOTE — BH INPATIENT PSYCHIATRY DISCHARGE NOTE - HOSPITAL COURSE
Pt is currently compliant with haldol and declines the need for converting the haldol to haldol decanoate . Pt denies any suicidal or homicidal ideation intent or plan Pt with no behavioral dyscontrol while in the hospital .  Pt verbalized willing to attend aftercare treatment

## 2023-02-10 NOTE — BH INPATIENT PSYCHIATRY PROGRESS NOTE - NSTXDCOTHRPROGRES_PSY_ALL_CORE
No Change
Improving
No Change
Improving
Improving
No Change
Improving
No Change

## 2023-02-10 NOTE — BH INPATIENT PSYCHIATRY PROGRESS NOTE - NSBHATTESTBILLING_PSY_A_CORE
Billing in another system
70547-Khcaeegykg OBS or IP - low complexity OR 25-34 mins
Billing in another system
Billing in another system
98338-Pcyvyftaod OBS or IP - low complexity OR 25-34 mins
Billing in another system

## 2023-02-10 NOTE — BH INPATIENT PSYCHIATRY PROGRESS NOTE - NSTXPROBDCOTHR_PSY_ALL_CORE
DISCHARGE ISSUE - OTHER

## 2023-02-10 NOTE — BH INPATIENT PSYCHIATRY PROGRESS NOTE - NSTXDCOTHRDATETRGT_PSY_ALL_CORE
02-Feb-2023
09-Feb-2023
15-Feb-2023
15-Feb-2023
09-Feb-2023
09-Feb-2023
02-Feb-2023
09-Feb-2023
15-Feb-2023
02-Feb-2023
02-Feb-2023

## 2023-02-10 NOTE — BH INPATIENT PSYCHIATRY PROGRESS NOTE - GENERAL APPEARANCE
No deformities present

## 2023-02-10 NOTE — CHART NOTE - NSCHARTNOTEFT_GEN_A_CORE
TAI contacted [facility] in AM, JOVANI has not been approved yet. TAI left contact number with  Georgina. TAI spoke with pt to inform him that placement is still pending and inquired if he can wait until Monday to ensure placement is received (pt submitted a 72 hr letter on 2/9). Pt stated that he is “too claustrophobic” on the unit, and he does not want to wait for placement. SW informed pt that he would not be able to access [facility] emergency housing after-hours or weekend due to referral already being in. Pt expressed understanding but still wants to be dc and stated he will make his own housing arrangements. SW informed psych MD, psych MD and TAI met with pt. SW again explained the benefit of rescinding 72 hr letter and waiting for placement that could possibly come today or Monday. Pt continues to decline waiting on placement and wants to arrange his own housing. Psych MD has no objection for pt to arrange their own housing therefore pt can be dc today. TAI spoke with Georgina again at [facility], reports that JOVANI is still being reviewed and it is unlikely JOVANI will be approved today. Please note, [facility] Referral was sent in the required 48 hrs. TAI contacted [facility] in AM, JOVANI has not been approved yet. TAI left contact number with  Georgina. TAI spoke with pt to inform him that placement is still pending and inquired if he can wait until Monday to ensure placement is received (pt submitted a 72 hr letter on 2/9). Pt stated that he is “too claustrophobic” on the unit, and he does not want to wait for placement. SW informed pt that he would not be able to access [facility] emergency housing after-hours or weekend due to referral already being in. Pt expressed understanding but still wants to be dc and stated he will make his own housing arrangements. SW informed psych MD, psych MD and TAI met with pt. TAI again explained the benefit of rescinding 72 hr letter and waiting for placement that could possibly come today or Monday. Pt continues to decline waiting on placement and wants to arrange his own housing. Psych MD has no objection for pt to arrange their own housing therefore pt can be dc today. TAI spoke with Georgina again at [facility], reports that JOVANI is still being reviewed and it is unlikely JOVANI will be approved today. Please note, [facility] Referral was sent in the required 48 hrs. TAI offered pt to call SW on Monday to obtain placement if he chooses to go, number on dc. Pt expressed understanding.

## 2023-02-11 ENCOUNTER — EMERGENCY (EMERGENCY)
Facility: HOSPITAL | Age: 28
LOS: 0 days | Discharge: HOME | End: 2023-02-12
Attending: EMERGENCY MEDICINE
Payer: MEDICAID

## 2023-02-11 VITALS
TEMPERATURE: 98 F | SYSTOLIC BLOOD PRESSURE: 122 MMHG | HEART RATE: 74 BPM | HEIGHT: 73 IN | WEIGHT: 139.99 LBS | RESPIRATION RATE: 18 BRPM | DIASTOLIC BLOOD PRESSURE: 63 MMHG | OXYGEN SATURATION: 100 %

## 2023-02-11 DIAGNOSIS — R11.2 NAUSEA WITH VOMITING, UNSPECIFIED: ICD-10-CM

## 2023-02-11 DIAGNOSIS — R07.9 CHEST PAIN, UNSPECIFIED: ICD-10-CM

## 2023-02-11 DIAGNOSIS — R40.4 TRANSIENT ALTERATION OF AWARENESS: ICD-10-CM

## 2023-02-11 DIAGNOSIS — F12.90 CANNABIS USE, UNSPECIFIED, UNCOMPLICATED: ICD-10-CM

## 2023-02-11 DIAGNOSIS — G52.7 DISORDERS OF MULTIPLE CRANIAL NERVES: ICD-10-CM

## 2023-02-11 DIAGNOSIS — J45.909 UNSPECIFIED ASTHMA, UNCOMPLICATED: ICD-10-CM

## 2023-02-11 DIAGNOSIS — R42 DIZZINESS AND GIDDINESS: ICD-10-CM

## 2023-02-11 DIAGNOSIS — R40.2410 GLASGOW COMA SCALE SCORE 13-15, UNSPECIFIED TIME: ICD-10-CM

## 2023-02-11 DIAGNOSIS — Z20.822 CONTACT WITH AND (SUSPECTED) EXPOSURE TO COVID-19: ICD-10-CM

## 2023-02-11 DIAGNOSIS — R29.700 NIHSS SCORE 0: ICD-10-CM

## 2023-02-11 DIAGNOSIS — Z59.00 HOMELESSNESS UNSPECIFIED: ICD-10-CM

## 2023-02-11 LAB
ALBUMIN SERPL ELPH-MCNC: 3.7 G/DL — SIGNIFICANT CHANGE UP (ref 3.3–5)
ALP SERPL-CCNC: 65 U/L — SIGNIFICANT CHANGE UP (ref 40–120)
ALT FLD-CCNC: 31 U/L — SIGNIFICANT CHANGE UP (ref 12–78)
ANION GAP SERPL CALC-SCNC: 3 MMOL/L — LOW (ref 5–17)
AST SERPL-CCNC: 20 U/L — SIGNIFICANT CHANGE UP (ref 15–37)
BASOPHILS # BLD AUTO: 0.02 K/UL — SIGNIFICANT CHANGE UP (ref 0–0.2)
BASOPHILS NFR BLD AUTO: 0.2 % — SIGNIFICANT CHANGE UP (ref 0–2)
BILIRUB SERPL-MCNC: 0.3 MG/DL — SIGNIFICANT CHANGE UP (ref 0.2–1.2)
BUN SERPL-MCNC: 19 MG/DL — SIGNIFICANT CHANGE UP (ref 7–23)
CALCIUM SERPL-MCNC: 9.1 MG/DL — SIGNIFICANT CHANGE UP (ref 8.5–10.1)
CHLORIDE SERPL-SCNC: 105 MMOL/L — SIGNIFICANT CHANGE UP (ref 96–108)
CO2 SERPL-SCNC: 33 MMOL/L — HIGH (ref 22–31)
CREAT SERPL-MCNC: 0.89 MG/DL — SIGNIFICANT CHANGE UP (ref 0.5–1.3)
EGFR: 121 ML/MIN/1.73M2 — SIGNIFICANT CHANGE UP
EOSINOPHIL # BLD AUTO: 0.17 K/UL — SIGNIFICANT CHANGE UP (ref 0–0.5)
EOSINOPHIL NFR BLD AUTO: 2 % — SIGNIFICANT CHANGE UP (ref 0–6)
FLUAV AG NPH QL: SIGNIFICANT CHANGE UP
FLUBV AG NPH QL: SIGNIFICANT CHANGE UP
GLUCOSE SERPL-MCNC: 93 MG/DL — SIGNIFICANT CHANGE UP (ref 70–99)
HCT VFR BLD CALC: 41 % — SIGNIFICANT CHANGE UP (ref 39–50)
HGB BLD-MCNC: 13.4 G/DL — SIGNIFICANT CHANGE UP (ref 13–17)
IMM GRANULOCYTES NFR BLD AUTO: 0.4 % — SIGNIFICANT CHANGE UP (ref 0–0.9)
LYMPHOCYTES # BLD AUTO: 2.21 K/UL — SIGNIFICANT CHANGE UP (ref 1–3.3)
LYMPHOCYTES # BLD AUTO: 26 % — SIGNIFICANT CHANGE UP (ref 13–44)
MAGNESIUM SERPL-MCNC: 2 MG/DL — SIGNIFICANT CHANGE UP (ref 1.6–2.6)
MCHC RBC-ENTMCNC: 28.9 PG — SIGNIFICANT CHANGE UP (ref 27–34)
MCHC RBC-ENTMCNC: 32.7 GM/DL — SIGNIFICANT CHANGE UP (ref 32–36)
MCV RBC AUTO: 88.4 FL — SIGNIFICANT CHANGE UP (ref 80–100)
MONOCYTES # BLD AUTO: 0.56 K/UL — SIGNIFICANT CHANGE UP (ref 0–0.9)
MONOCYTES NFR BLD AUTO: 6.6 % — SIGNIFICANT CHANGE UP (ref 2–14)
NEUTROPHILS # BLD AUTO: 5.52 K/UL — SIGNIFICANT CHANGE UP (ref 1.8–7.4)
NEUTROPHILS NFR BLD AUTO: 64.8 % — SIGNIFICANT CHANGE UP (ref 43–77)
NT-PROBNP SERPL-SCNC: 23 PG/ML — SIGNIFICANT CHANGE UP (ref 0–125)
PLATELET # BLD AUTO: 212 K/UL — SIGNIFICANT CHANGE UP (ref 150–400)
POTASSIUM SERPL-MCNC: 3.9 MMOL/L — SIGNIFICANT CHANGE UP (ref 3.5–5.3)
POTASSIUM SERPL-SCNC: 3.9 MMOL/L — SIGNIFICANT CHANGE UP (ref 3.5–5.3)
PROT SERPL-MCNC: 6.8 GM/DL — SIGNIFICANT CHANGE UP (ref 6–8.3)
RBC # BLD: 4.64 M/UL — SIGNIFICANT CHANGE UP (ref 4.2–5.8)
RBC # FLD: 14.2 % — SIGNIFICANT CHANGE UP (ref 10.3–14.5)
RSV RNA NPH QL NAA+NON-PROBE: SIGNIFICANT CHANGE UP
SARS-COV-2 RNA SPEC QL NAA+PROBE: SIGNIFICANT CHANGE UP
SODIUM SERPL-SCNC: 141 MMOL/L — SIGNIFICANT CHANGE UP (ref 135–145)
TROPONIN I, HIGH SENSITIVITY RESULT: 6.51 NG/L — SIGNIFICANT CHANGE UP
WBC # BLD: 8.51 K/UL — SIGNIFICANT CHANGE UP (ref 3.8–10.5)
WBC # FLD AUTO: 8.51 K/UL — SIGNIFICANT CHANGE UP (ref 3.8–10.5)

## 2023-02-11 PROCEDURE — 70450 CT HEAD/BRAIN W/O DYE: CPT | Mod: 26,MD

## 2023-02-11 PROCEDURE — 99285 EMERGENCY DEPT VISIT HI MDM: CPT | Mod: 25

## 2023-02-11 PROCEDURE — 71045 X-RAY EXAM CHEST 1 VIEW: CPT | Mod: 26

## 2023-02-11 PROCEDURE — 83880 ASSAY OF NATRIURETIC PEPTIDE: CPT

## 2023-02-11 PROCEDURE — 0241U: CPT

## 2023-02-11 PROCEDURE — 93010 ELECTROCARDIOGRAM REPORT: CPT

## 2023-02-11 PROCEDURE — 84484 ASSAY OF TROPONIN QUANT: CPT

## 2023-02-11 PROCEDURE — 99285 EMERGENCY DEPT VISIT HI MDM: CPT

## 2023-02-11 PROCEDURE — 80053 COMPREHEN METABOLIC PANEL: CPT

## 2023-02-11 PROCEDURE — 93005 ELECTROCARDIOGRAM TRACING: CPT

## 2023-02-11 PROCEDURE — 83735 ASSAY OF MAGNESIUM: CPT

## 2023-02-11 PROCEDURE — 36415 COLL VENOUS BLD VENIPUNCTURE: CPT

## 2023-02-11 PROCEDURE — 85025 COMPLETE CBC W/AUTO DIFF WBC: CPT

## 2023-02-11 PROCEDURE — 71045 X-RAY EXAM CHEST 1 VIEW: CPT

## 2023-02-11 PROCEDURE — 70450 CT HEAD/BRAIN W/O DYE: CPT | Mod: MD

## 2023-02-11 RX ORDER — SODIUM CHLORIDE 9 MG/ML
500 INJECTION INTRAMUSCULAR; INTRAVENOUS; SUBCUTANEOUS ONCE
Refills: 0 | Status: COMPLETED | OUTPATIENT
Start: 2023-02-11 | End: 2023-02-11

## 2023-02-11 RX ADMIN — SODIUM CHLORIDE 500 MILLILITER(S): 9 INJECTION INTRAMUSCULAR; INTRAVENOUS; SUBCUTANEOUS at 22:56

## 2023-02-11 SDOH — ECONOMIC STABILITY - HOUSING INSECURITY: HOMELESSNESS UNSPECIFIED: Z59.00

## 2023-02-11 NOTE — ED PROVIDER NOTE - CARE PROVIDER_API CALL
Dewayne Hall (MD)  Cardiovascular Disease  [address], [address]  [place] [address]  Phone: [phone]  Fax: [phone]  Follow Up Time:

## 2023-02-11 NOTE — ED PROVIDER NOTE - CARE PLAN
1 Principal Discharge DX:	Lightheadedness   Principal Discharge DX:	Lightheadedness  Goal:	near syncope, homelessness

## 2023-02-11 NOTE — ED PROVIDER NOTE - OBJECTIVE STATEMENT
26 yo male wPMHx of asthma presents to the ED BIBEMS c/o vomiting. As per EMS pt was found outside c/o vomiting, chest discomfort feels like he has mucus in his chest and wheezing. Pt reports that he was on the street and fell a couple of times. +LOC. Pt is homeless. 27 years old male w/ PMHx of asthma presents to the ED BIBEMS c/o vomiting and nausea. As per EMS pt was found outside c/o vomiting, complaints of cp, and near syncope. patient is homeless and states he does not have sufficient social support. Admits to Marijuana use, occasional ETOH use, denies recent drug use. no recent trauma. No neck pain or stiffness.

## 2023-02-11 NOTE — ED PROVIDER NOTE - CLINICAL SUMMARY MEDICAL DECISION MAKING FREE TEXT BOX
Patient homeless, presents with feeling lightheaded, and possible syncope also complaining of pain. After one set of troponin I, patient states he wishes to leave, does not wish for social support, nor is he amicable for further inpatient care including second troponin or other imaging. AAOx4, full capacity and judgment, understands that while his risk factor is low for possible ACS, that can not guarantee against it without further blood work. Wishes to leave, Strict return precautions provided.

## 2023-02-11 NOTE — ED PROVIDER NOTE - DIFFERENTIAL DIAGNOSIS
Differential Diagnosis Patient homeless, presents with feeling lightheaded, and possible syncope also complaining of pain. After one set of troponin I, patient states he wishes to leave, does not wish for social support, nor is he amicable for further inpatient care including second troponin or other imaging. AAOx4, full capacity and judgment, understands that while his risk factor is low for possible ACS, that can not guarantee against it without further blood work. Wishes to leave, Strict return precautions provided.

## 2023-02-11 NOTE — ED PROVIDER NOTE - PATIENT PORTAL LINK FT
You can access the FollowMyHealth Patient Portal offered by [facility] by registering at the following website: [email or web address]. By joining [employer]’s FollowMyHealth portal, you will also be able to view your health information using other applications (apps) compatible with our system.

## 2023-02-11 NOTE — ED PROVIDER NOTE - NS_ ATTENDINGSCRIBEDETAILS _ED_A_ED_FT
I Mil Dial MD saw and examined the patient. Scribe documented for me and under my supervision. I have modified the scribe's documentation where necessary to reflect my history, physical exam and other relevant documentations pertinent to the care of the patient.
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
## 2023-02-11 NOTE — ED PROVIDER NOTE - PROGRESS NOTE DETAILS
Jayro LOPES: Patient did not want to stay in the emergency room any longer to get second trop, outpatient follow up and strict return precautions provided. Patient declines CT angio or second trop. Currently has no chest pain. Offered him  and he declines wishes to go leave. Jayro LOPES: Patient did not want to stay in the emergency room any longer to get second trop, outpatient follow up and strict return precautions provided. Patient declines CT angio or second trop. Currently has no chest pain. Offered him  and he declines wishes to go leave. Patient left without getting the discharge paper work as per RN.

## 2023-02-11 NOTE — ED PROVIDER NOTE - NSFOLLOWUPINSTRUCTIONS_ED_ALL_ED_FT
Please note that I have discussed with you the results of your labs and imaging.  Please note that on evaluation of your blood work there is no acute pathology in your kidney and liver.  Please note that you have had the symptoms for more than a day and that your heart enzyme called troponin I is negative.  Please note that 1 negative set of troponin I does not inclusively rule out the possibility of a cardiac disease.  However given that you had the symptoms more than a day and 1 troponin is negative it is okay for you to go home so long as you follow-up with a cardiologist.  Please return to us immediately if you have any chest pain shortness of breath lightheadedness dizziness or fainting.  Please note that if you have any abdominal pain lower back pain upper back pain or if any swelling you should return to us immediately.    ____________      Nonspecific Chest Pain, Adult      Chest pain is an uncomfortable, tight, or painful feeling in the chest. The pain can feel like a crushing, aching, or squeezing pressure. A person can feel a burning or tingling sensation. Chest pain can also be felt in your back, neck, jaw, shoulder, or arm. This pain can be worse when you move, sneeze, or take a deep breath.    Chest pain can be caused by a condition that is life-threatening. This must be treated right away. It can also be caused by something that is not life-threatening. If you have chest pain, it can be hard to know the difference, so it is important to get help right away to make sure that you do not have a serious condition.    Some life-threatening causes of chest pain include:  •Heart attack.      •A tear in the body's main blood vessel (aortic dissection).      •Inflammation around your heart (pericarditis).      •A problem in the lungs, such as a blood clot (pulmonary embolism) or a collapsed lung (pneumothorax).      Some non life-threatening causes of chest pain include:  •Heartburn.      •Anxiety or stress.      •Damage to the bones, muscles, and cartilage that make up your chest wall.      •Pneumonia or bronchitis.      •Shingles infection (varicella-zoster virus).      Your chest pain may come and go. It may also be constant. Your health care provider will do tests and other studies to find the cause of your pain. Treatment will depend on the cause of your chest pain.      Follow these instructions at home:    Medicines     •Take over-the-counter and prescription medicines only as told by your health care provider.      •If you were prescribed an antibiotic medicine, take it as told by your health care provider. Do not stop taking the antibiotic even if you start to feel better.      Activity     •Avoid any activities that cause chest pain.      • Do not lift anything that is heavier than 10 lb (4.5 kg), or the limit that you are told, until your health care provider says that it is safe.      •Rest as directed by your health care provider.       •Return to your normal activities only as told by your health care provider. Ask your health care provider what activities are safe for you.        Lifestyle       A plate along with examples of foods in a healthy diet.       Silhouette of a person sitting on the floor doing yoga.     • Do not use any products that contain nicotine or tobacco, such as cigarettes, e-cigarettes, and chewing tobacco. If you need help quitting, ask your health care provider.      • Do not drink alcohol.    •Make healthy lifestyle changes as recommended. These may include:  •Getting regular exercise. Ask your health care provider to suggest some exercises that are safe for you.      •Eating a heart-healthy diet. This includes plenty of fresh fruits and vegetables, whole grains, low-fat (lean) protein, and low-fat dairy products. A dietitian can help you find healthy eating options.      •Maintaining a healthy weight.      •Managing any other health conditions you may have, such as high blood pressure (hypertension) or diabetes.      •Reducing stress, such as with yoga or relaxation techniques.        General instructions     •Pay attention to any changes in your symptoms.      •It is up to you to get the results of any tests that were done. Ask your health care provider, or the department that is doing the tests, when your results will be ready.      •Keep all follow-up visits as told by your health care provider. This is important.       •You may be asked to go for further testing if your chest pain does not go away.        Contact a health care provider if:    •Your chest pain does not go away.      •You feel depressed.      •You have a fever.      •You notice changes in your symptoms or develop new symptoms.        Get help right away if:    •Your chest pain gets worse.      •You have a cough that gets worse, or you cough up blood.      •You have severe pain in your abdomen.      •You faint.      •You have sudden, unexplained chest discomfort.      •You have sudden, unexplained discomfort in your arms, back, neck, or jaw.      •You have shortness of breath at any time.      •You suddenly start to sweat, or your skin gets clammy.      •You feel nausea or you vomit.      •You suddenly feel lightheaded or dizzy.      •You have severe weakness, or unexplained weakness or fatigue.      •Your heart begins to beat quickly, or it feels like it is skipping beats.      These symptoms may represent a serious problem that is an emergency. Do not wait to see if the symptoms will go away. Get medical help right away. Call your local emergency services (911 in the [place] Do not drive yourself to the hospital.       Summary    •Chest pain can be caused by a condition that is serious and requires urgent treatment. It may also be caused by something that is not life-threatening.      •Your health care provider may do lab tests and other studies to find the cause of your pain.      •Follow your health care provider's instructions on taking medicines, making lifestyle changes, and getting emergency treatment if symptoms become worse.      •Keep all follow-up visits as told by your health care provider. This includes visits for any further testing if your chest pain does not go away.      This information is not intended to replace advice given to you by your health care provider. Make sure you discuss any questions you have with your health care provider.

## 2023-02-12 VITALS
TEMPERATURE: 98 F | DIASTOLIC BLOOD PRESSURE: 64 MMHG | SYSTOLIC BLOOD PRESSURE: 118 MMHG | OXYGEN SATURATION: 98 % | HEART RATE: 72 BPM | RESPIRATION RATE: 18 BRPM

## 2023-02-13 ENCOUNTER — EMERGENCY (EMERGENCY)
Facility: HOSPITAL | Age: 28
LOS: 0 days | Discharge: HOME | End: 2023-02-13
Attending: HOSPITALIST
Payer: MEDICAID

## 2023-02-13 VITALS
TEMPERATURE: 98 F | SYSTOLIC BLOOD PRESSURE: 110 MMHG | OXYGEN SATURATION: 99 % | HEART RATE: 80 BPM | DIASTOLIC BLOOD PRESSURE: 61 MMHG | RESPIRATION RATE: 18 BRPM

## 2023-02-13 VITALS
RESPIRATION RATE: 16 BRPM | OXYGEN SATURATION: 96 % | HEIGHT: 73 IN | TEMPERATURE: 98 F | HEART RATE: 92 BPM | WEIGHT: 199.96 LBS | DIASTOLIC BLOOD PRESSURE: 80 MMHG | SYSTOLIC BLOOD PRESSURE: 127 MMHG

## 2023-02-13 DIAGNOSIS — J34.89 OTHER SPECIFIED DISORDERS OF NOSE AND NASAL SINUSES: ICD-10-CM
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
## 2023-02-13 DIAGNOSIS — R51.9 HEADACHE, UNSPECIFIED: ICD-10-CM

## 2023-02-13 DIAGNOSIS — Z59.00 HOMELESSNESS UNSPECIFIED: ICD-10-CM
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
## 2023-02-13 PROCEDURE — 76376 3D RENDER W/INTRP POSTPROCES: CPT

## 2023-02-13 PROCEDURE — 70486 CT MAXILLOFACIAL W/O DYE: CPT | Mod: MA

## 2023-02-13 PROCEDURE — 99284 EMERGENCY DEPT VISIT MOD MDM: CPT

## 2023-02-13 PROCEDURE — 70486 CT MAXILLOFACIAL W/O DYE: CPT | Mod: 26,MA

## 2023-02-13 PROCEDURE — 99284 EMERGENCY DEPT VISIT MOD MDM: CPT | Mod: 25

## 2023-02-13 PROCEDURE — 76376 3D RENDER W/INTRP POSTPROCES: CPT | Mod: 26

## 2023-02-13 RX ORDER — ACETAMINOPHEN 500 MG
650 TABLET ORAL ONCE
Refills: 0 | Status: COMPLETED | OUTPATIENT
Start: 2023-02-13 | End: 2023-02-13

## 2023-02-13 RX ORDER — BENZTROPINE MESYLATE 1 MG
0.5 TABLET ORAL THREE TIMES A DAY
Refills: 0 | Status: DISCONTINUED | OUTPATIENT
Start: 2023-02-13 | End: 2023-02-13

## 2023-02-13 RX ORDER — CHOLECALCIFEROL (VITAMIN D3) 125 MCG
2000 CAPSULE ORAL DAILY
Refills: 0 | Status: DISCONTINUED | OUTPATIENT
Start: 2023-02-13 | End: 2023-02-13

## 2023-02-13 RX ORDER — VALPROIC ACID (AS SODIUM SALT) 250 MG/5ML
750 SOLUTION, ORAL ORAL ONCE
Refills: 0 | Status: COMPLETED | OUTPATIENT
Start: 2023-02-13 | End: 2023-02-13

## 2023-02-13 RX ORDER — HALOPERIDOL DECANOATE 100 MG/ML
10 INJECTION INTRAMUSCULAR
Refills: 0 | Status: DISCONTINUED | OUTPATIENT
Start: 2023-02-13 | End: 2023-02-13

## 2023-02-13 RX ADMIN — Medication 650 MILLIGRAM(S): at 01:46

## 2023-02-13 RX ADMIN — HALOPERIDOL DECANOATE 10 MILLIGRAM(S): 100 INJECTION INTRAMUSCULAR at 11:45

## 2023-02-13 RX ADMIN — Medication 0.5 MILLIGRAM(S): at 11:45

## 2023-02-13 RX ADMIN — Medication 750 MILLIGRAM(S): at 11:45

## 2023-02-13 RX ADMIN — Medication 2000 UNIT(S): at 11:45

## 2023-02-13 SDOH — ECONOMIC STABILITY - HOUSING INSECURITY: HOMELESSNESS UNSPECIFIED: Z59.00

## 2023-02-13 NOTE — ED PROVIDER NOTE - NSFOLLOWUPINSTRUCTIONS_ED_ALL_ED_FT
Take tylenol as needed for pain, 650Mg every 6-8 hours.  You can also take ibuprofen as needed for pain, 600mg every 6-8 hours, take with food. Take tylenol as needed for pain, 650Mg every 6-8 hours.  You can also take ibuprofen as needed for pain, 600mg every 6-8 hours, take with food.    Please call and follow up with your doctor in 1-3 days.    Return to the Emergency Department for worsening or persistent symptoms, and/or ANY NEW OR CONCERNING SYMPTOMS. If you have issues obtaining follow up, please call: [phone] or [phone]  to obtain a doctor or specialist who takes your insurance in your area.

## 2023-02-13 NOTE — ED PROVIDER NOTE - NS ED ROS FT
Constitutional: No fever or chills  Eyes: No visual changes  HEENT: No throat pain. + pain in nose.  CV: No chest pain  Resp: No SOB no cough  GI: No abd pain, nausea or vomiting  : No dysuria  MSK: No musculoskeletal pain  Skin: No rash  Neuro: No headache
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
## 2023-02-13 NOTE — ED ADULT NURSE NOTE - NSIMPLEMENTINTERV_GEN_ALL_ED
Implemented All Fall Risk Interventions:  [place] to call system. Call bell, personal items and telephone within reach. Instruct patient to call for assistance. Room bathroom lighting operational. Non-slip footwear when patient is off stretcher. Physically safe environment: no spills, clutter or unnecessary equipment. Stretcher in lowest position, wheels locked, appropriate side rails in place. Provide visual cue, wrist band, yellow gown, etc. Monitor gait and stability. Monitor for mental status changes and reorient to person, place, and time. Review medications for side effects contributing to fall risk. Reinforce activity limits and safety measures with patient and family.

## 2023-02-13 NOTE — ED PROVIDER NOTE - PATIENT PORTAL LINK FT
You can access the FollowMyHealth Patient Portal offered by [facility] by registering at the following website: [email or web address]. By joining [employer]’s FollowMyHealth portal, you will also be able to view your health information using other applications (apps) compatible with our system.
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
## 2023-02-13 NOTE — CHART NOTE - NSCHARTNOTEFT_GEN_A_CORE
Pt is a 27yr old male with no significant PMHx and a past psychiatric hx pf Schizophrenia/SAD, PTSD, and Cannabis abuse. Pt recently transferred to  from [place] on 1/25/23 for continued Behavioral Health evaluation and management of paranoia/ schizophrenia. Pt discharged on Friday 2/10/23, after declining to wait [facility] shelter housing which had been initiated on 2/8/23.   TAI spoke with Zaida at [facility] [phone]. Per Zaida, pt is placed at : [address] [place] [address].  Unit clerk Garza arranged for Medicaid taxi, 12:30pm pickup, via [employer], Confirm# [id number]. TAI informed pt, MD and RN of same. All parties in agreement with safe discharge plan.

## 2023-02-13 NOTE — ED ADULT NURSE REASSESSMENT NOTE - NS ED NURSE REASSESS COMMENT FT1
pt fed and provided warm blankets. safety and comfort maintained. medicated as per orders. no current complaints of discomfort. A&Ox4

## 2023-02-13 NOTE — ED PROVIDER NOTE - PHYSICAL EXAMINATION
***GEN - NAD; well appearing; A+O x3 ***HEAD - NC/AT ***EYES/NOSE - no hyphema, no nasal septal hematoma, PERRL, EOMI, mucous membranes moist, no discharge ***THROAT: no loose teeth, normal alignment of bite, Oral cavity and pharynx normal. No inflammation, swelling, exudate, or lesions.  ***NECK: Neck supple  ***PULMONARY - CTA b/l, symmetric breath sounds. ***CARDIAC -s1s2, RRR, no M,G,R  ***ABDOMEN - +BS, ND, NT, soft, no guarding, no rebound, no masses   ***BACK - no CVA tenderness, Normal  spine ***EXTREMITIES - symmetric pulses, 2+ dp, capillary refill < 2 seconds ***SKIN - no rash or bruising   ***NEUROLOGIC - alert, CN 2-12 intact
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
## 2023-04-13 NOTE — ED ADULT NURSE NOTE - ALCOHOL PRE SCREEN (AUDIT - C)
No abnormal movements No abnormal movements No abnormal movements No abnormal movements No abnormal movements Statement Selected No abnormal movements No abnormal movements
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
## 2023-08-24 NOTE — ED ADULT NURSE NOTE - NSHOSCREENINGQ1_ED_ALL_ED
Assessment/Plan:  Deformity of foot.  Diabetic neuropathy.  Pain upon ambulation.  Pain upon ambulation.  Mycosis of nail.  Callus formation.     Diabetic foot ulcer with secondary cellulitis, resolved. Left foot deformity.     Plan. Chart reviewed. Lab work reviewed. Kyrie Moreno debrided.   all mycotic nails debrided without pain or complication.  Foot exam performed.  Patient educated on care of the diabetic foot.  Plantar calluses debrided as well.  Patient remain on gabapentin as well.  Refill ordered.     Right foot ulcer debrided.  Utilizing 10 blade excisional debridement done of left foot diabetic foot ulcer.  All devitalized tissue debrided.  Bleeding base was 100% of the wound upon completion.  Gentian violet and dry sterile dressing applied. [facility] on [facility]care[facility]            Subjective:   patient is diabetic.  He has pain upon ambulation.  He has pain with shoe wear.  No history of trauma             Past Medical History:   Diagnosis Date   • Acquired hallux valgus       last assessed: 8/1/2013   • Allergic rhinitis     • Anemia 10/26/2010   • Atrophy of nail       last assessed: 7/7/2015   • Chronic kidney disease       chronic renal insufficiency   • Coronary artery disease     • Deformity of ankle and foot, acquired       last assessed: 2/22/2016   • Diabetes mellitus ([address])     • Difficulty walking       last assessed: 12/14/2015   • Dysesthesia       last assessed: 11/25/2016   • Hammer toe       unspecified laterality; last assessed: 8/1/2013   • Hypertension     • Onychomycosis of toenail       last assessed: 2/22/2016   • Peripheral vascular disease ([address])       left SFA stent in 2010   • Pes planus       unspecified laterality; last assessed: 8/1/2013   • Pneumonia       last assessed: 2/27/2016   • 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       last assessed: 8/15/2016   • Type 2 diabetes mellitus ([address]) 07/26/2010   • Viral warts       last assessed: 7/24/2015                 Past Surgical History:   Procedure Laterality Date   • CARDIAC CATHETERIZATION   07/29/2010   • CATARACT EXTRACTION, BILATERAL Bilateral       R 1999, L 2008   • FEMORAL ARTERY - POPLITEAL ARTERY BYPASS GRAFT   09/23/2013   • FOOT SURGERY         bone spur removed   • LEG SURGERY Bilateral       repair; L 8/20/2010 and 7/26/2012   • ROTATOR CUFF REPAIR Left 2009   • SHOULDER SURGERY Right 2005     collar bone         No Known Allergies        Current Outpatient Medications:   •  acarbose (PRECOSE) 100 MG tablet, Take 1 tablet (100 mg total) by mouth 3 (three) times a day with meals, Disp: 270 tablet, Rfl: 1  •  amLODIPine (NORVASC) 2.5 mg tablet, Take 1 tablet (2.5 mg total) by mouth daily (Patient not taking: Reported on 9/26/2019), Disp: 90 tablet, Rfl: 3  •  aspirin (ECOTRIN LOW STRENGTH) 81 mg EC tablet, Take 1 tablet (81 mg total) by mouth daily, Disp: 30 tablet, Rfl: 6  •  b complex-vitamin C-folic acid (NEPHROCAPS) 1 mg capsule, Take 1 capsule by mouth daily, Disp: , Rfl:   •  betamethasone dipropionate (DIPROSONE) 0.05 % cream, Use as dir twice daily, Disp: , Rfl:   •  carvedilol (COREG) 12.5 mg tablet, TAKE 1 TABLET TWICE DAILY, Disp: 180 tablet, Rfl: 1  •  gabapentin (NEURONTIN) 600 MG tablet, Take 1 tablet (600 mg total) by mouth 2 (two) times a day, Disp: 180 tablet, Rfl: 0  •  glimepiride (AMARYL) 4 mg tablet, Take 1 tablet (4 mg total) by mouth 2 (two) times a day for 126 days, Disp: 180 tablet, Rfl: 0  •  glucose blood (TRUETRACK TEST) test strip, 1 each by Other route daily Use as instructed for E11.29, Disp: 100 each, Rfl: 3  •  lisinopril (ZESTRIL) 5 mg tablet, Take 1 tablet (5 mg total) by mouth daily (Patient taking differently: Take 2.5 mg by mouth daily ), Disp: 90 tablet, Rfl: 3  •  metFORMIN (GLUMETZA) 500 MG (MOD) 24 hr tablet, Take 2 tablets (1,000 mg total) by mouth 2 (two) times a day with meals, Disp: 120 tablet, Rfl: 5  •  multivitamin (THERAGRAN) TABS, Take 1 tablet by mouth daily, Disp: , Rfl:   •  omeprazole (PriLOSEC) 40 MG capsule, Take 1 capsule (40 mg total) by mouth daily, Disp: 90 capsule, Rfl: 1  •  simvastatin (ZOCOR) 40 mg tablet, TAKE 1 TABLET (40 MG TOTAL) BY MOUTH DAILY, Disp: 90 tablet, Rfl: 1  •  TRUEPLUS LANCETS 28G MISC, Test 1x/d, E11.29, Disp: , Rfl: 0           Patient Active Problem List   Diagnosis   • Diabetic polyneuropathy associated with type 2 diabetes mellitus (HCC)   • Allergic rhinitis   • Arthropathy   • PAD (peripheral artery disease) ([facility])   • Benign essential hypertension   • CAD (coronary artery disease)   • CKD stage 2 due to type 2 diabetes mellitus ([address] )   • Diabetic neuropathy (HCC)   • GE reflux   • Lumbar radiculopathy   • Rosacea   • Seborrheic dermatitis   • Type 2 diabetes mellitus with diabetic neuropathy ([facility])   • Onychomycosis   • Occlusion of femoral-popliteal bypass graft (HCC)   • Prostate cancer screening   • Dyslipidemia   • Screening for AAA (aortic abdominal aneurysm)   • Medicare annual wellness visit, subsequent   • Type 2 diabetes mellitus with stage 3 chronic kidney disease, without long-term current use of insulin ([facility])   • Pain in both feet   • Corns   • Plantar warts             Patient ID: Pernell Covarrubias is a 79 y. o. male.        The following portions of the patient's history were reviewed and updated as appropriate:      family history includes Aortic aneurysm in his mother; Heart attack in his father and sister; Heart disease in his father, paternal grandfather, and sister.       reports that he has never smoked. He has never used smokeless tobacco. He reports that he drinks alcohol. He reports that he does not use drugs.        Objective:  Patient's shoes and socks removed.   Foot ExamPhysical Exam          Physical Exam  Left Foot: Appearance: a deformity (ball of foot and distal fifth metatarsal ). Fifth toe deformities include hammer toe. Tenderness: None except the plantar aspect of the foot.    Right Foot: Appearance: a deformity (distal fifth metatarsal ). Left Ankle: ROM: limited ROM in all planes   Right Ankle: ROM: limited ROM in all planes   Neurological Exam: Light touch was decreased bilaterally. Vibratory sensation was decreased in both first metatarsophalangeal joints. Response to monofilament test was absent bilaterally. Deep tendon reflexes: achilles reflex 1/4 bilaterally. Vascular Exam: performed Dorsalis pedis pulses were 1/4 bilaterally. Posterior tibial pulses were 1/4 bilaterally. Elevation Pallor: diminished bilaterally. Capillary refill time was Q. 9, findings bilateral. Negative digital hair noted, positive abnormal cooling. All pre-ulcer, lesions debrided. Today, but between 1-3 seconds bilaterally. Edema: mild bilaterally and All mycotic nails debrided. Today. The patient was given orthotics to help control his feet and at as cushioning and padding on the bottom of his feet q9 findings. Toenails: All of the toenails were elongated, hypertrophied, discolored, ingrown, shown to have subungual debris and tender.      Socks and shoes removed, the Right Foot: the foot was dry. The sensory exam showed diminished vibratory sensation at the level of the toes. Diminished tactile sensation with monofilament testing throughout the right foot.   Socks and shoes removed, Left Foot: the foot was dry. The sensory exam showed diminished vibratory sensation at the level of the toes. Diminished tactile sensation with monofilament testing throughout the left foot.   Pulses:   1+ in the posterior tibialis on the right   1+ in the dorsalis pedis on the right. Capillary refills findings on the left were delayed in the toes. Pulses:   1+ in the posterior tibialis on the left   1+ in the dorsalis pedis on the left. Assign Risk Category: 2: Loss of protective sensation with or without weakness, deformity, callus, pre-ulcer, or history of ulceration. High risk.    Hyperkeratosis: present on both first sub metatarsals, present on both fifth sub metatarsals and Pre-ulcerative lesion, submetatarsal one to 5, bilateral.   Shoe Gear Evaluation: performed (). Recommendation(s): custom inlays.      Patient's shoes and socks removed. Right Foot/Ankle   Right Foot Inspection  Skin Exam: callus and callus               Toe Exam: swelling and erythema  Sensory   Vibration: diminished  Proprioception: diminished      Vascular  Capillary refills: elevated        Left Foot/Ankle  Left Foot Inspection  Skin Exam: callus.  Submetatarsal 5 of the left foot demonstrates 0.5 cm grade Gamino grade 0/preulcerative callus.  Cellulitis resolved.              Toe Exam: swelling and erythema                   Sensory   Vibration: diminished  Proprioception: diminished     Vascular  Capillary refills: elevated.     Patient's shoes and socks removed.           Assign Risk Category  Deformity present  Loss of protective sensation  Weak pulses  Risk: 2        No

## 2024-09-12 NOTE — PROGRESS NOTE BEHAVIORAL HEALTH - OTHER
Goal Outcome Evaluation:  Plan of Care Reviewed With: patient        Progress: no change  Outcome Evaluation: pt has slept on and off throughout the night, no new complaints at this time, pt turned throughout the night, pt remains on 3l nc, continues to complain of SOB-NP notified, no new complaints at this time, will d/c home when appropriate                               
indifferent
No gross disturbances noted on this morning's assessment
indifferent

## 2025-01-19 NOTE — H&P ADULT - ALLERGIC/IMMUNOLOGIC
[FreeTextEntry1] : 24 year old  with known diagnosis of System Lupus Erythematosus for last 2 years presents to the clinic as a referral for protein in the urine.\par \par Proteinuria\par - Patient with proteinuria in the past with concern for Lupus affecting the kidneys\par - Patient noted with 0.4 g of proteinuria in May however did have some blood in urine as well however that was during time of menstrual cycle\par - Patient to repeat blood work today including renal panel, complements, urinalysis, urine protein/creatinine ratio\par - Discussed possibility of needing biopsy based on the lab results which we will discuss with patient\par \par Dispo: Patient has SLE and lab studies show disease is quiescent in the kidneys. There is no blood, 0.2g of proteinuria and stable renal function with normal complements. dsDNA is positive and patient to follow with Dr. Veronica in a week. Patient does not need to return to renal clinic for now and can follow with rheumatology as indicated and can return to renal clinic with any issues or concerns of Lupus affecting the kidneys. No indication for renal biopsy as of now.\par \par RTC as PRN\par \par Case seen and discussed with Dr. Chacon who agrees with assessment and plan as above. negative No

## 2025-03-09 NOTE — BH INPATIENT PSYCHIATRY PROGRESS NOTE - NSBHMSEKNOWHOW_PSY_ALL_CORE
Current Events
Current Events
Attending Attestation (For Attendings USE Only)...
Current Events

## 2025-04-03 NOTE — PROGRESS NOTE BEH[facility]IORAL HEALTH - NSBHCHARTREVIEWIMAGING_PSY_A_CORE FT
Mercedez Brooks calling reporting a positive pregnancy test.    Mercedez Brooks is not a new patient.     This is  Mercedez Brooks's first pregnancy.        Mercedez Brooks last menstrual cycle: 3/11  Based on LMP patient is 3w2d weeks     Dose patient have any concerns?   [x]YES worried about spotting HCG PENDING  []NO  If yes, please discuss concern with provider to determine if patient needs additional appointment.      Scheduling Instructions and Education  [x]If patient less than 8 weeks please schedule missed menses (30 mins) between 6-8 weeks. ALSO scheduled USN w/ IPV (w/ NURSE) to follow 2-3 weeks after missed menses   Patient education: Initial missed menses appointment will consist of a pregnancy test and to establish care and review previous births **if applicable**. There will NOT be an ultrasound at this first visit. Please review that the USN and IPV visit will be 2-3 weeks after patient's missed menses. At this appointment dating ultrasound will be complete, prenatal labs ordered, prenatal education completed, pelvic   Please document appointments scheduled during phone call   Missed menses date/provider: 5/5 3:00 YVONNE  USN/IPV date:USN 5/14 2:30, IPV 2:45    Please forward telephone encounter to provider appointments are scheduled with prior to closing telephone encounter.    
MEDICATIONS  (STANDING):  risperiDONE  Disintegrating Tablet 3 milliGRAM(s) Oral at bedtime  risperiDONE  Disintegrating Tablet 0.5 milliGRAM(s) Oral daily    MEDICATIONS  (PRN):  acetaminophen   Tablet 650 milliGRAM(s) Oral every 6 hours PRN For Temp greater than 38 C (100.4 F)  aluminum hydroxide/magnesium hydroxide/simethicone Suspension 30 milliLiter(s) Oral every 6 hours PRN Dyspepsia  diphenhydrAMINE   Capsule 50 milliGRAM(s) Oral every 4 hours PRN Rash and/or Itching  docusate sodium 100 milliGRAM(s) Oral daily PRN Constipation  haloperidol     Tablet 5 milliGRAM(s) Oral every 6 hours PRN agitation  haloperidol    Injectable 5 milliGRAM(s) IntraMuscular every 6 hours PRN Agitation  ibuprofen  Tablet 600 milliGRAM(s) Oral every 6 hours PRN pain  LORazepam     Tablet 2 milliGRAM(s) Oral every 6 hours PRN Anxiety  LORazepam   Injectable 2 milliGRAM(s) IntraMuscular every 6 hours PRN Agitation  LORazepam   Injectable 2 milliGRAM(s) IntraMuscular every 6 hours PRN Anxiety  magnesium hydroxide Suspension 30 milliLiter(s) Oral daily PRN Constipation  simethicone 80 milliGRAM(s) Chew four times a day PRN Gas
MEDICATIONS  (STANDING):  risperiDONE  Disintegrating Tablet 0.5 milliGRAM(s) Oral at bedtime    MEDICATIONS  (PRN):  acetaminophen   Tablet 650 milliGRAM(s) Oral every 6 hours PRN For Temp greater than 38 C (100.4 F)  aluminum hydroxide/magnesium hydroxide/simethicone Suspension 30 milliLiter(s) Oral every 6 hours PRN Dyspepsia  diphenhydrAMINE   Capsule 50 milliGRAM(s) Oral every 4 hours PRN Rash and/or Itching  docusate sodium 100 milliGRAM(s) Oral daily PRN Constipation  haloperidol     Tablet 5 milliGRAM(s) Oral every 6 hours PRN agitation  haloperidol    Injectable 5 milliGRAM(s) IntraMuscular every 6 hours PRN Agitation  ibuprofen  Tablet 600 milliGRAM(s) Oral every 6 hours PRN pain  LORazepam     Tablet 2 milliGRAM(s) Oral every 6 hours PRN Anxiety  LORazepam   Injectable 2 milliGRAM(s) IntraMuscular every 6 hours PRN Anxiety  magnesium hydroxide Suspension 30 milliLiter(s) Oral daily PRN Constipation  simethicone 80 milliGRAM(s) Chew four times a day PRN Gas
MEDICATIONS  (STANDING):  docusate sodium 100 milliGRAM(s) Oral two times a day  magnesium hydroxide Suspension 30 milliLiter(s) Oral daily  polyethylene glycol 3350 17 Gram(s) Oral two times a day  risperiDONE  Disintegrating Tablet 4 milliGRAM(s) Oral at bedtime  risperiDONE  Disintegrating Tablet 2 milliGRAM(s) Oral daily  senna 2 Tablet(s) Oral at bedtime    MEDICATIONS  (PRN):  acetaminophen   Tablet 650 milliGRAM(s) Oral every 6 hours PRN For Temp greater than 38 C (100.4 F)  aluminum hydroxide/magnesium hydroxide/simethicone Suspension 30 milliLiter(s) Oral every 6 hours PRN Dyspepsia  haloperidol     Tablet 5 milliGRAM(s) Oral every 6 hours PRN agitation  haloperidol    Injectable 5 milliGRAM(s) IntraMuscular every 6 hours PRN Agitation  hydrOXYzine hydrochloride 25 milliGRAM(s) Oral every 6 hours PRN Anxiety  ibuprofen  Tablet 600 milliGRAM(s) Oral every 6 hours PRN pain  nicotine  Polacrilex Gum 2 milliGRAM(s) Oral every 3 hours PRN nicotine craving  simethicone 80 milliGRAM(s) Chew four times a day PRN Gas
MEDICATIONS  (STANDING):  docusate sodium 100 milliGRAM(s) Oral two times a day  magnesium hydroxide Suspension 30 milliLiter(s) Oral daily  risperiDONE  Disintegrating Tablet 3 milliGRAM(s) Oral at bedtime  risperiDONE  Disintegrating Tablet 0.5 milliGRAM(s) Oral daily    MEDICATIONS  (PRN):  acetaminophen   Tablet 650 milliGRAM(s) Oral every 6 hours PRN For Temp greater than 38 C (100.4 F)  aluminum hydroxide/magnesium hydroxide/simethicone Suspension 30 milliLiter(s) Oral every 6 hours PRN Dyspepsia  diphenhydrAMINE   Capsule 50 milliGRAM(s) Oral every 4 hours PRN Rash and/or Itching  haloperidol     Tablet 5 milliGRAM(s) Oral every 6 hours PRN agitation  haloperidol    Injectable 5 milliGRAM(s) IntraMuscular every 6 hours PRN Agitation  ibuprofen  Tablet 600 milliGRAM(s) Oral every 6 hours PRN pain  LORazepam     Tablet 2 milliGRAM(s) Oral every 6 hours PRN Anxiety  LORazepam   Injectable 2 milliGRAM(s) IntraMuscular every 6 hours PRN Agitation  nicotine  Polacrilex Gum 2 milliGRAM(s) Oral every 3 hours PRN nicotine craving  simethicone 80 milliGRAM(s) Chew four times a day PRN Gas
MEDICATIONS  (STANDING):  docusate sodium 100 milliGRAM(s) Oral two times a day  magnesium hydroxide Suspension 30 milliLiter(s) Oral daily  risperiDONE  Disintegrating Tablet 4 milliGRAM(s) Oral at bedtime  risperiDONE  Disintegrating Tablet 0.5 milliGRAM(s) Oral daily  senna 2 Tablet(s) Oral at bedtime    MEDICATIONS  (PRN):  acetaminophen   Tablet 650 milliGRAM(s) Oral every 6 hours PRN For Temp greater than 38 C (100.4 F)  aluminum hydroxide/magnesium hydroxide/simethicone Suspension 30 milliLiter(s) Oral every 6 hours PRN Dyspepsia  diphenhydrAMINE   Capsule 50 milliGRAM(s) Oral every 4 hours PRN Rash and/or Itching  haloperidol     Tablet 5 milliGRAM(s) Oral every 6 hours PRN agitation  haloperidol    Injectable 5 milliGRAM(s) IntraMuscular every 6 hours PRN Agitation  ibuprofen  Tablet 600 milliGRAM(s) Oral every 6 hours PRN pain  LORazepam     Tablet 2 milliGRAM(s) Oral every 6 hours PRN Anxiety  nicotine  Polacrilex Gum 2 milliGRAM(s) Oral every 3 hours PRN nicotine craving  simethicone 80 milliGRAM(s) Chew four times a day PRN Gas
MEDICATIONS  (STANDING):  risperiDONE  Disintegrating Tablet 0.5 milliGRAM(s) Oral at bedtime    MEDICATIONS  (PRN):  acetaminophen   Tablet 650 milliGRAM(s) Oral every 6 hours PRN For Temp greater than 38 C (100.4 F)  aluminum hydroxide/magnesium hydroxide/simethicone Suspension 30 milliLiter(s) Oral every 6 hours PRN Dyspepsia  diphenhydrAMINE   Capsule 50 milliGRAM(s) Oral every 4 hours PRN Rash and/or Itching  docusate sodium 100 milliGRAM(s) Oral daily PRN Constipation  haloperidol     Tablet 5 milliGRAM(s) Oral every 6 hours PRN agitation  haloperidol    Injectable 5 milliGRAM(s) IntraMuscular every 6 hours PRN Agitation  ibuprofen  Tablet 600 milliGRAM(s) Oral every 6 hours PRN pain  LORazepam     Tablet 2 milliGRAM(s) Oral every 6 hours PRN Anxiety  LORazepam   Injectable 2 milliGRAM(s) IntraMuscular every 6 hours PRN Anxiety  magnesium hydroxide Suspension 30 milliLiter(s) Oral daily PRN Constipation  simethicone 80 milliGRAM(s) Chew four times a day PRN Gas
MEDICATIONS  (STANDING):  docusate sodium 100 milliGRAM(s) Oral two times a day  magnesium hydroxide Suspension 30 milliLiter(s) Oral daily  polyethylene glycol 3350 17 Gram(s) Oral two times a day  risperiDONE  Disintegrating Tablet 4 milliGRAM(s) Oral at bedtime  risperiDONE  Disintegrating Tablet 2 milliGRAM(s) Oral daily  senna 2 Tablet(s) Oral at bedtime    MEDICATIONS  (PRN):  acetaminophen   Tablet 650 milliGRAM(s) Oral every 6 hours PRN For Temp greater than 38 C (100.4 F)  aluminum hydroxide/magnesium hydroxide/simethicone Suspension 30 milliLiter(s) Oral every 6 hours PRN Dyspepsia  haloperidol     Tablet 5 milliGRAM(s) Oral every 6 hours PRN agitation  haloperidol    Injectable 5 milliGRAM(s) IntraMuscular every 6 hours PRN Agitation  hydrOXYzine hydrochloride 25 milliGRAM(s) Oral every 6 hours PRN Anxiety  ibuprofen  Tablet 600 milliGRAM(s) Oral every 6 hours PRN pain  nicotine  Polacrilex Gum 2 milliGRAM(s) Oral every 3 hours PRN nicotine craving  simethicone 80 milliGRAM(s) Chew four times a day PRN Gas
MEDICATIONS  (STANDING):  docusate sodium 100 milliGRAM(s) Oral two times a day  magnesium hydroxide Suspension 30 milliLiter(s) Oral daily  polyethylene glycol 3350 17 Gram(s) Oral two times a day  risperiDONE  Disintegrating Tablet 4 milliGRAM(s) Oral at bedtime  risperiDONE  Disintegrating Tablet 2 milliGRAM(s) Oral daily  senna 2 Tablet(s) Oral at bedtime    MEDICATIONS  (PRN):  acetaminophen   Tablet 650 milliGRAM(s) Oral every 6 hours PRN For Temp greater than 38 C (100.4 F)  aluminum hydroxide/magnesium hydroxide/simethicone Suspension 30 milliLiter(s) Oral every 6 hours PRN Dyspepsia  haloperidol     Tablet 5 milliGRAM(s) Oral every 6 hours PRN agitation  haloperidol    Injectable 5 milliGRAM(s) IntraMuscular every 6 hours PRN Agitation  hydrOXYzine hydrochloride 25 milliGRAM(s) Oral every 6 hours PRN Anxiety  ibuprofen  Tablet 600 milliGRAM(s) Oral every 6 hours PRN pain  nicotine  Polacrilex Gum 2 milliGRAM(s) Oral every 3 hours PRN nicotine craving  simethicone 80 milliGRAM(s) Chew four times a day PRN Gas
MEDICATIONS  (STANDING):  docusate sodium 100 milliGRAM(s) Oral two times a day  magnesium hydroxide Suspension 30 milliLiter(s) Oral daily  risperiDONE  Disintegrating Tablet 4 milliGRAM(s) Oral at bedtime  risperiDONE  Disintegrating Tablet 0.5 milliGRAM(s) Oral daily  senna 2 Tablet(s) Oral at bedtime    MEDICATIONS  (PRN):  acetaminophen   Tablet 650 milliGRAM(s) Oral every 6 hours PRN For Temp greater than 38 C (100.4 F)  aluminum hydroxide/magnesium hydroxide/simethicone Suspension 30 milliLiter(s) Oral every 6 hours PRN Dyspepsia  diphenhydrAMINE   Capsule 50 milliGRAM(s) Oral every 4 hours PRN Rash and/or Itching  haloperidol     Tablet 5 milliGRAM(s) Oral every 6 hours PRN agitation  haloperidol    Injectable 5 milliGRAM(s) IntraMuscular every 6 hours PRN Agitation  ibuprofen  Tablet 600 milliGRAM(s) Oral every 6 hours PRN pain  LORazepam     Tablet 2 milliGRAM(s) Oral every 6 hours PRN Anxiety  nicotine  Polacrilex Gum 2 milliGRAM(s) Oral every 3 hours PRN nicotine craving  simethicone 80 milliGRAM(s) Chew four times a day PRN Gas
MEDICATIONS  (STANDING):  docusate sodium 100 milliGRAM(s) Oral two times a day  magnesium hydroxide Suspension 30 milliLiter(s) Oral daily  risperiDONE  Disintegrating Tablet 4 milliGRAM(s) Oral at bedtime  risperiDONE  Disintegrating Tablet 1 milliGRAM(s) Oral daily  senna 2 Tablet(s) Oral at bedtime    MEDICATIONS  (PRN):  acetaminophen   Tablet 650 milliGRAM(s) Oral every 6 hours PRN For Temp greater than 38 C (100.4 F)  aluminum hydroxide/magnesium hydroxide/simethicone Suspension 30 milliLiter(s) Oral every 6 hours PRN Dyspepsia  haloperidol     Tablet 5 milliGRAM(s) Oral every 6 hours PRN agitation  haloperidol    Injectable 5 milliGRAM(s) IntraMuscular every 6 hours PRN Agitation  hydrOXYzine hydrochloride 25 milliGRAM(s) Oral every 6 hours PRN Anxiety  ibuprofen  Tablet 600 milliGRAM(s) Oral every 6 hours PRN pain  nicotine  Polacrilex Gum 2 milliGRAM(s) Oral every 3 hours PRN nicotine craving  simethicone 80 milliGRAM(s) Chew four times a day PRN Gas
MEDICATIONS  (STANDING):  docusate sodium 100 milliGRAM(s) Oral two times a day  magnesium hydroxide Suspension 30 milliLiter(s) Oral daily  risperiDONE  Disintegrating Tablet 4 milliGRAM(s) Oral at bedtime  risperiDONE  Disintegrating Tablet 2 milliGRAM(s) Oral daily  senna 2 Tablet(s) Oral at bedtime    MEDICATIONS  (PRN):  acetaminophen   Tablet 650 milliGRAM(s) Oral every 6 hours PRN For Temp greater than 38 C (100.4 F)  aluminum hydroxide/magnesium hydroxide/simethicone Suspension 30 milliLiter(s) Oral every 6 hours PRN Dyspepsia  haloperidol     Tablet 5 milliGRAM(s) Oral every 6 hours PRN agitation  haloperidol    Injectable 5 milliGRAM(s) IntraMuscular every 6 hours PRN Agitation  hydrOXYzine hydrochloride 25 milliGRAM(s) Oral every 6 hours PRN Anxiety  ibuprofen  Tablet 600 milliGRAM(s) Oral every 6 hours PRN pain  nicotine  Polacrilex Gum 2 milliGRAM(s) Oral every 3 hours PRN nicotine craving  simethicone 80 milliGRAM(s) Chew four times a day PRN Gas
MEDICATIONS  (STANDING):  risperiDONE  Disintegrating Tablet 0.5 milliGRAM(s) Oral daily  risperiDONE  Disintegrating Tablet 2 milliGRAM(s) Oral at bedtime    MEDICATIONS  (PRN):  acetaminophen   Tablet 650 milliGRAM(s) Oral every 6 hours PRN For Temp greater than 38 C (100.4 F)  aluminum hydroxide/magnesium hydroxide/simethicone Suspension 30 milliLiter(s) Oral every 6 hours PRN Dyspepsia  diphenhydrAMINE   Capsule 50 milliGRAM(s) Oral every 4 hours PRN Rash and/or Itching  docusate sodium 100 milliGRAM(s) Oral daily PRN Constipation  haloperidol     Tablet 5 milliGRAM(s) Oral every 6 hours PRN agitation  haloperidol    Injectable 5 milliGRAM(s) IntraMuscular every 6 hours PRN Agitation  ibuprofen  Tablet 600 milliGRAM(s) Oral every 6 hours PRN pain  LORazepam     Tablet 2 milliGRAM(s) Oral every 6 hours PRN Anxiety  LORazepam   Injectable 2 milliGRAM(s) IntraMuscular every 6 hours PRN Anxiety  magnesium hydroxide Suspension 30 milliLiter(s) Oral daily PRN Constipation  simethicone 80 milliGRAM(s) Chew four times a day PRN Gas
MEDICATIONS  (STANDING):  risperiDONE  Disintegrating Tablet 3 milliGRAM(s) Oral at bedtime  risperiDONE  Disintegrating Tablet 0.5 milliGRAM(s) Oral daily    MEDICATIONS  (PRN):  acetaminophen   Tablet 650 milliGRAM(s) Oral every 6 hours PRN For Temp greater than 38 C (100.4 F)  aluminum hydroxide/magnesium hydroxide/simethicone Suspension 30 milliLiter(s) Oral every 6 hours PRN Dyspepsia  diphenhydrAMINE   Capsule 50 milliGRAM(s) Oral every 4 hours PRN Rash and/or Itching  docusate sodium 100 milliGRAM(s) Oral daily PRN Constipation  haloperidol     Tablet 5 milliGRAM(s) Oral every 6 hours PRN agitation  haloperidol    Injectable 5 milliGRAM(s) IntraMuscular every 6 hours PRN Agitation  ibuprofen  Tablet 600 milliGRAM(s) Oral every 6 hours PRN pain  LORazepam     Tablet 2 milliGRAM(s) Oral every 6 hours PRN Anxiety  LORazepam   Injectable 2 milliGRAM(s) IntraMuscular every 6 hours PRN Agitation  LORazepam   Injectable 2 milliGRAM(s) IntraMuscular every 6 hours PRN Anxiety  magnesium hydroxide Suspension 30 milliLiter(s) Oral daily PRN Constipation  simethicone 80 milliGRAM(s) Chew four times a day PRN Gas
MEDICATIONS  (STANDING):  docusate sodium 100 milliGRAM(s) Oral two times a day  magnesium hydroxide Suspension 30 milliLiter(s) Oral daily  risperiDONE  Disintegrating Tablet 4 milliGRAM(s) Oral at bedtime  risperiDONE  Disintegrating Tablet 2 milliGRAM(s) Oral daily  senna 2 Tablet(s) Oral at bedtime    MEDICATIONS  (PRN):  acetaminophen   Tablet 650 milliGRAM(s) Oral every 6 hours PRN For Temp greater than 38 C (100.4 F)  aluminum hydroxide/magnesium hydroxide/simethicone Suspension 30 milliLiter(s) Oral every 6 hours PRN Dyspepsia  haloperidol     Tablet 5 milliGRAM(s) Oral every 6 hours PRN agitation  haloperidol    Injectable 5 milliGRAM(s) IntraMuscular every 6 hours PRN Agitation  hydrOXYzine hydrochloride 25 milliGRAM(s) Oral every 6 hours PRN Anxiety  ibuprofen  Tablet 600 milliGRAM(s) Oral every 6 hours PRN pain  nicotine  Polacrilex Gum 2 milliGRAM(s) Oral every 3 hours PRN nicotine craving  simethicone 80 milliGRAM(s) Chew four times a day PRN Gas
MEDICATIONS  (STANDING):  docusate sodium 100 milliGRAM(s) Oral two times a day  magnesium hydroxide Suspension 30 milliLiter(s) Oral daily  risperiDONE  Disintegrating Tablet 4 milliGRAM(s) Oral at bedtime  risperiDONE  Disintegrating Tablet 1 milliGRAM(s) Oral daily  senna 2 Tablet(s) Oral at bedtime    MEDICATIONS  (PRN):  acetaminophen   Tablet 650 milliGRAM(s) Oral every 6 hours PRN For Temp greater than 38 C (100.4 F)  aluminum hydroxide/magnesium hydroxide/simethicone Suspension 30 milliLiter(s) Oral every 6 hours PRN Dyspepsia  haloperidol     Tablet 5 milliGRAM(s) Oral every 6 hours PRN agitation  haloperidol    Injectable 5 milliGRAM(s) IntraMuscular every 6 hours PRN Agitation  hydrOXYzine hydrochloride 25 milliGRAM(s) Oral every 6 hours PRN Anxiety  ibuprofen  Tablet 600 milliGRAM(s) Oral every 6 hours PRN pain  nicotine  Polacrilex Gum 2 milliGRAM(s) Oral every 3 hours PRN nicotine craving  simethicone 80 milliGRAM(s) Chew four times a day PRN Gas
MEDICATIONS  (STANDING):  docusate sodium 100 milliGRAM(s) Oral two times a day  magnesium hydroxide Suspension 30 milliLiter(s) Oral daily  risperiDONE  Disintegrating Tablet 4 milliGRAM(s) Oral at bedtime  risperiDONE  Disintegrating Tablet 2 milliGRAM(s) Oral daily  senna 2 Tablet(s) Oral at bedtime    MEDICATIONS  (PRN):  acetaminophen   Tablet 650 milliGRAM(s) Oral every 6 hours PRN For Temp greater than 38 C (100.4 F)  aluminum hydroxide/magnesium hydroxide/simethicone Suspension 30 milliLiter(s) Oral every 6 hours PRN Dyspepsia  haloperidol     Tablet 5 milliGRAM(s) Oral every 6 hours PRN agitation  haloperidol    Injectable 5 milliGRAM(s) IntraMuscular every 6 hours PRN Agitation  hydrOXYzine hydrochloride 25 milliGRAM(s) Oral every 6 hours PRN Anxiety  ibuprofen  Tablet 600 milliGRAM(s) Oral every 6 hours PRN pain  nicotine  Polacrilex Gum 2 milliGRAM(s) Oral every 3 hours PRN nicotine craving  simethicone 80 milliGRAM(s) Chew four times a day PRN Gas
MEDICATIONS  (STANDING):  risperiDONE  Disintegrating Tablet 0.5 milliGRAM(s) Oral daily  risperiDONE  Disintegrating Tablet 2 milliGRAM(s) Oral at bedtime    MEDICATIONS  (PRN):  acetaminophen   Tablet 650 milliGRAM(s) Oral every 6 hours PRN For Temp greater than 38 C (100.4 F)  aluminum hydroxide/magnesium hydroxide/simethicone Suspension 30 milliLiter(s) Oral every 6 hours PRN Dyspepsia  diphenhydrAMINE   Capsule 50 milliGRAM(s) Oral every 4 hours PRN Rash and/or Itching  docusate sodium 100 milliGRAM(s) Oral daily PRN Constipation  haloperidol     Tablet 5 milliGRAM(s) Oral every 6 hours PRN agitation  haloperidol    Injectable 5 milliGRAM(s) IntraMuscular every 6 hours PRN Agitation  ibuprofen  Tablet 600 milliGRAM(s) Oral every 6 hours PRN pain  LORazepam     Tablet 2 milliGRAM(s) Oral every 6 hours PRN Anxiety  LORazepam   Injectable 2 milliGRAM(s) IntraMuscular every 6 hours PRN Anxiety  magnesium hydroxide Suspension 30 milliLiter(s) Oral daily PRN Constipation  simethicone 80 milliGRAM(s) Chew four times a day PRN Gas
MEDICATIONS  (STANDING):  risperiDONE  Disintegrating Tablet 0.5 milliGRAM(s) Oral at bedtime    MEDICATIONS  (PRN):  acetaminophen   Tablet 650 milliGRAM(s) Oral every 6 hours PRN For Temp greater than 38 C (100.4 F)  aluminum hydroxide/magnesium hydroxide/simethicone Suspension 30 milliLiter(s) Oral every 6 hours PRN Dyspepsia  diphenhydrAMINE   Capsule 50 milliGRAM(s) Oral every 4 hours PRN Rash and/or Itching  docusate sodium 100 milliGRAM(s) Oral daily PRN Constipation  haloperidol     Tablet 5 milliGRAM(s) Oral every 6 hours PRN agitation  haloperidol    Injectable 5 milliGRAM(s) IntraMuscular every 6 hours PRN Agitation  ibuprofen  Tablet 600 milliGRAM(s) Oral every 6 hours PRN pain  LORazepam     Tablet 2 milliGRAM(s) Oral every 6 hours PRN Anxiety  LORazepam   Injectable 2 milliGRAM(s) IntraMuscular every 6 hours PRN Anxiety  magnesium hydroxide Suspension 30 milliLiter(s) Oral daily PRN Constipation  simethicone 80 milliGRAM(s) Chew four times a day PRN Gas
MEDICATIONS  (STANDING):  risperiDONE  Disintegrating Tablet 3 milliGRAM(s) Oral at bedtime  risperiDONE  Disintegrating Tablet 0.5 milliGRAM(s) Oral daily    MEDICATIONS  (PRN):  acetaminophen   Tablet 650 milliGRAM(s) Oral every 6 hours PRN For Temp greater than 38 C (100.4 F)  aluminum hydroxide/magnesium hydroxide/simethicone Suspension 30 milliLiter(s) Oral every 6 hours PRN Dyspepsia  diphenhydrAMINE   Capsule 50 milliGRAM(s) Oral every 4 hours PRN Rash and/or Itching  docusate sodium 100 milliGRAM(s) Oral daily PRN Constipation  haloperidol     Tablet 5 milliGRAM(s) Oral every 6 hours PRN agitation  haloperidol    Injectable 5 milliGRAM(s) IntraMuscular every 6 hours PRN Agitation  ibuprofen  Tablet 600 milliGRAM(s) Oral every 6 hours PRN pain  LORazepam     Tablet 2 milliGRAM(s) Oral every 6 hours PRN Anxiety  LORazepam   Injectable 2 milliGRAM(s) IntraMuscular every 6 hours PRN Agitation  LORazepam   Injectable 2 milliGRAM(s) IntraMuscular every 6 hours PRN Anxiety  magnesium hydroxide Suspension 30 milliLiter(s) Oral daily PRN Constipation  simethicone 80 milliGRAM(s) Chew four times a day PRN Gas
MEDICATIONS  (STANDING):  docusate sodium 100 milliGRAM(s) Oral two times a day  magnesium hydroxide Suspension 30 milliLiter(s) Oral daily  risperiDONE  Disintegrating Tablet 3 milliGRAM(s) Oral at bedtime  risperiDONE  Disintegrating Tablet 0.5 milliGRAM(s) Oral daily    MEDICATIONS  (PRN):  acetaminophen   Tablet 650 milliGRAM(s) Oral every 6 hours PRN For Temp greater than 38 C (100.4 F)  aluminum hydroxide/magnesium hydroxide/simethicone Suspension 30 milliLiter(s) Oral every 6 hours PRN Dyspepsia  diphenhydrAMINE   Capsule 50 milliGRAM(s) Oral every 4 hours PRN Rash and/or Itching  haloperidol     Tablet 5 milliGRAM(s) Oral every 6 hours PRN agitation  haloperidol    Injectable 5 milliGRAM(s) IntraMuscular every 6 hours PRN Agitation  ibuprofen  Tablet 600 milliGRAM(s) Oral every 6 hours PRN pain  LORazepam     Tablet 2 milliGRAM(s) Oral every 6 hours PRN Anxiety  LORazepam   Injectable 2 milliGRAM(s) IntraMuscular every 6 hours PRN Agitation  nicotine  Polacrilex Gum 2 milliGRAM(s) Oral every 3 hours PRN nicotine craving  simethicone 80 milliGRAM(s) Chew four times a day PRN Gas
MEDICATIONS  (STANDING):  docusate sodium 100 milliGRAM(s) Oral two times a day  magnesium hydroxide Suspension 30 milliLiter(s) Oral daily  polyethylene glycol 3350 17 Gram(s) Oral two times a day  risperiDONE  Disintegrating Tablet 4 milliGRAM(s) Oral at bedtime  risperiDONE  Disintegrating Tablet 2 milliGRAM(s) Oral daily  senna 2 Tablet(s) Oral at bedtime    MEDICATIONS  (PRN):  acetaminophen   Tablet 650 milliGRAM(s) Oral every 6 hours PRN For Temp greater than 38 C (100.4 F)  aluminum hydroxide/magnesium hydroxide/simethicone Suspension 30 milliLiter(s) Oral every 6 hours PRN Dyspepsia  haloperidol     Tablet 5 milliGRAM(s) Oral every 6 hours PRN agitation  haloperidol    Injectable 5 milliGRAM(s) IntraMuscular every 6 hours PRN Agitation  hydrOXYzine hydrochloride 25 milliGRAM(s) Oral every 6 hours PRN Anxiety  ibuprofen  Tablet 600 milliGRAM(s) Oral every 6 hours PRN pain  nicotine  Polacrilex Gum 2 milliGRAM(s) Oral every 3 hours PRN nicotine craving  simethicone 80 milliGRAM(s) Chew four times a day PRN Gas
MEDICATIONS  (STANDING):  docusate sodium 100 milliGRAM(s) Oral two times a day  magnesium hydroxide Suspension 30 milliLiter(s) Oral daily  risperiDONE  Disintegrating Tablet 4 milliGRAM(s) Oral at bedtime  risperiDONE  Disintegrating Tablet 1 milliGRAM(s) Oral daily  senna 2 Tablet(s) Oral at bedtime    MEDICATIONS  (PRN):  acetaminophen   Tablet 650 milliGRAM(s) Oral every 6 hours PRN For Temp greater than 38 C (100.4 F)  aluminum hydroxide/magnesium hydroxide/simethicone Suspension 30 milliLiter(s) Oral every 6 hours PRN Dyspepsia  haloperidol     Tablet 5 milliGRAM(s) Oral every 6 hours PRN agitation  haloperidol    Injectable 5 milliGRAM(s) IntraMuscular every 6 hours PRN Agitation  hydrOXYzine hydrochloride 25 milliGRAM(s) Oral every 6 hours PRN Anxiety  ibuprofen  Tablet 600 milliGRAM(s) Oral every 6 hours PRN pain  nicotine  Polacrilex Gum 2 milliGRAM(s) Oral every 3 hours PRN nicotine craving  simethicone 80 milliGRAM(s) Chew four times a day PRN Gas
MEDICATIONS  (STANDING):  docusate sodium 100 milliGRAM(s) Oral two times a day  magnesium hydroxide Suspension 30 milliLiter(s) Oral daily  risperiDONE  Disintegrating Tablet 3 milliGRAM(s) Oral at bedtime  risperiDONE  Disintegrating Tablet 0.5 milliGRAM(s) Oral daily    MEDICATIONS  (PRN):  acetaminophen   Tablet 650 milliGRAM(s) Oral every 6 hours PRN For Temp greater than 38 C (100.4 F)  aluminum hydroxide/magnesium hydroxide/simethicone Suspension 30 milliLiter(s) Oral every 6 hours PRN Dyspepsia  diphenhydrAMINE   Capsule 50 milliGRAM(s) Oral every 4 hours PRN Rash and/or Itching  haloperidol     Tablet 5 milliGRAM(s) Oral every 6 hours PRN agitation  haloperidol    Injectable 5 milliGRAM(s) IntraMuscular every 6 hours PRN Agitation  ibuprofen  Tablet 600 milliGRAM(s) Oral every 6 hours PRN pain  LORazepam     Tablet 2 milliGRAM(s) Oral every 6 hours PRN Anxiety  LORazepam   Injectable 2 milliGRAM(s) IntraMuscular every 6 hours PRN Agitation  nicotine  Polacrilex Gum 2 milliGRAM(s) Oral every 3 hours PRN nicotine craving  simethicone 80 milliGRAM(s) Chew four times a day PRN Gas
MEDICATIONS  (STANDING):  docusate sodium 100 milliGRAM(s) Oral two times a day  magnesium hydroxide Suspension 30 milliLiter(s) Oral daily  risperiDONE  Disintegrating Tablet 4 milliGRAM(s) Oral at bedtime  risperiDONE  Disintegrating Tablet 2 milliGRAM(s) Oral daily  senna 2 Tablet(s) Oral at bedtime    MEDICATIONS  (PRN):  acetaminophen   Tablet 650 milliGRAM(s) Oral every 6 hours PRN For Temp greater than 38 C (100.4 F)  aluminum hydroxide/magnesium hydroxide/simethicone Suspension 30 milliLiter(s) Oral every 6 hours PRN Dyspepsia  haloperidol     Tablet 5 milliGRAM(s) Oral every 6 hours PRN agitation  haloperidol    Injectable 5 milliGRAM(s) IntraMuscular every 6 hours PRN Agitation  hydrOXYzine hydrochloride 25 milliGRAM(s) Oral every 6 hours PRN Anxiety  ibuprofen  Tablet 600 milliGRAM(s) Oral every 6 hours PRN pain  nicotine  Polacrilex Gum 2 milliGRAM(s) Oral every 3 hours PRN nicotine craving  simethicone 80 milliGRAM(s) Chew four times a day PRN Gas
MEDICATIONS  (STANDING):  docusate sodium 100 milliGRAM(s) Oral two times a day  magnesium hydroxide Suspension 30 milliLiter(s) Oral daily  risperiDONE  Disintegrating Tablet 4 milliGRAM(s) Oral at bedtime  risperiDONE  Disintegrating Tablet 0.5 milliGRAM(s) Oral daily  senna 2 Tablet(s) Oral at bedtime    MEDICATIONS  (PRN):  acetaminophen   Tablet 650 milliGRAM(s) Oral every 6 hours PRN For Temp greater than 38 C (100.4 F)  aluminum hydroxide/magnesium hydroxide/simethicone Suspension 30 milliLiter(s) Oral every 6 hours PRN Dyspepsia  diphenhydrAMINE   Capsule 50 milliGRAM(s) Oral every 4 hours PRN Rash and/or Itching  haloperidol     Tablet 5 milliGRAM(s) Oral every 6 hours PRN agitation  haloperidol    Injectable 5 milliGRAM(s) IntraMuscular every 6 hours PRN Agitation  ibuprofen  Tablet 600 milliGRAM(s) Oral every 6 hours PRN pain  LORazepam     Tablet 2 milliGRAM(s) Oral every 6 hours PRN Anxiety  nicotine  Polacrilex Gum 2 milliGRAM(s) Oral every 3 hours PRN nicotine craving  simethicone 80 milliGRAM(s) Chew four times a day PRN Gas
MEDICATIONS  (STANDING):  risperiDONE  Disintegrating Tablet 0.5 milliGRAM(s) Oral at bedtime    MEDICATIONS  (PRN):  acetaminophen   Tablet 650 milliGRAM(s) Oral every 6 hours PRN For Temp greater than 38 C (100.4 F)  aluminum hydroxide/magnesium hydroxide/simethicone Suspension 30 milliLiter(s) Oral every 6 hours PRN Dyspepsia  diphenhydrAMINE   Capsule 50 milliGRAM(s) Oral every 4 hours PRN Rash and/or Itching  docusate sodium 100 milliGRAM(s) Oral daily PRN Constipation  haloperidol     Tablet 5 milliGRAM(s) Oral every 6 hours PRN agitation  haloperidol    Injectable 5 milliGRAM(s) IntraMuscular every 6 hours PRN Agitation  ibuprofen  Tablet 600 milliGRAM(s) Oral every 6 hours PRN pain  LORazepam     Tablet 2 milliGRAM(s) Oral every 6 hours PRN Anxiety  LORazepam   Injectable 2 milliGRAM(s) IntraMuscular every 6 hours PRN Anxiety  magnesium hydroxide Suspension 30 milliLiter(s) Oral daily PRN Constipation  simethicone 80 milliGRAM(s) Chew four times a day PRN Gas
MEDICATIONS  (STANDING):  docusate sodium 100 milliGRAM(s) Oral two times a day  magnesium hydroxide Suspension 30 milliLiter(s) Oral daily  risperiDONE  Disintegrating Tablet 4 milliGRAM(s) Oral at bedtime  risperiDONE  Disintegrating Tablet 2 milliGRAM(s) Oral daily  senna 2 Tablet(s) Oral at bedtime    MEDICATIONS  (PRN):  acetaminophen   Tablet 650 milliGRAM(s) Oral every 6 hours PRN For Temp greater than 38 C (100.4 F)  aluminum hydroxide/magnesium hydroxide/simethicone Suspension 30 milliLiter(s) Oral every 6 hours PRN Dyspepsia  haloperidol     Tablet 5 milliGRAM(s) Oral every 6 hours PRN agitation  haloperidol    Injectable 5 milliGRAM(s) IntraMuscular every 6 hours PRN Agitation  hydrOXYzine hydrochloride 25 milliGRAM(s) Oral every 6 hours PRN Anxiety  ibuprofen  Tablet 600 milliGRAM(s) Oral every 6 hours PRN pain  nicotine  Polacrilex Gum 2 milliGRAM(s) Oral every 3 hours PRN nicotine craving  simethicone 80 milliGRAM(s) Chew four times a day PRN Gas
MEDICATIONS  (STANDING):  docusate sodium 100 milliGRAM(s) Oral two times a day  magnesium hydroxide Suspension 30 milliLiter(s) Oral daily  risperiDONE  Disintegrating Tablet 4 milliGRAM(s) Oral at bedtime  risperiDONE  Disintegrating Tablet 1 milliGRAM(s) Oral daily  senna 2 Tablet(s) Oral at bedtime    MEDICATIONS  (PRN):  acetaminophen   Tablet 650 milliGRAM(s) Oral every 6 hours PRN For Temp greater than 38 C (100.4 F)  aluminum hydroxide/magnesium hydroxide/simethicone Suspension 30 milliLiter(s) Oral every 6 hours PRN Dyspepsia  haloperidol     Tablet 5 milliGRAM(s) Oral every 6 hours PRN agitation  haloperidol    Injectable 5 milliGRAM(s) IntraMuscular every 6 hours PRN Agitation  hydrOXYzine hydrochloride 25 milliGRAM(s) Oral every 6 hours PRN Anxiety  ibuprofen  Tablet 600 milliGRAM(s) Oral every 6 hours PRN pain  nicotine  Polacrilex Gum 2 milliGRAM(s) Oral every 3 hours PRN nicotine craving  simethicone 80 milliGRAM(s) Chew four times a day PRN Gas
MEDICATIONS  (STANDING):  docusate sodium 100 milliGRAM(s) Oral two times a day  magnesium hydroxide Suspension 30 milliLiter(s) Oral daily  risperiDONE  Disintegrating Tablet 4 milliGRAM(s) Oral at bedtime  risperiDONE  Disintegrating Tablet 2 milliGRAM(s) Oral daily  senna 2 Tablet(s) Oral at bedtime    MEDICATIONS  (PRN):  acetaminophen   Tablet 650 milliGRAM(s) Oral every 6 hours PRN For Temp greater than 38 C (100.4 F)  aluminum hydroxide/magnesium hydroxide/simethicone Suspension 30 milliLiter(s) Oral every 6 hours PRN Dyspepsia  haloperidol     Tablet 5 milliGRAM(s) Oral every 6 hours PRN agitation  haloperidol    Injectable 5 milliGRAM(s) IntraMuscular every 6 hours PRN Agitation  hydrOXYzine hydrochloride 25 milliGRAM(s) Oral every 6 hours PRN Anxiety  ibuprofen  Tablet 600 milliGRAM(s) Oral every 6 hours PRN pain  nicotine  Polacrilex Gum 2 milliGRAM(s) Oral every 3 hours PRN nicotine craving  simethicone 80 milliGRAM(s) Chew four times a day PRN Gas

## 2025-04-25 NOTE — BH PATIENT PROFILE - NSDASAPHYSSTAFF_PSY_ALL_CORE
[de-identified] : Mom states pt coughing and congested since yesterday. Mom states pt was up at night every 20 minutes. No fever. Mom heard pt was whizzing not in distress  [FreeTextEntry6] : + congestion and cough X2days, + barky cough;+ stridor but resolved now; no ST, no ear pain, no n/v/c/d, eating and drinking well, normal voiding. afebrile. no previous nebulizer treatments,no COVID or flu exposure No
